# Patient Record
Sex: FEMALE | Race: WHITE | Employment: OTHER | URBAN - NONMETROPOLITAN AREA
[De-identification: names, ages, dates, MRNs, and addresses within clinical notes are randomized per-mention and may not be internally consistent; named-entity substitution may affect disease eponyms.]

---

## 2017-01-04 ENCOUNTER — OFFICE VISIT (OUTPATIENT)
Dept: INTERNAL MEDICINE | Age: 82
End: 2017-01-04

## 2017-01-04 VITALS
HEIGHT: 61 IN | BODY MASS INDEX: 17.64 KG/M2 | DIASTOLIC BLOOD PRESSURE: 68 MMHG | HEART RATE: 76 BPM | RESPIRATION RATE: 16 BRPM | WEIGHT: 93.4 LBS | SYSTOLIC BLOOD PRESSURE: 120 MMHG

## 2017-01-04 DIAGNOSIS — E78.2 MIXED HYPERLIPIDEMIA: ICD-10-CM

## 2017-01-04 DIAGNOSIS — F41.9 ANXIETY: ICD-10-CM

## 2017-01-04 DIAGNOSIS — E03.8 OTHER SPECIFIED HYPOTHYROIDISM: ICD-10-CM

## 2017-01-04 DIAGNOSIS — M15.9 PRIMARY OSTEOARTHRITIS INVOLVING MULTIPLE JOINTS: ICD-10-CM

## 2017-01-04 DIAGNOSIS — K21.9 GASTROESOPHAGEAL REFLUX DISEASE WITHOUT ESOPHAGITIS: ICD-10-CM

## 2017-01-04 DIAGNOSIS — Z72.0 TOBACCO ABUSE: ICD-10-CM

## 2017-01-04 DIAGNOSIS — R73.01 ELEVATED FASTING GLUCOSE: Primary | ICD-10-CM

## 2017-01-04 DIAGNOSIS — I25.10 CORONARY ARTERY DISEASE INVOLVING NATIVE CORONARY ARTERY OF NATIVE HEART, ANGINA PRESENCE UNSPECIFIED: ICD-10-CM

## 2017-01-04 DIAGNOSIS — J43.8 OTHER EMPHYSEMA (HCC): ICD-10-CM

## 2017-01-04 PROCEDURE — 99213 OFFICE O/P EST LOW 20 MIN: CPT | Performed by: INTERNAL MEDICINE

## 2017-01-04 RX ORDER — LORATADINE 10 MG/1
10 TABLET ORAL DAILY
COMMUNITY
End: 2018-07-26 | Stop reason: ALTCHOICE

## 2017-01-13 RX ORDER — LORAZEPAM 1 MG/1
1 TABLET ORAL EVERY 6 HOURS PRN
Qty: 120 TABLET | Refills: 0 | OUTPATIENT
Start: 2017-01-13 | End: 2017-02-13 | Stop reason: SDUPTHER

## 2017-02-13 RX ORDER — LORAZEPAM 1 MG/1
1 TABLET ORAL EVERY 6 HOURS PRN
Qty: 120 TABLET | Refills: 0 | Status: SHIPPED | OUTPATIENT
Start: 2017-02-13 | End: 2017-03-16 | Stop reason: SDUPTHER

## 2017-03-16 RX ORDER — LORAZEPAM 1 MG/1
1 TABLET ORAL EVERY 6 HOURS PRN
Qty: 120 TABLET | Refills: 0 | OUTPATIENT
Start: 2017-03-16 | End: 2017-05-12 | Stop reason: SDUPTHER

## 2017-04-07 ENCOUNTER — OFFICE VISIT (OUTPATIENT)
Dept: OPHTHALMOLOGY | Age: 82
End: 2017-04-07
Payer: MEDICARE

## 2017-04-07 ENCOUNTER — OFFICE VISIT (OUTPATIENT)
Dept: INTERNAL MEDICINE | Age: 82
End: 2017-04-07
Payer: MEDICARE

## 2017-04-07 VITALS
DIASTOLIC BLOOD PRESSURE: 68 MMHG | WEIGHT: 93.4 LBS | BODY MASS INDEX: 17.64 KG/M2 | SYSTOLIC BLOOD PRESSURE: 120 MMHG | HEART RATE: 78 BPM | HEIGHT: 61 IN | RESPIRATION RATE: 16 BRPM

## 2017-04-07 DIAGNOSIS — H02.839 DERMATOCHALASIS, UNSPECIFIED LATERALITY: ICD-10-CM

## 2017-04-07 DIAGNOSIS — J43.8 OTHER EMPHYSEMA (HCC): ICD-10-CM

## 2017-04-07 DIAGNOSIS — R73.01 ELEVATED FASTING GLUCOSE: ICD-10-CM

## 2017-04-07 DIAGNOSIS — M15.9 PRIMARY OSTEOARTHRITIS INVOLVING MULTIPLE JOINTS: ICD-10-CM

## 2017-04-07 DIAGNOSIS — Z96.1 PSEUDOPHAKIA OF BOTH EYES: ICD-10-CM

## 2017-04-07 DIAGNOSIS — E03.8 OTHER SPECIFIED HYPOTHYROIDISM: ICD-10-CM

## 2017-04-07 DIAGNOSIS — E78.2 MIXED HYPERLIPIDEMIA: ICD-10-CM

## 2017-04-07 DIAGNOSIS — Z00.00 ROUTINE GENERAL MEDICAL EXAMINATION AT A HEALTH CARE FACILITY: Primary | ICD-10-CM

## 2017-04-07 DIAGNOSIS — H35.3190 NONEXUDATIVE SENILE MACULAR DEGENERATION OF RETINA: Primary | ICD-10-CM

## 2017-04-07 DIAGNOSIS — H04.129 TEAR FILM INSUFFICIENCY, UNSPECIFIED: ICD-10-CM

## 2017-04-07 DIAGNOSIS — H35.3190 DRY SENILE MACULAR DEGENERATION: ICD-10-CM

## 2017-04-07 DIAGNOSIS — K21.9 GASTROESOPHAGEAL REFLUX DISEASE WITHOUT ESOPHAGITIS: ICD-10-CM

## 2017-04-07 DIAGNOSIS — I25.10 CORONARY ARTERY DISEASE INVOLVING NATIVE CORONARY ARTERY OF NATIVE HEART, ANGINA PRESENCE UNSPECIFIED: ICD-10-CM

## 2017-04-07 DIAGNOSIS — H40.003 GLAUCOMA SUSPECT, BILATERAL: ICD-10-CM

## 2017-04-07 DIAGNOSIS — Z72.0 TOBACCO ABUSE: ICD-10-CM

## 2017-04-07 PROCEDURE — 1090F PRES/ABSN URINE INCON ASSESS: CPT | Performed by: OPHTHALMOLOGY

## 2017-04-07 PROCEDURE — G8419 CALC BMI OUT NRM PARAM NOF/U: HCPCS | Performed by: OPHTHALMOLOGY

## 2017-04-07 PROCEDURE — 1090F PRES/ABSN URINE INCON ASSESS: CPT | Performed by: INTERNAL MEDICINE

## 2017-04-07 PROCEDURE — G8427 DOCREV CUR MEDS BY ELIG CLIN: HCPCS | Performed by: OPHTHALMOLOGY

## 2017-04-07 PROCEDURE — 99214 OFFICE O/P EST MOD 30 MIN: CPT | Performed by: OPHTHALMOLOGY

## 2017-04-07 PROCEDURE — G8599 NO ASA/ANTIPLAT THER USE RNG: HCPCS | Performed by: INTERNAL MEDICINE

## 2017-04-07 PROCEDURE — 4004F PT TOBACCO SCREEN RCVD TLK: CPT | Performed by: INTERNAL MEDICINE

## 2017-04-07 PROCEDURE — 4040F PNEUMOC VAC/ADMIN/RCVD: CPT | Performed by: INTERNAL MEDICINE

## 2017-04-07 PROCEDURE — 4040F PNEUMOC VAC/ADMIN/RCVD: CPT | Performed by: OPHTHALMOLOGY

## 2017-04-07 PROCEDURE — 99213 OFFICE O/P EST LOW 20 MIN: CPT | Performed by: INTERNAL MEDICINE

## 2017-04-07 PROCEDURE — G8419 CALC BMI OUT NRM PARAM NOF/U: HCPCS | Performed by: INTERNAL MEDICINE

## 2017-04-07 PROCEDURE — G8926 SPIRO NO PERF OR DOC: HCPCS | Performed by: INTERNAL MEDICINE

## 2017-04-07 PROCEDURE — 4004F PT TOBACCO SCREEN RCVD TLK: CPT | Performed by: OPHTHALMOLOGY

## 2017-04-07 PROCEDURE — G8427 DOCREV CUR MEDS BY ELIG CLIN: HCPCS | Performed by: INTERNAL MEDICINE

## 2017-04-07 PROCEDURE — 3023F SPIROM DOC REV: CPT | Performed by: INTERNAL MEDICINE

## 2017-04-07 PROCEDURE — G8599 NO ASA/ANTIPLAT THER USE RNG: HCPCS | Performed by: OPHTHALMOLOGY

## 2017-04-07 PROCEDURE — G0438 PPPS, INITIAL VISIT: HCPCS | Performed by: INTERNAL MEDICINE

## 2017-04-07 PROCEDURE — 1123F ACP DISCUSS/DSCN MKR DOCD: CPT | Performed by: OPHTHALMOLOGY

## 2017-04-07 PROCEDURE — 1123F ACP DISCUSS/DSCN MKR DOCD: CPT | Performed by: INTERNAL MEDICINE

## 2017-04-07 RX ORDER — TROPICAMIDE 10 MG/ML
1 SOLUTION/ DROPS OPHTHALMIC ONCE
Status: COMPLETED | OUTPATIENT
Start: 2017-04-07 | End: 2017-04-07

## 2017-04-07 RX ORDER — PHENYLEPHRINE HCL 2.5 %
1 DROPS OPHTHALMIC (EYE) ONCE
Status: COMPLETED | OUTPATIENT
Start: 2017-04-07 | End: 2017-04-07

## 2017-04-07 RX ORDER — BENOXINATE HCL/FLUORESCEIN SOD 0.4%-0.25%
1 DROPS OPHTHALMIC (EYE) ONCE
Status: COMPLETED | OUTPATIENT
Start: 2017-04-07 | End: 2017-04-07

## 2017-04-07 RX ADMIN — Medication 1 DROP: at 15:14

## 2017-04-07 RX ADMIN — TROPICAMIDE 1 DROP: 10 SOLUTION/ DROPS OPHTHALMIC at 15:14

## 2017-04-07 ASSESSMENT — LIFESTYLE VARIABLES: HOW OFTEN DO YOU HAVE A DRINK CONTAINING ALCOHOL: 0

## 2017-04-07 ASSESSMENT — VISUAL ACUITY
OS_CC+: -1
OS_CC: 20/25
CORRECTION_TYPE: GLASSES
METHOD: SNELLEN - LINEAR

## 2017-04-07 ASSESSMENT — TONOMETRY
IOP_METHOD: APPLANATION W FLURESS DROP
OD_IOP_MMHG: 24
OS_IOP_MMHG: 24

## 2017-04-07 ASSESSMENT — CONF VISUAL FIELD
OD_NORMAL: 1
METHOD: COUNTING FINGERS
OS_NORMAL: 1

## 2017-04-07 ASSESSMENT — ANXIETY QUESTIONNAIRES: GAD7 TOTAL SCORE: 1

## 2017-04-07 ASSESSMENT — ENCOUNTER SYMPTOMS
GASTROINTESTINAL NEGATIVE: 1
RESPIRATORY NEGATIVE: 1

## 2017-04-07 ASSESSMENT — SLIT LAMP EXAM - LIDS
COMMENTS: 2+ DERMATOCHALASIS - UPPER LID
COMMENTS: 2+ DERMATOCHALASIS - UPPER LID

## 2017-04-07 ASSESSMENT — PATIENT HEALTH QUESTIONNAIRE - PHQ9: SUM OF ALL RESPONSES TO PHQ QUESTIONS 1-9: 0

## 2017-05-12 RX ORDER — LORAZEPAM 1 MG/1
1 TABLET ORAL EVERY 6 HOURS PRN
Qty: 120 TABLET | Refills: 0 | Status: SHIPPED | OUTPATIENT
Start: 2017-05-12 | End: 2017-06-23 | Stop reason: SDUPTHER

## 2017-06-23 RX ORDER — LORAZEPAM 1 MG/1
1 TABLET ORAL EVERY 6 HOURS PRN
Qty: 120 TABLET | Refills: 0 | OUTPATIENT
Start: 2017-06-23 | End: 2017-08-09 | Stop reason: SDUPTHER

## 2017-08-09 ENCOUNTER — OFFICE VISIT (OUTPATIENT)
Dept: OPHTHALMOLOGY | Age: 82
End: 2017-08-09
Payer: MEDICARE

## 2017-08-09 DIAGNOSIS — H35.3190 NONEXUDATIVE SENILE MACULAR DEGENERATION OF RETINA: ICD-10-CM

## 2017-08-09 DIAGNOSIS — H35.352 CYSTOID MACULAR DEGENERATION OF RETINA, LEFT: Primary | ICD-10-CM

## 2017-08-09 DIAGNOSIS — H40.003 GLAUCOMA SUSPECT, BILATERAL: ICD-10-CM

## 2017-08-09 DIAGNOSIS — H04.129 TEAR FILM INSUFFICIENCY, UNSPECIFIED: ICD-10-CM

## 2017-08-09 PROCEDURE — G8419 CALC BMI OUT NRM PARAM NOF/U: HCPCS | Performed by: OPHTHALMOLOGY

## 2017-08-09 PROCEDURE — G8427 DOCREV CUR MEDS BY ELIG CLIN: HCPCS | Performed by: OPHTHALMOLOGY

## 2017-08-09 PROCEDURE — G8599 NO ASA/ANTIPLAT THER USE RNG: HCPCS | Performed by: OPHTHALMOLOGY

## 2017-08-09 PROCEDURE — 1090F PRES/ABSN URINE INCON ASSESS: CPT | Performed by: OPHTHALMOLOGY

## 2017-08-09 PROCEDURE — 4004F PT TOBACCO SCREEN RCVD TLK: CPT | Performed by: OPHTHALMOLOGY

## 2017-08-09 PROCEDURE — 1123F ACP DISCUSS/DSCN MKR DOCD: CPT | Performed by: OPHTHALMOLOGY

## 2017-08-09 PROCEDURE — 92134 CPTRZ OPH DX IMG PST SGM RTA: CPT | Performed by: OPHTHALMOLOGY

## 2017-08-09 PROCEDURE — 99214 OFFICE O/P EST MOD 30 MIN: CPT | Performed by: OPHTHALMOLOGY

## 2017-08-09 PROCEDURE — 4040F PNEUMOC VAC/ADMIN/RCVD: CPT | Performed by: OPHTHALMOLOGY

## 2017-08-09 RX ORDER — BENOXINATE HCL/FLUORESCEIN SOD 0.4%-0.25%
1 DROPS OPHTHALMIC (EYE) ONCE
Status: COMPLETED | OUTPATIENT
Start: 2017-08-09 | End: 2017-08-09

## 2017-08-09 RX ORDER — LORAZEPAM 1 MG/1
1 TABLET ORAL EVERY 6 HOURS PRN
Qty: 120 TABLET | Refills: 0 | OUTPATIENT
Start: 2017-08-09 | End: 2017-09-20 | Stop reason: SDUPTHER

## 2017-08-09 RX ADMIN — Medication 1 DROP: at 16:02

## 2017-08-09 ASSESSMENT — ENCOUNTER SYMPTOMS
RESPIRATORY NEGATIVE: 1
GASTROINTESTINAL NEGATIVE: 1

## 2017-08-09 ASSESSMENT — TONOMETRY
IOP_METHOD: APPLANATION W FLURESS DROP
OD_IOP_MMHG: 24
OS_IOP_MMHG: 24

## 2017-08-09 ASSESSMENT — SLIT LAMP EXAM - LIDS
COMMENTS: 2+ DERMATOCHALASIS - UPPER LID
COMMENTS: 2+ DERMATOCHALASIS - UPPER LID

## 2017-09-11 ENCOUNTER — OFFICE VISIT (OUTPATIENT)
Dept: OPTOMETRY | Age: 82
End: 2017-09-11
Payer: MEDICARE

## 2017-09-11 DIAGNOSIS — H53.8 BLURRED VISION, BILATERAL: Primary | ICD-10-CM

## 2017-09-11 DIAGNOSIS — H52.03 HYPEROPIA OF BOTH EYES WITH ASTIGMATISM AND PRESBYOPIA: ICD-10-CM

## 2017-09-11 DIAGNOSIS — H52.4 HYPEROPIA OF BOTH EYES WITH ASTIGMATISM AND PRESBYOPIA: ICD-10-CM

## 2017-09-11 DIAGNOSIS — H35.30 MACULAR DEGENERATION (SENILE) OF RETINA, UNSPECIFIED: ICD-10-CM

## 2017-09-11 DIAGNOSIS — H52.203 HYPEROPIA OF BOTH EYES WITH ASTIGMATISM AND PRESBYOPIA: ICD-10-CM

## 2017-09-11 DIAGNOSIS — Z96.1 PSEUDOPHAKIA OF BOTH EYES: ICD-10-CM

## 2017-09-11 DIAGNOSIS — H50.00 ESOTROPIA: ICD-10-CM

## 2017-09-11 PROCEDURE — 99213 OFFICE O/P EST LOW 20 MIN: CPT | Performed by: OPTOMETRIST

## 2017-09-11 PROCEDURE — G8427 DOCREV CUR MEDS BY ELIG CLIN: HCPCS | Performed by: OPTOMETRIST

## 2017-09-11 PROCEDURE — G8599 NO ASA/ANTIPLAT THER USE RNG: HCPCS | Performed by: OPTOMETRIST

## 2017-09-11 PROCEDURE — 4040F PNEUMOC VAC/ADMIN/RCVD: CPT | Performed by: OPTOMETRIST

## 2017-09-11 PROCEDURE — 4004F PT TOBACCO SCREEN RCVD TLK: CPT | Performed by: OPTOMETRIST

## 2017-09-11 PROCEDURE — G8418 CALC BMI BLW LOW PARAM F/U: HCPCS | Performed by: OPTOMETRIST

## 2017-09-11 PROCEDURE — 1123F ACP DISCUSS/DSCN MKR DOCD: CPT | Performed by: OPTOMETRIST

## 2017-09-11 PROCEDURE — 1090F PRES/ABSN URINE INCON ASSESS: CPT | Performed by: OPTOMETRIST

## 2017-09-11 ASSESSMENT — REFRACTION_FINALRX
OD_HPRISM: 5.5 BO
OS_HPRISM: 5.5 BO

## 2017-09-11 ASSESSMENT — REFRACTION_WEARINGRX
OD_SPHERE: +1.25
OS_AXIS: 108
OS_CYLINDER: -1.75
OD_CYLINDER: -1.25
SPECS_TYPE: BIFOCAL
OS_ADD: +2.50
OD_AXIS: 090
OS_SPHERE: +1.50
OD_ADD: +2.50

## 2017-09-11 ASSESSMENT — KERATOMETRY
OS_AXISANGLE_DEGREES: 016
OS_AXISANGLE2_DEGREES: 106
OS_K2POWER_DIOPTERS: 463.25
OD_AXISANGLE2_DEGREES: 096
OD_AXISANGLE_DEGREES: 006
OD_K2POWER_DIOPTERS: 45.75
OD_K1POWER_DIOPTERS: 45.00
OS_K1POWER_DIOPTERS: 45.25

## 2017-09-11 ASSESSMENT — REFRACTION_MANIFEST
OS_ADD: +2.50
OS_AXIS: 106
OS_SPHERE: +1.25
OD_HPRISM: 5.5 BO
OS_CYLINDER: -1.75
OD_SPHERE: +1.00
OD_AXIS: 080
OS_HPRISM: 5.5 BO
OD_CYLINDER: -1.50
OD_ADD: +2.50

## 2017-09-11 ASSESSMENT — VISUAL ACUITY
METHOD: SNELLEN - LINEAR
OD_CC: 20/30 OU
OS_CC: 20/60
CORRECTION_TYPE: GLASSES
OS_CC+: -1

## 2017-09-11 ASSESSMENT — SLIT LAMP EXAM - LIDS
COMMENTS: 2+ DERMATOCHALASIS - UPPER LID
COMMENTS: 2+ DERMATOCHALASIS - UPPER LID

## 2017-09-20 DIAGNOSIS — F41.9 ANXIETY: Primary | ICD-10-CM

## 2017-09-20 RX ORDER — LORAZEPAM 1 MG/1
1 TABLET ORAL EVERY 6 HOURS PRN
Qty: 120 TABLET | Refills: 0 | OUTPATIENT
Start: 2017-09-20 | End: 2017-11-08 | Stop reason: SDUPTHER

## 2017-11-08 DIAGNOSIS — F41.9 ANXIETY: ICD-10-CM

## 2017-11-08 RX ORDER — LORAZEPAM 1 MG/1
1 TABLET ORAL EVERY 6 HOURS PRN
Qty: 120 TABLET | Refills: 0 | OUTPATIENT
Start: 2017-11-08 | End: 2017-12-20 | Stop reason: SDUPTHER

## 2017-11-08 NOTE — TELEPHONE ENCOUNTER
Controlled Substances Monitoring:     Attestation: The Prescription Monitoring Report for this patient was reviewed today. Jarrett Song, DO)  Documentation: No signs of potential drug abuse or diversion identified.  (Maria Ines Patiño, DO)

## 2017-11-13 ENCOUNTER — HOSPITAL ENCOUNTER (OUTPATIENT)
Dept: LAB | Age: 82
Setting detail: SPECIMEN
Discharge: HOME | End: 2017-11-13
Payer: MEDICARE

## 2017-11-13 ENCOUNTER — OFFICE VISIT (OUTPATIENT)
Dept: INTERNAL MEDICINE | Age: 82
End: 2017-11-13
Payer: MEDICARE

## 2017-11-13 VITALS
HEIGHT: 62 IN | SYSTOLIC BLOOD PRESSURE: 128 MMHG | HEART RATE: 78 BPM | DIASTOLIC BLOOD PRESSURE: 72 MMHG | WEIGHT: 99.6 LBS | BODY MASS INDEX: 18.33 KG/M2 | RESPIRATION RATE: 16 BRPM

## 2017-11-13 DIAGNOSIS — R05.9 COUGH: ICD-10-CM

## 2017-11-13 DIAGNOSIS — E03.8 OTHER SPECIFIED HYPOTHYROIDISM: ICD-10-CM

## 2017-11-13 DIAGNOSIS — K21.9 GASTROESOPHAGEAL REFLUX DISEASE WITHOUT ESOPHAGITIS: ICD-10-CM

## 2017-11-13 DIAGNOSIS — F41.9 ANXIETY: ICD-10-CM

## 2017-11-13 DIAGNOSIS — M15.9 PRIMARY OSTEOARTHRITIS INVOLVING MULTIPLE JOINTS: ICD-10-CM

## 2017-11-13 DIAGNOSIS — R73.01 ELEVATED FASTING GLUCOSE: ICD-10-CM

## 2017-11-13 DIAGNOSIS — Z72.0 TOBACCO ABUSE: ICD-10-CM

## 2017-11-13 DIAGNOSIS — D50.8 OTHER IRON DEFICIENCY ANEMIA: ICD-10-CM

## 2017-11-13 DIAGNOSIS — E78.2 MIXED HYPERLIPIDEMIA: ICD-10-CM

## 2017-11-13 DIAGNOSIS — R73.01 ELEVATED FASTING GLUCOSE: Primary | ICD-10-CM

## 2017-11-13 DIAGNOSIS — J06.9 UPPER RESPIRATORY TRACT INFECTION, UNSPECIFIED TYPE: ICD-10-CM

## 2017-11-13 DIAGNOSIS — J43.8 OTHER EMPHYSEMA (HCC): ICD-10-CM

## 2017-11-13 LAB
ESTIMATED AVERAGE GLUCOSE: 114 MG/DL
GLUCOSE FASTING: 111 MG/DL (ref 70–99)
HBA1C MFR BLD: 5.6 % (ref 4.8–5.9)

## 2017-11-13 PROCEDURE — G8419 CALC BMI OUT NRM PARAM NOF/U: HCPCS | Performed by: INTERNAL MEDICINE

## 2017-11-13 PROCEDURE — 82947 ASSAY GLUCOSE BLOOD QUANT: CPT

## 2017-11-13 PROCEDURE — 1090F PRES/ABSN URINE INCON ASSESS: CPT | Performed by: INTERNAL MEDICINE

## 2017-11-13 PROCEDURE — 4004F PT TOBACCO SCREEN RCVD TLK: CPT | Performed by: INTERNAL MEDICINE

## 2017-11-13 PROCEDURE — 99214 OFFICE O/P EST MOD 30 MIN: CPT | Performed by: INTERNAL MEDICINE

## 2017-11-13 PROCEDURE — 83036 HEMOGLOBIN GLYCOSYLATED A1C: CPT

## 2017-11-13 PROCEDURE — 3023F SPIROM DOC REV: CPT | Performed by: INTERNAL MEDICINE

## 2017-11-13 PROCEDURE — G8926 SPIRO NO PERF OR DOC: HCPCS | Performed by: INTERNAL MEDICINE

## 2017-11-13 PROCEDURE — G8599 NO ASA/ANTIPLAT THER USE RNG: HCPCS | Performed by: INTERNAL MEDICINE

## 2017-11-13 PROCEDURE — 1123F ACP DISCUSS/DSCN MKR DOCD: CPT | Performed by: INTERNAL MEDICINE

## 2017-11-13 PROCEDURE — 4040F PNEUMOC VAC/ADMIN/RCVD: CPT | Performed by: INTERNAL MEDICINE

## 2017-11-13 PROCEDURE — G8427 DOCREV CUR MEDS BY ELIG CLIN: HCPCS | Performed by: INTERNAL MEDICINE

## 2017-11-13 PROCEDURE — 36415 COLL VENOUS BLD VENIPUNCTURE: CPT

## 2017-11-13 PROCEDURE — G8484 FLU IMMUNIZE NO ADMIN: HCPCS | Performed by: INTERNAL MEDICINE

## 2017-11-13 NOTE — PROGRESS NOTES
Valium     Vicodin [Hydrocodone-Acetaminophen]     Zithromax [Azithromycin]      Nausea      Hydrocodone Nausea Only       MEDICATIONS:   Outpatient Prescriptions Marked as Taking for the 11/13/17 encounter (Office Visit) with Prince GarciaDO   Medication Sig Dispense Refill    LORazepam (ATIVAN) 1 MG tablet Take 1 tablet by mouth every 6 hours as needed for Anxiety . 120 tablet 0    loratadine (CLARITIN) 10 MG tablet Take 10 mg by mouth daily      Multiple Minerals-Vitamins (CALCIUM-MAGNESIUM-ZINC-D3) TABS Take 1 tablet by mouth daily      diphenhydrAMINE (BENADRYL) 25 MG capsule Take 25 mg by mouth nightly      Fiber POWD Take by mouth nightly      Multiple Vitamins-Minerals (I-DANIA) TABS Take 1 tablet by mouth 2 times daily      Cyanocobalamin (B-12) 1000 MCG CAPS Take 1 tablet by mouth daily      miconazole (NEOSPORIN AF) 2 % cream Apply topically 2 times daily Apply topically 2 times daily.  tiZANidine (ZANAFLEX) 4 MG tablet Take 1 tablet by mouth 3 times daily 90 tablet 2    Propylene Glycol (SYSTANE BALANCE OP) Apply 1 drop to eye 3 times daily       albuterol (PROVENTIL HFA) 108 (90 BASE) MCG/ACT inhaler Inhale 2 puffs into the lungs every 6 hours as needed for Wheezing or Shortness of Breath. 1 Inhaler 3    acetaminophen (TYLENOL) 500 MG tablet Take 500 mg by mouth every 6 hours as needed for Pain.  Probiotic Product (PROBIOTIC DAILY PO) Take 1 tablet by mouth daily TruBiotics      Multiple Vitamins-Minerals (MULTIVITAMIN & MINERAL PO) Take 1 tablet by mouth daily.  Cholecalciferol (VITAMIN D3) 1000 UNITS TABS Take 1 tablet by mouth daily.  senna (SENOKOT) 8.6 MG tablet Take 2 tablets by mouth daily as needed       nitroGLYCERIN (NITROSTAT) 0.4 MG SL tablet Place 0.4 mg under the tongue every 5 minutes as needed.       docusate sodium (COLACE) 100 MG capsule Take 100 mg by mouth 2 times daily       ascorbic acid (VITAMIN C) 500 MG tablet Take 1,000 mg by mouth daily.      Biotin 5000 MCG CAPS Take 2 tablets by mouth daily          IMMUNIZATIONS: Reviewed for influenza and pneumococcal status as indicated in electronic record. REVIEW OF SYSTEMS:     General: negative for - chills, fever or night sweats  Skin: negative for - pruritus or rash  Head: Negative for: headache or recent history of head trauma  Ear, Nose, Throat: positive for - nasal congestion and nasal discharge  negative for - epistaxis, sore throat, tinnitus or vertigo  Cardiovascular: negative for - chest pain, dyspnea on exertion or shortness of breath  Respiratory: positive for - cough  negative for - hemoptysis or wheezing  Gastrointestinal: negative for - constipation, diarrhea or nausea/vomiting  Genitourinary: negative for - dysuria, hematuria or nocturia  Musculoskeletal: positive for - joint pain  negative for - muscle pain or muscular weakness  Neurologic: negative for - gait disturbance, numbness/tingling, seizures or tremors  Psychiatric: positive for - anxiety  negative for - depression                PHYSICAL EXAMINATION:    Wt Readings from Last 2 Encounters:   11/13/17 99 lb 9.6 oz (45.2 kg)   04/07/17 93 lb 6.4 oz (42.4 kg)       Vitals: /72 (Site: Left Arm, Position: Sitting, Cuff Size: Small Adult)   Pulse 78   Resp 16   Ht 5' 2.21\" (1.58 m)   Wt 99 lb 9.6 oz (45.2 kg)   BMI 18.10 kg/m²   General: This is a 80 y.o.  female who is alert and oriented to person, place and time. She appears to be her stated age and does not appear to be in any acute distress. Skin: Skin color, texture, turgor normal. No rashes or lesions. HEENT/Neck: Head: Normal, normocephalic, atraumatic. Eye: Normal external eye, conjunctiva, lids cornea, BLAIR. Ears: Normal TM's bilaterally. Normal auditory canals and external ears. Non-tender. Nose: Normal external nose and septum. Mucosa erythematous with clear discharge. Pharynx: Dental Hygiene adequate. Normal buccal mucosa. Normal pharynx. osteoarthritis involving multiple joints, stable  - We will continue to monitor     9. Gastroesophageal reflux disease without esophagitis, stable  - We will continue to monitor     10. Anxiety, stable  - She will continue to take Xanax as needed    11. Tobacco abuse  - She was advised to quit smoking immediately and completely. The risks of continued smoking were reviewed with her and she did verbalize understanding.  - We gave her some reading material on this topic        Orders Placed This Encounter   Procedures    Glucose, Fasting     Standing Status:   Future     Number of Occurrences:   1     Standing Expiration Date:   11/13/2018    Hemoglobin A1C     Standing Status:   Future     Number of Occurrences:   1     Standing Expiration Date:   11/13/2018    Hemoglobin A1C     Standing Status:   Future     Standing Expiration Date:   11/13/2018    CBC Auto Differential     Standing Status:   Future     Standing Expiration Date:   11/13/2018    Comprehensive Metabolic Panel     Standing Status:   Future     Standing Expiration Date:   11/13/2018    Lipid Panel     Standing Status:   Future     Standing Expiration Date:   11/13/2018     Order Specific Question:   Is Patient Fasting?/# of Hours     Answer:   15    Urinalysis with Microscopic     Standing Status:   Future     Standing Expiration Date:   11/13/2018     Order Specific Question:   SPECIFY(EX-CATH,MIDSTREAM,CYSTO,ETC)? Answer:   midstream    TSH without Reflex     Standing Status:   Future     Standing Expiration Date:   11/13/2018    T4, Free     Standing Status:   Future     Standing Expiration Date:   11/13/2018    Ferritin     Standing Status:   Future     Standing Expiration Date:   11/13/2018    Iron and TIBC     Standing Status:   Future     Standing Expiration Date:   11/13/2018     Order Specific Question:   Is Patient Fasting? Answer:   no     Order Specific Question:   No of Hours?      Answer:   na       Requested

## 2017-11-13 NOTE — PATIENT INSTRUCTIONS
Patient Education        Upper Respiratory Infection (Cold): Care Instructions  Your Care Instructions    An upper respiratory infection, or URI, is an infection of the nose, sinuses, or throat. URIs are spread by coughs, sneezes, and direct contact. The common cold is the most frequent kind of URI. The flu and sinus infections are other kinds of URIs. Almost all URIs are caused by viruses. Antibiotics won't cure them. But you can treat most infections with home care. This may include drinking lots of fluids and taking over-the-counter pain medicine. You will probably feel better in 4 to 10 days. The doctor has checked you carefully, but problems can develop later. If you notice any problems or new symptoms, get medical treatment right away. Follow-up care is a key part of your treatment and safety. Be sure to make and go to all appointments, and call your doctor if you are having problems. It's also a good idea to know your test results and keep a list of the medicines you take. How can you care for yourself at home? · To prevent dehydration, drink plenty of fluids, enough so that your urine is light yellow or clear like water. Choose water and other caffeine-free clear liquids until you feel better. If you have kidney, heart, or liver disease and have to limit fluids, talk with your doctor before you increase the amount of fluids you drink. · Take an over-the-counter pain medicine, such as acetaminophen (Tylenol), ibuprofen (Advil, Motrin), or naproxen (Aleve). Read and follow all instructions on the label. · Before you use cough and cold medicines, check the label. These medicines may not be safe for young children or for people with certain health problems. · Be careful when taking over-the-counter cold or flu medicines and Tylenol at the same time. Many of these medicines have acetaminophen, which is Tylenol. Read the labels to make sure that you are not taking more than the recommended dose.  Too much acetaminophen (Tylenol) can be harmful. · Get plenty of rest.  · Do not smoke or allow others to smoke around you. If you need help quitting, talk to your doctor about stop-smoking programs and medicines. These can increase your chances of quitting for good. When should you call for help? Call 911 anytime you think you may need emergency care. For example, call if:  · You have severe trouble breathing. Call your doctor now or seek immediate medical care if:  · You seem to be getting much sicker. · You have new or worse trouble breathing. · You have a new or higher fever. · You have a new rash. Watch closely for changes in your health, and be sure to contact your doctor if:  · You have a new symptom, such as a sore throat, an earache, or sinus pain. · You cough more deeply or more often, especially if you notice more mucus or a change in the color of your mucus. · You do not get better as expected. Where can you learn more? Go to https://[employer]. org and sign in to your [employer] account. Enter E256 in the Aircrm box to learn more about \"Upper Respiratory Infection (Cold): Care Instructions. \"     If you do not have an account, please click on the \"Sign Up Now\" link. Current as of: March 25, 2017  Content Version: 11.3  © 2778-0857 [employer]. Care instructions adapted under license by [facility] ([facility]). If you have questions about a medical condition or this instruction, always ask your healthcare professional. [place] [address] any warranty or liability for your use of this information. Patient Education        Cough: Care Instructions  Your Care Instructions  A cough is your body's response to something that bothers your throat or airways. Many things can cause a cough. You might cough because of a cold or the flu, bronchitis, or asthma.  Smoking, postnasal drip, allergies, and stomach acid that backs up into your throat also can cause If you do not have an account, please click on the \"Sign Up Now\" link. Current as of: March 25, 2017  Content Version: 11.3  © 6466-0251 [employer]. Care instructions adapted under license by [facility] ([facility]). If you have questions about a medical condition or this instruction, always ask your healthcare professional. [address] any warranty or liability for your use of this information. Patient Education        Sore Throat: Care Instructions  Your Care Instructions    Infection by bacteria or a virus causes most sore throats. Cigarette smoke, dry air, air pollution, allergies, and yelling can also cause a sore throat. Sore throats can be painful and annoying. Fortunately, most sore throats go away on their own. If you have a bacterial infection, your doctor may prescribe antibiotics. Follow-up care is a key part of your treatment and safety. Be sure to make and go to all appointments, and call your doctor if you are having problems. It's also a good idea to know your test results and keep a list of the medicines you take. How can you care for yourself at home? · If your doctor prescribed antibiotics, take them as directed. Do not stop taking them just because you feel better. You need to take the full course of antibiotics. · Gargle with warm salt water once an hour to help reduce swelling and relieve discomfort. Use 1 teaspoon of salt mixed in 1 cup of warm water. · Take an over-the-counter pain medicine, such as acetaminophen (Tylenol), ibuprofen (Advil, Motrin), or naproxen (Aleve). Read and follow all instructions on the label. · Be careful when taking over-the-counter cold or flu medicines and Tylenol at the same time. Many of these medicines have acetaminophen, which is Tylenol. Read the labels to make sure that you are not taking more than the recommended dose. Too much acetaminophen (Tylenol) can be harmful. · Drink plenty of fluids.  Fluids may help soothe need. These products come in several forms, many of them available over-the-counter:  ¨ Nicotine patches  ¨ Nicotine gum and lozenges  ¨ Nicotine inhaler  · Ask your doctor about bupropion (Wellbutrin) or varenicline (Chantix), which are prescription medicines. They do not contain nicotine. They help you by reducing withdrawal symptoms, such as stress and anxiety. · Some people find hypnosis, acupuncture, and massage helpful for ending the smoking habit. · Eat a healthy diet and get regular exercise. Having healthy habits will help your body move past its craving for nicotine. · Be prepared to keep trying. Most people are not successful the first few times they try to quit. Do not get mad at yourself if you smoke again. Make a list of things you learned and think about when you want to try again, such as next week, next month, or next year. Where can you learn more? Go to https://BrayolapeTrafficCast.[employer]. org and sign in to your W[employer] account. Enter L806 in the AUPEO! box to learn more about \"Stopping Smoking: Care Instructions. \"     If you do not have an account, please click on the \"Sign Up Now\" link. Current as of: March 20, 2017  Content Version: 11.3  © 3213-7679 [employer]. Care instructions adapted under license by [facility] ([facility]). If you have questions about a medical condition or this instruction, always ask your healthcare professional. [place] [address] any warranty or liability for your use of this information.

## 2017-12-06 ENCOUNTER — OFFICE VISIT (OUTPATIENT)
Dept: OPHTHALMOLOGY | Age: 82
End: 2017-12-06
Payer: MEDICARE

## 2017-12-06 DIAGNOSIS — H02.839 DERMATOCHALASIS, UNSPECIFIED LATERALITY: ICD-10-CM

## 2017-12-06 DIAGNOSIS — Z96.1 PSEUDOPHAKIA OF BOTH EYES: ICD-10-CM

## 2017-12-06 DIAGNOSIS — H40.003 GLAUCOMA SUSPECT, BILATERAL: Primary | ICD-10-CM

## 2017-12-06 PROCEDURE — 1090F PRES/ABSN URINE INCON ASSESS: CPT | Performed by: OPHTHALMOLOGY

## 2017-12-06 PROCEDURE — 4004F PT TOBACCO SCREEN RCVD TLK: CPT | Performed by: OPHTHALMOLOGY

## 2017-12-06 PROCEDURE — G8484 FLU IMMUNIZE NO ADMIN: HCPCS | Performed by: OPHTHALMOLOGY

## 2017-12-06 PROCEDURE — 99213 OFFICE O/P EST LOW 20 MIN: CPT | Performed by: OPHTHALMOLOGY

## 2017-12-06 PROCEDURE — G8427 DOCREV CUR MEDS BY ELIG CLIN: HCPCS | Performed by: OPHTHALMOLOGY

## 2017-12-06 PROCEDURE — G8599 NO ASA/ANTIPLAT THER USE RNG: HCPCS | Performed by: OPHTHALMOLOGY

## 2017-12-06 PROCEDURE — 1123F ACP DISCUSS/DSCN MKR DOCD: CPT | Performed by: OPHTHALMOLOGY

## 2017-12-06 PROCEDURE — G8419 CALC BMI OUT NRM PARAM NOF/U: HCPCS | Performed by: OPHTHALMOLOGY

## 2017-12-06 PROCEDURE — 4040F PNEUMOC VAC/ADMIN/RCVD: CPT | Performed by: OPHTHALMOLOGY

## 2017-12-06 RX ORDER — BENOXINATE HCL/FLUORESCEIN SOD 0.4%-0.25%
1 DROPS OPHTHALMIC (EYE) ONCE
Status: COMPLETED | OUTPATIENT
Start: 2017-12-06 | End: 2017-12-06

## 2017-12-06 RX ADMIN — Medication 1 DROP: at 16:18

## 2017-12-06 ASSESSMENT — TONOMETRY
IOP_METHOD: APPLANATION W FLURESS DROP
OS_IOP_MMHG: 13
OD_IOP_MMHG: 12

## 2017-12-06 ASSESSMENT — VISUAL ACUITY
OS_CC+: -2
METHOD: SNELLEN - LINEAR
OS_CC: 20/50
CORRECTION_TYPE: GLASSES

## 2017-12-06 ASSESSMENT — SLIT LAMP EXAM - LIDS
COMMENTS: 2+ DERMATOCHALASIS - UPPER LID
COMMENTS: 2+ DERMATOCHALASIS - UPPER LID

## 2017-12-06 ASSESSMENT — ENCOUNTER SYMPTOMS
RESPIRATORY NEGATIVE: 1
GASTROINTESTINAL NEGATIVE: 1

## 2017-12-06 NOTE — PROGRESS NOTES
Anjali Price presents today for an IOP check   Chief Complaint   Patient presents with    Follow-up   . HPI     4 mo IOP ck   Systane Balance OU 3-4 times a day  Eye Health with Lutein 1xd  Seen Dr Moo Hicks for glasses, has not filled glasses RX  Having no problems with any other organ system except diarrhea            I have reviewed the HPI I agree and will use it for the pt. HPI. Review of Systems  Review of Systems   Constitutional: Negative. HENT: Negative. Respiratory: Negative. Cardiovascular: Negative. Gastrointestinal: Negative. Musculoskeletal: Negative. Skin: Negative. Neurological: Negative. Endo/Heme/Allergies: Negative. Main Ophthalmology Exam     External Exam       Right Left    External Normal Normal          Slit Lamp Exam       Right Left    Lids/Lashes 2+ Dermatochalasis - upper lid 2+ Dermatochalasis - upper lid    Conjunctiva/Sclera White and quiet White and quiet    Cornea Clear Clear    Anterior Chamber Deep and quiet Deep and quiet    Iris Round and reactive Round and reactive    Lens Centered posterior chamber intraocular lens, Open posterior capsule Centered posterior chamber intraocular lens, Open posterior capsule    Vitreous Normal Normal                   Tonometry     Tonometry (Applanation w Fluress drop, 4:20 PM)       Right Left    Pressure 12 13              Visual Acuity     Visual Acuity (Snellen - Linear)       Right Left    Dist cc 20/25 20/50 -2    Correction:  Glasses              Pupils     Pupils       Pupils React APD    Right PERRL Slow None    Left PERRL Slow None                Not recorded         Extraocular Movement     Extraocular Movement       Right Left     Full, Ortho Full, Ortho                IMPRESSION:  1. Glaucoma suspect, bilateral     2. Dermatochalasis, unspecified laterality     3. Pseudophakia of both eyes         PLAN:  Discussed all below with patient. 1. Glaucoma suspect, bilateral  monitor    2.

## 2017-12-11 ENCOUNTER — TELEPHONE (OUTPATIENT)
Dept: INTERNAL MEDICINE | Age: 82
End: 2017-12-11

## 2017-12-20 DIAGNOSIS — F41.9 ANXIETY: ICD-10-CM

## 2017-12-20 RX ORDER — LORAZEPAM 1 MG/1
1 TABLET ORAL EVERY 6 HOURS PRN
Qty: 120 TABLET | Refills: 0 | OUTPATIENT
Start: 2017-12-20 | End: 2018-02-16 | Stop reason: SDUPTHER

## 2018-02-13 ENCOUNTER — APPOINTMENT (OUTPATIENT)
Dept: GENERAL RADIOLOGY | Age: 83
End: 2018-02-13
Payer: MEDICARE

## 2018-02-13 ENCOUNTER — OFFICE VISIT (OUTPATIENT)
Dept: ORTHOPEDIC SURGERY | Age: 83
End: 2018-02-13
Payer: MEDICARE

## 2018-02-13 ENCOUNTER — HOSPITAL ENCOUNTER (EMERGENCY)
Age: 83
Discharge: HOME | End: 2018-02-13
Attending: EMERGENCY MEDICINE
Payer: MEDICARE

## 2018-02-13 VITALS
DIASTOLIC BLOOD PRESSURE: 55 MMHG | SYSTOLIC BLOOD PRESSURE: 117 MMHG | RESPIRATION RATE: 12 BRPM | BODY MASS INDEX: 17.81 KG/M2 | OXYGEN SATURATION: 96 % | WEIGHT: 98 LBS | TEMPERATURE: 97 F | HEART RATE: 85 BPM

## 2018-02-13 VITALS
SYSTOLIC BLOOD PRESSURE: 112 MMHG | BODY MASS INDEX: 18.05 KG/M2 | DIASTOLIC BLOOD PRESSURE: 60 MMHG | HEART RATE: 87 BPM | HEIGHT: 62 IN | WEIGHT: 98.11 LBS

## 2018-02-13 DIAGNOSIS — S42.221A 2-PART DISPLACED FRACTURE OF SURGICAL NECK OF RIGHT HUMERUS, INITIAL ENCOUNTER FOR CLOSED FRACTURE: Primary | ICD-10-CM

## 2018-02-13 DIAGNOSIS — S42.201A CLOSED FRACTURE OF PROXIMAL END OF RIGHT HUMERUS, UNSPECIFIED FRACTURE MORPHOLOGY, INITIAL ENCOUNTER: Primary | ICD-10-CM

## 2018-02-13 PROCEDURE — 99283 EMERGENCY DEPT VISIT LOW MDM: CPT

## 2018-02-13 PROCEDURE — 23600 CLTX PROX HUMRL FX W/O MNPJ: CPT | Performed by: PHYSICIAN ASSISTANT

## 2018-02-13 PROCEDURE — 73060 X-RAY EXAM OF HUMERUS: CPT

## 2018-02-13 PROCEDURE — 96372 THER/PROPH/DIAG INJ SC/IM: CPT

## 2018-02-13 PROCEDURE — 6360000002 HC RX W HCPCS: Performed by: EMERGENCY MEDICINE

## 2018-02-13 PROCEDURE — 71045 X-RAY EXAM CHEST 1 VIEW: CPT

## 2018-02-13 RX ORDER — HYDROCODONE BITARTRATE AND ACETAMINOPHEN 5; 325 MG/1; MG/1
1 TABLET ORAL EVERY 6 HOURS PRN
Qty: 25 TABLET | Refills: 0 | Status: SHIPPED | OUTPATIENT
Start: 2018-02-13 | End: 2018-02-20

## 2018-02-13 RX ORDER — NALBUPHINE HCL 10 MG/ML
5 AMPUL (ML) INJECTION ONCE
Status: COMPLETED | OUTPATIENT
Start: 2018-02-13 | End: 2018-02-13

## 2018-02-13 RX ORDER — ONDANSETRON 4 MG/1
4 TABLET, FILM COATED ORAL EVERY 6 HOURS PRN
Qty: 12 TABLET | Refills: 1 | Status: SHIPPED | OUTPATIENT
Start: 2018-02-13 | End: 2018-12-21 | Stop reason: ALTCHOICE

## 2018-02-13 RX ORDER — ONDANSETRON 4 MG/1
4 TABLET, FILM COATED ORAL ONCE
Status: COMPLETED | OUTPATIENT
Start: 2018-02-13 | End: 2018-02-13

## 2018-02-13 RX ADMIN — ONDANSETRON HYDROCHLORIDE 4 MG: 4 TABLET, FILM COATED ORAL at 08:28

## 2018-02-13 RX ADMIN — NALBUPHINE HYDROCHLORIDE 5 MG: 10 INJECTION, SOLUTION INTRAMUSCULAR; INTRAVENOUS; SUBCUTANEOUS at 08:24

## 2018-02-13 ASSESSMENT — PAIN DESCRIPTION - LOCATION: LOCATION: ARM

## 2018-02-13 ASSESSMENT — PAIN DESCRIPTION - ORIENTATION: ORIENTATION: RIGHT

## 2018-02-13 ASSESSMENT — PAIN SCALES - GENERAL
PAINLEVEL_OUTOF10: 0
PAINLEVEL_OUTOF10: 10

## 2018-02-13 ASSESSMENT — ENCOUNTER SYMPTOMS: COUGH: 1

## 2018-02-13 ASSESSMENT — PAIN DESCRIPTION - PROGRESSION: CLINICAL_PROGRESSION: GRADUALLY WORSENING

## 2018-02-13 ASSESSMENT — PAIN DESCRIPTION - ONSET: ONSET: ON-GOING

## 2018-02-13 ASSESSMENT — PAIN SCALES - WONG BAKER: WONGBAKER_NUMERICALRESPONSE: 0

## 2018-02-13 ASSESSMENT — PAIN DESCRIPTION - PAIN TYPE: TYPE: ACUTE PAIN

## 2018-02-13 ASSESSMENT — PAIN DESCRIPTION - FREQUENCY: FREQUENCY: CONTINUOUS

## 2018-02-13 NOTE — ED PROVIDER NOTES
Diverticulosis; Dry eye syndrome; Dry senile macular degeneration; Elevated fasting glucose; Fatigue; Gait abnormality; GERD (gastroesophageal reflux disease); Hyperlipidemia; Hypothyroidism; Intestinal adhesions; Iron deficiency anemia; Microalbuminuria; Migraines; Non-ST elevation myocardial infarction (NSTEMI) ([facility] [address] Osteoarthritis; Osteoporosis; Palpitations; Pseudophakos; Radiation exposure; Scoliosis; and Tobacco abuse. SURGICAL HISTORY      has a past surgical history that includes Tonsillectomy and adenoidectomy (1935); Appendectomy (1937); Abdominal exploration surgery (1950); Salpingo-oophorectomy (7337); partial hysterectomy (cervix not removed) (1955); other surgical history (1957); Cholecystectomy, open (1982); Cataract removal with implant (Right, 04/25/2000); Cataract removal with implant (Left, 07/18/2000); other surgical history (Right, 06/06/2000); Small intestine surgery (12/05/2008); Upper gastrointestinal endoscopy (01/17/2011); Colonoscopy (01/17/2011); Upper gastrointestinal endoscopy (10/22/2012); Colonoscopy (09/29/2008); Upper gastrointestinal endoscopy (11/15/2004); Colonoscopy (10/24/2001); sigmoidectomy; Tympanostomy tube placement; and other surgical history (10/07/2008). CURRENT MEDICATIONS       Previous Medications    ACETAMINOPHEN (TYLENOL) 500 MG TABLET    Take 500 mg by mouth every 6 hours as needed for Pain. ALBUTEROL (PROVENTIL HFA) 108 (90 BASE) MCG/ACT INHALER    Inhale 2 puffs into the lungs every 6 hours as needed for Wheezing or Shortness of Breath. ASCORBIC ACID (VITAMIN C) 500 MG TABLET    Take 1,000 mg by mouth daily. BIOTIN 5000 MCG CAPS    Take 2 tablets by mouth daily     CHOLECALCIFEROL (VITAMIN D3) 1000 UNITS TABS    Take 1 tablet by mouth daily.     CYANOCOBALAMIN (B-12) 1000 MCG CAPS    Take 1 tablet by mouth daily    DIPHENHYDRAMINE (BENADRYL) 25 MG CAPSULE    Take 25 mg by mouth nightly    DOCUSATE SODIUM (COLACE) 100 MG CAPSULE    Take 100 mg by mouth 2 times daily     FIBER POWD    Take by mouth nightly    LORATADINE (CLARITIN) 10 MG TABLET    Take 10 mg by mouth daily    LORAZEPAM (ATIVAN) 1 MG TABLET    Take 1 tablet by mouth every 6 hours as needed for Anxiety . MICONAZOLE (NEOSPORIN AF) 2 % CREAM    Apply topically 2 times daily Apply topically 2 times daily. MULTIPLE MINERALS-VITAMINS (CALCIUM-MAGNESIUM-ZINC-D3) TABS    Take 1 tablet by mouth daily    MULTIPLE VITAMINS-MINERALS (I-DANIA) TABS    Take 1 tablet by mouth 2 times daily    MULTIPLE VITAMINS-MINERALS (MULTIVITAMIN & MINERAL PO)    Take 1 tablet by mouth daily. NITROGLYCERIN (NITROSTAT) 0.4 MG SL TABLET    Place 0.4 mg under the tongue every 5 minutes as needed. PROBIOTIC PRODUCT (PROBIOTIC DAILY PO)    Take 1 tablet by mouth daily TruBiotics    PROPYLENE GLYCOL (SYSTANE BALANCE OP)    Apply 1 drop to eye 3 times daily     SENNA (SENOKOT) 8.6 MG TABLET    Take 2 tablets by mouth daily as needed     TIZANIDINE (ZANAFLEX) 4 MG TABLET    Take 1 tablet by mouth 3 times daily       ALLERGIES     is allergic to acyclovir and related; albuterol; amitriptyline; asa [aspirin]; betadine [povidone iodine]; biaxin [clarithromycin]; buspar [buspirone]; cefuroxime axetil; celestone [betamethasone]; codeine; darvocet a500 [propoxyphene n-acetaminophen]; dicyclomine hcl; doxycycline; esomeprazole magnesium; fluconazole; lidex [fluocinolone]; neomycin; pcn [penicillins]; protonix [pantoprazole]; quinolones; statins [statins]; tape [adhesive tape]; tramadol; valium; vicodin [hydrocodone-acetaminophen]; zithromax [azithromycin]; and hydrocodone. FAMILY HISTORY     indicated that her mother is . She indicated that her father is . She indicated that the status of her neg hx is unknown.      family history includes Alcohol Abuse in her father; Diabetes in her mother and other family members;  Heart Disease in her mother and another family member; High Blood Pressure in an other family

## 2018-02-13 NOTE — ED NOTES
Sling applied to right arm. Patient verbalizes understanding of care of use of sling.      Manuela Walter RN  02/13/18 [id number]

## 2018-02-16 ENCOUNTER — OFFICE VISIT (OUTPATIENT)
Dept: INTERNAL MEDICINE | Age: 83
End: 2018-02-16
Payer: MEDICARE

## 2018-02-16 VITALS
RESPIRATION RATE: 16 BRPM | HEIGHT: 63 IN | SYSTOLIC BLOOD PRESSURE: 132 MMHG | HEART RATE: 105 BPM | DIASTOLIC BLOOD PRESSURE: 73 MMHG | BODY MASS INDEX: 17.38 KG/M2 | WEIGHT: 98.11 LBS

## 2018-02-16 DIAGNOSIS — K21.9 GASTROESOPHAGEAL REFLUX DISEASE WITHOUT ESOPHAGITIS: ICD-10-CM

## 2018-02-16 DIAGNOSIS — S42.201D CLOSED FRACTURE OF PROXIMAL END OF RIGHT HUMERUS WITH ROUTINE HEALING, UNSPECIFIED FRACTURE MORPHOLOGY, SUBSEQUENT ENCOUNTER: Primary | ICD-10-CM

## 2018-02-16 DIAGNOSIS — Z72.0 TOBACCO ABUSE: ICD-10-CM

## 2018-02-16 DIAGNOSIS — D50.8 OTHER IRON DEFICIENCY ANEMIA: ICD-10-CM

## 2018-02-16 DIAGNOSIS — R73.01 ELEVATED FASTING GLUCOSE: ICD-10-CM

## 2018-02-16 DIAGNOSIS — Z91.81 AT HIGH RISK FOR FALLS: ICD-10-CM

## 2018-02-16 DIAGNOSIS — S42.291S OTHER CLOSED DISPLACED FRACTURE OF PROXIMAL END OF RIGHT HUMERUS, SEQUELA: Primary | ICD-10-CM

## 2018-02-16 DIAGNOSIS — R11.0 NAUSEA: ICD-10-CM

## 2018-02-16 DIAGNOSIS — F41.9 ANXIETY: ICD-10-CM

## 2018-02-16 DIAGNOSIS — E78.2 MIXED HYPERLIPIDEMIA: ICD-10-CM

## 2018-02-16 DIAGNOSIS — E03.8 OTHER SPECIFIED HYPOTHYROIDISM: ICD-10-CM

## 2018-02-16 DIAGNOSIS — M15.9 PRIMARY OSTEOARTHRITIS INVOLVING MULTIPLE JOINTS: ICD-10-CM

## 2018-02-16 PROCEDURE — 1090F PRES/ABSN URINE INCON ASSESS: CPT | Performed by: INTERNAL MEDICINE

## 2018-02-16 PROCEDURE — G8419 CALC BMI OUT NRM PARAM NOF/U: HCPCS | Performed by: INTERNAL MEDICINE

## 2018-02-16 PROCEDURE — 3288F FALL RISK ASSESSMENT DOCD: CPT | Performed by: INTERNAL MEDICINE

## 2018-02-16 PROCEDURE — 4040F PNEUMOC VAC/ADMIN/RCVD: CPT | Performed by: INTERNAL MEDICINE

## 2018-02-16 PROCEDURE — 4004F PT TOBACCO SCREEN RCVD TLK: CPT | Performed by: INTERNAL MEDICINE

## 2018-02-16 PROCEDURE — 99214 OFFICE O/P EST MOD 30 MIN: CPT | Performed by: INTERNAL MEDICINE

## 2018-02-16 PROCEDURE — G8484 FLU IMMUNIZE NO ADMIN: HCPCS | Performed by: INTERNAL MEDICINE

## 2018-02-16 PROCEDURE — G8599 NO ASA/ANTIPLAT THER USE RNG: HCPCS | Performed by: INTERNAL MEDICINE

## 2018-02-16 PROCEDURE — G8427 DOCREV CUR MEDS BY ELIG CLIN: HCPCS | Performed by: INTERNAL MEDICINE

## 2018-02-16 PROCEDURE — 1123F ACP DISCUSS/DSCN MKR DOCD: CPT | Performed by: INTERNAL MEDICINE

## 2018-02-16 RX ORDER — LORAZEPAM 1 MG/1
1 TABLET ORAL EVERY 6 HOURS PRN
Qty: 120 TABLET | Refills: 0 | Status: SHIPPED | OUTPATIENT
Start: 2018-02-16 | End: 2018-04-13 | Stop reason: SDUPTHER

## 2018-02-16 RX ORDER — OXYCODONE HYDROCHLORIDE AND ACETAMINOPHEN 5; 325 MG/1; MG/1
0.5 TABLET ORAL
Qty: 28 TABLET | Refills: 0 | Status: SHIPPED | OUTPATIENT
Start: 2018-02-16 | End: 2018-02-23

## 2018-02-16 NOTE — PATIENT INSTRUCTIONS
Patient Education        Eating Healthy Foods: Care Instructions  Your Care Instructions    Eating healthy foods can help lower your risk for disease. Healthy food gives you energy and keeps your heart strong, your brain active, your muscles working, and your bones strong. A healthy diet includes a variety of foods from the basic food groups: grains, vegetables, fruits, milk and milk products, and meat and beans. Some people may eat more of their favorite foods from only one food group and, as a result, miss getting the nutrients they need. So, it is important to pay attention not only to what you eat but also to what you are missing from your diet. You can eat a healthy, balanced diet by making a few small changes. Follow-up care is a key part of your treatment and safety. Be sure to make and go to all appointments, and call your doctor if you are having problems. It's also a good idea to know your test results and keep a list of the medicines you take. How can you care for yourself at home? Look at what you eat  · Keep a food diary for a week or two and record everything you eat or drink. Track the number of servings you eat from each food group. · For a balanced diet every day, eat a variety of:  ¨ 6 or more ounce-equivalents of grains, such as cereals, breads, crackers, rice, or pasta, every day. An ounce-equivalent is 1 slice of bread, 1 cup of ready-to-eat cereal, or ½ cup of cooked rice, cooked pasta, or cooked cereal.  ¨ 2½ cups of vegetables, especially:  § Dark-green vegetables such as broccoli and spinach. § Orange vegetables such as carrots and sweet potatoes. § Dry beans (such as chou and kidney beans) and peas (such as lentils). ¨ 2 cups of fresh, frozen, or canned fruit. A small apple or 1 banana or orange equals 1 cup. ¨ 3 cups of nonfat or low-fat milk, yogurt, or other milk products. ¨ 5½ ounces of meat and beans, such as chicken, fish, lean meat, beans, nuts, and seeds.  One egg, 1 Smoking. \"     If you do not have an account, please click on the \"Sign Up Now\" link. Current as of: March 20, 2017  Content Version: 11.5  © 5035-5136 [employer]. Care instructions adapted under license by [facility] ([facility]). If you have questions about a medical condition or this instruction, always ask your healthcare professional. [address] any warranty or liability for your use of this information. Patient Education        Stopping Smoking: Care Instructions  Your Care Instructions    Cigarette smokers crave the nicotine in cigarettes. Giving it up is much harder than simply changing a habit. Your body has to stop craving the nicotine. It is hard to quit, but you can do it. There are many tools that people use to quit smoking. You may find that combining tools works best for you. There are several steps to quitting. First you get ready to quit. Then you get support to help you. After that, you learn new skills and behaviors to become a nonsmoker. For many people, a necessary step is getting and using medicine. Your doctor will help you set up the plan that best meets your needs. You may want to attend a smoking cessation program to help you quit smoking. When you choose a program, look for one that has proven success. Ask your doctor for ideas. You will greatly increase your chances of success if you take medicine as well as get counseling or join a cessation program.  Some of the changes you feel when you first quit tobacco are uncomfortable. Your body will miss the nicotine at first, and you may feel short-tempered and grumpy. You may have trouble sleeping or concentrating. Medicine can help you deal with these symptoms. You may struggle with changing your smoking habits and rituals. The last step is the tricky one: Be prepared for the smoking urge to continue for a time. This is a lot to deal with, but keep at it. You will feel better.   Follow-up care is a key part of to know your test results and keep a list of the medicines you take. How can you care for yourself at home? Taking care of yourself  · You may get dizzy if you do not drink enough water. To prevent dehydration, drink plenty of fluids, enough so that your urine is light yellow or clear like water. Choose water and other caffeine-free clear liquids. If you have kidney, heart, or liver disease and have to limit fluids, talk with your doctor before you increase the amount of fluids you drink. · Exercise regularly to improve your strength, muscle tone, and balance. Walk if you can. Swimming may be a good choice if you cannot walk easily. · Have your vision and hearing checked each year or any time you notice a change. If you have trouble seeing and hearing, you might not be able to avoid objects and could lose your balance. · Know the side effects of the medicines you take. Ask your doctor or pharmacist whether the medicines you take can affect your balance. Sleeping pills or sedatives can affect your balance. · Limit the amount of alcohol you drink. Alcohol can impair your balance and other senses. · Ask your doctor whether calluses or corns on your feet need to be removed. If you wear loose-fitting shoes because of calluses or corns, you can lose your balance and fall. · Talk to your doctor if you have numbness in your feet. Preventing falls at home  · Remove raised doorway thresholds, throw rugs, and clutter. Repair loose carpet or raised areas in the floor. · Move furniture and electrical cords to keep them out of walking paths. · Use nonskid floor wax, and wipe up spills right away, especially on ceramic tile floors. · If you use a walker or cane, put rubber tips on it. If you use crutches, clean the bottoms of them regularly with an abrasive pad, such as steel wool. · Keep your house well lit, especially [place], and outside walkways. Use night-lights in areas such as hallways and bathrooms. 11.5  © 2254-1394 [employer]. Care instructions adapted under license by [facility] ([facility]). If you have questions about a medical condition or this instruction, always ask your healthcare professional. [address] any warranty or liability for your use of this information. Patient Education        How to Get Up Safely After a Fall: Care Instructions  Your Care Instructions    If you have injuries, health problems, or other reasons that may make it easy for you to fall at home, it is a good idea to learn how to get up safely after a fall. Learning how to get up correctly can help you avoid making an injury worse. Also, knowing what to do if you cannot get up can help you stay safe until help arrives. Follow-up care is a key part of your treatment and safety. Be sure to make and go to all appointments, and call your doctor if you are having problems. It's also a good idea to know your test results and keep a list of the medicines you take. How can you care for yourself after a fall? If you think you can get up  First lie still for a few minutes and think about how you feel. If your body feels okay and you think you can get up safely, follow the rest of the steps below:  1. Look for a chair or other piece of furniture that is close to you. 2. Roll onto your side and rest. Roll by turning your head in the direction you want to roll, move your shoulder and arm, then hip and leg in the same direction. 3. Lie still for a moment to let your blood pressure adjust.  4. Slowly push your upper body up, lift your head, and take a moment to rest.  5. Slowly get up on your hands and knees, and crawl to the chair or other stable piece of furniture. 6. Put your hands on the chair. 7. Move one foot forward, and place it flat on the floor. Your other leg should be bent with the knee on the floor. 8. Rise slowly, turn your body, and sit in the chair.  Stay seated for a bit and think about

## 2018-02-16 NOTE — PROGRESS NOTES
Visit Information    Have you changed or started any medications since your last visit including any over-the-counter medicines, vitamins, or herbal medicines? yes - norco zofran   Are you having any side effects from any of your medications? -  no  Have you stopped taking any of your medications? Is so, why? -  no    Have you seen any other physician or provider since your last visit? Yes - Records Obtained  Have you had any other diagnostic tests since your last visit? Yes - Records Obtained  Have you been seen in the emergency room and/or had an admission to a hospital since we last saw you? Yes - Records Obtained  Have you had your routine dental cleaning in the past 6 months? no    Have you activated your [employer] account? If not, what are your barriers?  No:      Patient Care Team:  Priscila Alvarez DO as PCP - General (Internal Medicine)  Priscila Alvarez DO as PCP - S Attributed Provider  Lyndon Evans MD as Surgeon (Ophthalmology)    Medical History Review  Past Medical, Family, and Social History reviewed and does contribute to the patient presenting condition    Health Maintenance   Topic Date Due    TSH testing  12/28/2017    Flu vaccine (1) 09/01/2018 (Originally 9/1/2017)    DTaP/Tdap/Td vaccine (2 - Td) 01/04/2037 (Originally 3/23/2008)    Pneumococcal low/med risk  Completed

## 2018-02-16 NOTE — PROGRESS NOTES
depression, 10-14 = Moderate depression, 15-19 = Moderately severe depression, 20-27 = Severe depression

## 2018-02-16 NOTE — PROGRESS NOTES
Controlled Substances Monitoring: The Prescription Monitoring Report for this patient was reviewed today. Dimas Song, )    Obtaining appropriate analgesic effect of treatment., No signs of potential drug abuse or diversion identified. , Possible medication side effects, risk of tolerance/dependence & alternative treatments discussed. Dimas Song, )    Prescription exceeds daily limit for a specific reason. See comments or note., Severe pain not adequately treated with lower dose.  (Sophronia Aase, )

## 2018-02-16 NOTE — PROGRESS NOTES
DR. Eve Sosa - PROGRESS NOTE    CHIEF COMPLAINT/HISTORY OF CHIEF COMPLAINT: This 80 y.o.  female comes in today for ongoing evaluation and management of her elevated fasting glucose, hyperlipidemia, hypothyroidism, chronic obstructive pulmonary disease, osteoarthritis, gastroesophageal reflux disease, osteoarthritis, iron deficiency anemia, anxiety, and tobacco abuse. She fell on 2/12/18 and fractured her right humerus. She went to the emergency room at [facility] on [facility]/13/18 for it. She was referred to orthopedics and saw them later that same day. They told her that given her age and comorbid health conditions that she would not be a surgical candidate. They put her arm in a sling and she was given some Norco for the pain. Her daughter and son-in-law had to cancel a trip in order to come and help her because of the fracture. She is in a lot of pain despite the Norco. It only helps a little bit and it \"makes her head feel weird. \" The Fredick Ferraris also makes her sick to her stomach. She does have some Zofran ordered for nausea, but she hasn't used a lot of it yet. She was supposed to get blood work done for today but did not because of her injury. She takes Ativan for anxiety, which has been worse since she fractured her arm, so she has been taking it a little more frequently. She needs a refill for it. She takes iron pills for iron deficiency anemia. She has a history of hyperlipidemia which she is trying to control with diet and exercise. She tries to control her elevated fasting glucose with diet and exercise as well. Her gastroesophageal reflux disease hasn't bothered her recently. She has chronic obstructive pulmonary disease and continues to smoke 1 pack of cigarettes a day. Her breathing has been \"about the same. \" Otherwise she seems to be doing fairly well and denies any other complaints.          ALLERGIES/INTOLERANCES:   Allergies   Allergen Reactions    Acyclovir And Related Diarrhea and Nausea Only    Albuterol     Amitriptyline      Dizziness      Asa [Aspirin]      Stomach upset      Betadine [Povidone Iodine]     Biaxin [Clarithromycin] Nausea Only    Buspar [Buspirone]     Cefuroxime Axetil      Diarrhea      Celestone [Betamethasone]     Codeine     Darvocet A500 [Propoxyphene N-Acetaminophen]     Dicyclomine Hcl     Doxycycline Nausea Only    Esomeprazole Magnesium     Fluconazole     Lidex [Fluocinolone]     Neomycin     Pcn [Penicillins] Hives    Protonix [Pantoprazole]     Quinolones Hives    Statins [Statins]      Leg cramps      Tape [Adhesive Tape]     Tramadol      \"makes her walk into walls\"    Valium     Vicodin [Hydrocodone-Acetaminophen]     Zithromax [Azithromycin]      Nausea      Hydrocodone Nausea Only       MEDICATIONS:   Outpatient Prescriptions Marked as Taking for the 2/16/18 encounter (Office Visit) with Thang Fox, DO   Medication Sig Dispense Refill    HYDROcodone-acetaminophen (NORCO) 5-325 MG per tablet Take 1 tablet by mouth every 6 hours as needed for Pain for up to 7 days. 25 tablet 0    ondansetron (ZOFRAN) 4 MG tablet Take 1 tablet by mouth every 6 hours as needed for Nausea 12 tablet 1    LORazepam (ATIVAN) 1 MG tablet Take 1 tablet by mouth every 6 hours as needed for Anxiety . 120 tablet 0    loratadine (CLARITIN) 10 MG tablet Take 10 mg by mouth daily      Multiple Minerals-Vitamins (CALCIUM-MAGNESIUM-ZINC-D3) TABS Take 1 tablet by mouth daily      diphenhydrAMINE (BENADRYL) 25 MG capsule Take 25 mg by mouth nightly      Fiber POWD Take by mouth nightly      Multiple Vitamins-Minerals (I-DANIA) TABS Take 1 tablet by mouth 2 times daily      Cyanocobalamin (B-12) 1000 MCG CAPS Take 1 tablet by mouth daily      miconazole (NEOSPORIN AF) 2 % cream Apply topically 2 times daily Apply topically 2 times daily.       tiZANidine (ZANAFLEX) 4 MG tablet Take 1 tablet by mouth 3 times daily 90 tablet 2    anxiety  negative for - depression     PHYSICAL EXAMINATION:    Wt Readings from Last 2 Encounters:   02/16/18 98 lb 1.7 oz (44.5 kg)   02/13/18 98 lb 1.7 oz (44.5 kg)       Vitals: /73 (Site: Left Arm, Position: Sitting, Cuff Size: Medium Adult)   Pulse 105   Resp 16   Ht 5' 2.99\" (1.6 m)   Wt 98 lb 1.7 oz (44.5 kg)   BMI 17.38 kg/m²   General: This is a 80 y.o.  female who is alert and oriented to person, place and time. She appears to be her stated age and does not appear to be in any acute distress. Skin: Skin color, texture, turgor normal. No rashes or lesions. HEENT/Neck: Head: Normal, normocephalic, atraumatic. Eye: Normal external eye, conjunctiva, lids cornea, BLAIR. Ears: Normal TM's bilaterally. Normal auditory canals and external ears. Non-tender. Nose: Normal external nose, mucus membranes and septum. Pharynx: Dental Hygiene adequate. Normal buccal mucosa. Normal pharynx. Neck / Thyroid: Supple, no masses, nodes, nodules or enlargement. Lungs: Normal - CTA without rales, rhonchi, or wheezing. Heart: regular rate and rhythm, S1, S2 normal, no murmur, click, rub or gallop No S3 or S4. Abdomen: Non-obese, soft, non-distended, non-tender, normal active bowel sounds, no masses palpated and no hepatosplenomegaly  Extremities: There is no clubbing, cyanosis, edema. Right arm is in a sling  Neurologic:  cranial nerves II-XII are grossly intact  Osteopathic Structural Examination: Unable to perform secondary to the patient being in a wheelchair     ASSESSMENT/PLAN:    1.  Closed fracture of proximal end of right humerus with routine healing, unspecified fracture morphology, subsequent encounter  - She is still in a significant amount of pain from the fracture  - We will switch her from [address] to Percocet and give her half a pill every three hours as needed  - She may also use a heating pad and/or ice as necessary to help soothe the pain  - She may also try some NSAIDs such as Advil or Standing Status:   Future     Standing Expiration Date:   2/16/2019    Comprehensive Metabolic Panel     Standing Status:   Future     Standing Expiration Date:   2/16/2019    Lipid Panel     Standing Status:   Future     Standing Expiration Date:   2/16/2019     Order Specific Question:   Is Patient Fasting?/# of Hours     Answer:   15    Urinalysis with Microscopic     Standing Status:   Future     Standing Expiration Date:   2/16/2019     Order Specific Question:   SPECIFY(EX-CATH,MIDSTREAM,CYSTO,ETC)? Answer:   midstream    TSH without Reflex     Standing Status:   Future     Standing Expiration Date:   2/16/2019    T4, Free     Standing Status:   Future     Standing Expiration Date:   2/16/2019    Ferritin     Standing Status:   Future     Standing Expiration Date:   2/16/2019    Iron and TIBC     Standing Status:   Future     Standing Expiration Date:   2/16/2019     Order Specific Question:   Is Patient Fasting? Answer:   no     Order Specific Question:   No of Hours? Answer:   na       Requested Prescriptions     Signed Prescriptions Disp Refills    oxyCODONE-acetaminophen (PERCOCET) 5-325 MG per tablet 28 tablet 0     Sig: Take 0.5 tablets by mouth every 3 hours as needed for Pain for up to 7 days.  LORazepam (ATIVAN) 1 MG tablet 120 tablet 0     Sig: Take 1 tablet by mouth every 6 hours as needed for Anxiety for up to 30 days. Labs and medications as ordered above. Return in about 3 months (around 5/16/2018).         Electronically signed by Danelle Lamas DO on 2/16/2018 at 2:31 PM  Internal Medicine

## 2018-02-19 DIAGNOSIS — S42.291S CLOSED FRACTURE OF HEAD OF RIGHT HUMERUS, SEQUELA: Primary | ICD-10-CM

## 2018-02-20 ENCOUNTER — HOSPITAL ENCOUNTER (OUTPATIENT)
Dept: GENERAL RADIOLOGY | Age: 83
Discharge: HOME | End: 2018-02-22
Payer: MEDICARE

## 2018-02-20 ENCOUNTER — OFFICE VISIT (OUTPATIENT)
Dept: ORTHOPEDIC SURGERY | Age: 83
End: 2018-02-20

## 2018-02-20 VITALS
HEIGHT: 62 IN | BODY MASS INDEX: 18.03 KG/M2 | WEIGHT: 98 LBS | SYSTOLIC BLOOD PRESSURE: 120 MMHG | HEART RATE: 100 BPM | DIASTOLIC BLOOD PRESSURE: 62 MMHG

## 2018-02-20 DIAGNOSIS — S42.291D OTHER CLOSED DISPLACED FRACTURE OF PROXIMAL END OF RIGHT HUMERUS WITH ROUTINE HEALING, SUBSEQUENT ENCOUNTER: ICD-10-CM

## 2018-02-20 DIAGNOSIS — S42.291S CLOSED FRACTURE OF HEAD OF RIGHT HUMERUS, SEQUELA: ICD-10-CM

## 2018-02-20 PROBLEM — S42.201D CLOSED FRACTURE OF PROXIMAL END OF RIGHT HUMERUS WITH ROUTINE HEALING: Status: ACTIVE | Noted: 2018-02-20

## 2018-02-20 PROCEDURE — 73060 X-RAY EXAM OF HUMERUS: CPT

## 2018-02-20 PROCEDURE — 99024 POSTOP FOLLOW-UP VISIT: CPT | Performed by: ORTHOPAEDIC SURGERY

## 2018-02-20 RX ORDER — OXYCODONE HYDROCHLORIDE AND ACETAMINOPHEN 5; 325 MG/1; MG/1
1-2 TABLET ORAL EVERY 6 HOURS PRN
Qty: 60 TABLET | Refills: 0 | Status: SHIPPED | OUTPATIENT
Start: 2018-02-20 | End: 2018-02-27

## 2018-02-23 ENCOUNTER — TELEPHONE (OUTPATIENT)
Dept: INTERNAL MEDICINE | Age: 83
End: 2018-02-23

## 2018-02-23 NOTE — TELEPHONE ENCOUNTER
Last appt: 2/16/2018  Next appt: 5/18/2018    Patient daughter called in saying they would like a script for a hospital bed and bedside commode. The daughter said they will pick the script up on Monday.

## 2018-02-26 ENCOUNTER — TELEPHONE (OUTPATIENT)
Dept: INTERNAL MEDICINE | Age: 83
End: 2018-02-26

## 2018-02-26 NOTE — TELEPHONE ENCOUNTER
Patient has been having constipation problems for the last week. They have given her 2 fleet enemas and can feel the bowel constipation substance when giving. She is taking colace and Fiber as directed. Please advise. Patient is very uncomfortable and feet are swelling and they do not want to give her another fleets d/t to the sodium content of it. Patient is eating better now and drinking.

## 2018-03-08 DIAGNOSIS — S42.291S OTHER CLOSED DISPLACED FRACTURE OF PROXIMAL END OF RIGHT HUMERUS, SEQUELA: Primary | ICD-10-CM

## 2018-03-13 ENCOUNTER — OFFICE VISIT (OUTPATIENT)
Dept: ORTHOPEDIC SURGERY | Age: 83
End: 2018-03-13

## 2018-03-13 ENCOUNTER — HOSPITAL ENCOUNTER (OUTPATIENT)
Dept: GENERAL RADIOLOGY | Age: 83
Discharge: HOME | End: 2018-03-15
Payer: MEDICARE

## 2018-03-13 VITALS
BODY MASS INDEX: 18.03 KG/M2 | SYSTOLIC BLOOD PRESSURE: 114 MMHG | HEIGHT: 62 IN | DIASTOLIC BLOOD PRESSURE: 74 MMHG | WEIGHT: 98 LBS

## 2018-03-13 DIAGNOSIS — S42.291S OTHER CLOSED DISPLACED FRACTURE OF PROXIMAL END OF RIGHT HUMERUS, SEQUELA: ICD-10-CM

## 2018-03-13 DIAGNOSIS — S42.291D OTHER CLOSED DISPLACED FRACTURE OF PROXIMAL END OF RIGHT HUMERUS WITH ROUTINE HEALING, SUBSEQUENT ENCOUNTER: Primary | ICD-10-CM

## 2018-03-13 PROCEDURE — 73060 X-RAY EXAM OF HUMERUS: CPT

## 2018-03-13 PROCEDURE — 99024 POSTOP FOLLOW-UP VISIT: CPT | Performed by: PHYSICIAN ASSISTANT

## 2018-03-13 RX ORDER — OXYCODONE HYDROCHLORIDE AND ACETAMINOPHEN 5; 325 MG/1; MG/1
1-2 TABLET ORAL EVERY 4 HOURS PRN
Qty: 50 TABLET | Refills: 0 | Status: SHIPPED | OUTPATIENT
Start: 2018-03-13 | End: 2018-04-03

## 2018-03-29 DIAGNOSIS — S42.201S CLOSED FRACTURE OF PROXIMAL END OF RIGHT HUMERUS, UNSPECIFIED FRACTURE MORPHOLOGY, SEQUELA: Primary | ICD-10-CM

## 2018-04-03 ENCOUNTER — OFFICE VISIT (OUTPATIENT)
Dept: ORTHOPEDIC SURGERY | Age: 83
End: 2018-04-03

## 2018-04-03 ENCOUNTER — HOSPITAL ENCOUNTER (OUTPATIENT)
Dept: GENERAL RADIOLOGY | Age: 83
Discharge: HOME | End: 2018-04-05
Payer: MEDICARE

## 2018-04-03 VITALS
HEIGHT: 62 IN | DIASTOLIC BLOOD PRESSURE: 72 MMHG | HEART RATE: 78 BPM | WEIGHT: 98 LBS | SYSTOLIC BLOOD PRESSURE: 128 MMHG | BODY MASS INDEX: 18.03 KG/M2

## 2018-04-03 DIAGNOSIS — S42.291D OTHER CLOSED DISPLACED FRACTURE OF PROXIMAL END OF RIGHT HUMERUS WITH ROUTINE HEALING, SUBSEQUENT ENCOUNTER: ICD-10-CM

## 2018-04-03 DIAGNOSIS — S42.201S CLOSED FRACTURE OF PROXIMAL END OF RIGHT HUMERUS, UNSPECIFIED FRACTURE MORPHOLOGY, SEQUELA: ICD-10-CM

## 2018-04-03 DIAGNOSIS — S42.291D OTHER CLOSED DISPLACED FRACTURE OF PROXIMAL END OF RIGHT HUMERUS WITH ROUTINE HEALING, SUBSEQUENT ENCOUNTER: Primary | ICD-10-CM

## 2018-04-03 PROCEDURE — 99024 POSTOP FOLLOW-UP VISIT: CPT | Performed by: PHYSICIAN ASSISTANT

## 2018-04-03 PROCEDURE — 73060 X-RAY EXAM OF HUMERUS: CPT

## 2018-04-03 PROCEDURE — 73080 X-RAY EXAM OF ELBOW: CPT

## 2018-04-03 RX ORDER — HYDROCODONE BITARTRATE AND ACETAMINOPHEN 5; 325 MG/1; MG/1
1 TABLET ORAL EVERY 6 HOURS PRN
Qty: 40 TABLET | Refills: 0 | Status: SHIPPED | OUTPATIENT
Start: 2018-04-03 | End: 2018-04-10

## 2018-04-13 DIAGNOSIS — F41.9 ANXIETY: ICD-10-CM

## 2018-04-13 RX ORDER — LORAZEPAM 1 MG/1
1 TABLET ORAL EVERY 6 HOURS PRN
Qty: 120 TABLET | Refills: 0 | OUTPATIENT
Start: 2018-04-13 | End: 2018-04-18 | Stop reason: SDUPTHER

## 2018-04-17 ENCOUNTER — TELEPHONE (OUTPATIENT)
Dept: ORTHOPEDIC SURGERY | Age: 83
End: 2018-04-17

## 2018-04-17 DIAGNOSIS — S42.291D OTHER CLOSED DISPLACED FRACTURE OF PROXIMAL END OF RIGHT HUMERUS WITH ROUTINE HEALING, SUBSEQUENT ENCOUNTER: Primary | ICD-10-CM

## 2018-04-17 RX ORDER — HYDROCODONE BITARTRATE AND ACETAMINOPHEN 5; 325 MG/1; MG/1
1 TABLET ORAL EVERY 6 HOURS PRN
Qty: 50 TABLET | Refills: 0 | Status: SHIPPED | OUTPATIENT
Start: 2018-04-17 | End: 2018-04-24

## 2018-04-18 DIAGNOSIS — F41.9 ANXIETY: ICD-10-CM

## 2018-04-18 RX ORDER — LORAZEPAM 1 MG/1
1 TABLET ORAL EVERY 6 HOURS PRN
Qty: 120 TABLET | Refills: 0 | OUTPATIENT
Start: 2018-04-18 | End: 2018-05-18 | Stop reason: SDUPTHER

## 2018-04-19 ENCOUNTER — TELEPHONE (OUTPATIENT)
Dept: ORTHOPEDIC SURGERY | Age: 83
End: 2018-04-19

## 2018-05-15 ENCOUNTER — HOSPITAL ENCOUNTER (OUTPATIENT)
Dept: GENERAL RADIOLOGY | Age: 83
Discharge: HOME | End: 2018-05-17
Payer: MEDICARE

## 2018-05-15 ENCOUNTER — OFFICE VISIT (OUTPATIENT)
Dept: ORTHOPEDIC SURGERY | Age: 83
End: 2018-05-15
Payer: MEDICARE

## 2018-05-15 VITALS
BODY MASS INDEX: 18.03 KG/M2 | WEIGHT: 98 LBS | HEIGHT: 62 IN | DIASTOLIC BLOOD PRESSURE: 76 MMHG | SYSTOLIC BLOOD PRESSURE: 116 MMHG | HEART RATE: 62 BPM

## 2018-05-15 DIAGNOSIS — S42.291D OTHER CLOSED DISPLACED FRACTURE OF PROXIMAL END OF RIGHT HUMERUS WITH ROUTINE HEALING, SUBSEQUENT ENCOUNTER: Primary | ICD-10-CM

## 2018-05-15 DIAGNOSIS — S42.291D OTHER CLOSED DISPLACED FRACTURE OF PROXIMAL END OF RIGHT HUMERUS WITH ROUTINE HEALING, SUBSEQUENT ENCOUNTER: ICD-10-CM

## 2018-05-15 PROCEDURE — 99212 OFFICE O/P EST SF 10 MIN: CPT | Performed by: PHYSICIAN ASSISTANT

## 2018-05-15 PROCEDURE — 4040F PNEUMOC VAC/ADMIN/RCVD: CPT | Performed by: PHYSICIAN ASSISTANT

## 2018-05-15 PROCEDURE — 4004F PT TOBACCO SCREEN RCVD TLK: CPT | Performed by: PHYSICIAN ASSISTANT

## 2018-05-15 PROCEDURE — G8419 CALC BMI OUT NRM PARAM NOF/U: HCPCS | Performed by: PHYSICIAN ASSISTANT

## 2018-05-15 PROCEDURE — 1090F PRES/ABSN URINE INCON ASSESS: CPT | Performed by: PHYSICIAN ASSISTANT

## 2018-05-15 PROCEDURE — 73030 X-RAY EXAM OF SHOULDER: CPT

## 2018-05-15 PROCEDURE — G8427 DOCREV CUR MEDS BY ELIG CLIN: HCPCS | Performed by: PHYSICIAN ASSISTANT

## 2018-05-15 PROCEDURE — 3288F FALL RISK ASSESSMENT DOCD: CPT | Performed by: PHYSICIAN ASSISTANT

## 2018-05-15 PROCEDURE — G8599 NO ASA/ANTIPLAT THER USE RNG: HCPCS | Performed by: PHYSICIAN ASSISTANT

## 2018-05-15 PROCEDURE — 1123F ACP DISCUSS/DSCN MKR DOCD: CPT | Performed by: PHYSICIAN ASSISTANT

## 2018-05-15 RX ORDER — HYDROCODONE BITARTRATE AND ACETAMINOPHEN 5; 325 MG/1; MG/1
1 TABLET ORAL EVERY 8 HOURS PRN
Qty: 30 TABLET | Refills: 0 | Status: SHIPPED | OUTPATIENT
Start: 2018-05-15 | End: 2018-05-20

## 2018-05-18 ENCOUNTER — HOSPITAL ENCOUNTER (OUTPATIENT)
Dept: LAB | Age: 83
Setting detail: SPECIMEN
Discharge: HOME | End: 2018-05-18
Payer: MEDICARE

## 2018-05-18 ENCOUNTER — OFFICE VISIT (OUTPATIENT)
Dept: INTERNAL MEDICINE | Age: 83
End: 2018-05-18
Payer: MEDICARE

## 2018-05-18 VITALS
BODY MASS INDEX: 16.16 KG/M2 | SYSTOLIC BLOOD PRESSURE: 96 MMHG | DIASTOLIC BLOOD PRESSURE: 60 MMHG | HEIGHT: 63 IN | HEART RATE: 78 BPM | WEIGHT: 91.2 LBS | RESPIRATION RATE: 16 BRPM

## 2018-05-18 DIAGNOSIS — I25.10 CORONARY ARTERY DISEASE INVOLVING NATIVE CORONARY ARTERY OF NATIVE HEART, ANGINA PRESENCE UNSPECIFIED: ICD-10-CM

## 2018-05-18 DIAGNOSIS — S42.291D OTHER CLOSED DISPLACED FRACTURE OF PROXIMAL END OF RIGHT HUMERUS WITH ROUTINE HEALING, SUBSEQUENT ENCOUNTER: ICD-10-CM

## 2018-05-18 DIAGNOSIS — R73.01 ELEVATED FASTING GLUCOSE: ICD-10-CM

## 2018-05-18 DIAGNOSIS — J43.8 OTHER EMPHYSEMA (HCC): ICD-10-CM

## 2018-05-18 DIAGNOSIS — S51.001D ELBOW WOUND, RIGHT, SUBSEQUENT ENCOUNTER: ICD-10-CM

## 2018-05-18 DIAGNOSIS — K21.9 GASTROESOPHAGEAL REFLUX DISEASE WITHOUT ESOPHAGITIS: ICD-10-CM

## 2018-05-18 DIAGNOSIS — E78.2 MIXED HYPERLIPIDEMIA: ICD-10-CM

## 2018-05-18 DIAGNOSIS — E03.8 OTHER SPECIFIED HYPOTHYROIDISM: ICD-10-CM

## 2018-05-18 DIAGNOSIS — R73.01 ELEVATED FASTING GLUCOSE: Primary | ICD-10-CM

## 2018-05-18 DIAGNOSIS — D50.8 OTHER IRON DEFICIENCY ANEMIA: ICD-10-CM

## 2018-05-18 DIAGNOSIS — Z72.0 TOBACCO ABUSE: ICD-10-CM

## 2018-05-18 DIAGNOSIS — F41.9 ANXIETY: ICD-10-CM

## 2018-05-18 DIAGNOSIS — D50.8 IRON DEFICIENCY ANEMIA SECONDARY TO INADEQUATE DIETARY IRON INTAKE: ICD-10-CM

## 2018-05-18 LAB
-: NORMAL
ABSOLUTE EOS #: 0.1 K/UL (ref 0–0.4)
ABSOLUTE IMMATURE GRANULOCYTE: ABNORMAL K/UL (ref 0–0.3)
ABSOLUTE LYMPH #: 2.6 K/UL (ref 1–4.8)
ABSOLUTE MONO #: 0.7 K/UL (ref 0.1–1.2)
ALBUMIN SERPL-MCNC: 4.4 G/DL (ref 3.5–5.2)
ALBUMIN/GLOBULIN RATIO: 1.3 (ref 1–2.5)
ALP BLD-CCNC: 93 U/L (ref 35–104)
ALT SERPL-CCNC: 11 U/L (ref 5–33)
AMORPHOUS: NORMAL
ANION GAP SERPL CALCULATED.3IONS-SCNC: 12 MMOL/L (ref 9–17)
AST SERPL-CCNC: 22 U/L
BACTERIA: NORMAL
BASOPHILS # BLD: 1 % (ref 0–1)
BASOPHILS ABSOLUTE: 0.1 K/UL (ref 0–0.2)
BILIRUB SERPL-MCNC: 0.26 MG/DL (ref 0.3–1.2)
BILIRUBIN URINE: NEGATIVE
BUN BLDV-MCNC: 13 MG/DL (ref 8–23)
BUN/CREAT BLD: 23 (ref 9–20)
CALCIUM SERPL-MCNC: 10.1 MG/DL (ref 8.6–10.4)
CASTS UA: NORMAL /LPF (ref 0–2)
CHLORIDE BLD-SCNC: 98 MMOL/L (ref 98–107)
CHOLESTEROL/HDL RATIO: 2.8
CHOLESTEROL: 194 MG/DL
CO2: 29 MMOL/L (ref 20–31)
COLOR: NORMAL
COMMENT UA: NORMAL
CREAT SERPL-MCNC: 0.56 MG/DL (ref 0.5–0.9)
CRYSTALS, UA: NORMAL /HPF
DIFFERENTIAL TYPE: ABNORMAL
EOSINOPHILS RELATIVE PERCENT: 1 % (ref 1–7)
EPITHELIAL CELLS UA: NORMAL /HPF (ref 0–5)
ESTIMATED AVERAGE GLUCOSE: 105 MG/DL
FERRITIN: 37 UG/L (ref 13–150)
GFR AFRICAN AMERICAN: >60 ML/MIN
GFR NON-AFRICAN AMERICAN: >60 ML/MIN
GFR SERPL CREATININE-BSD FRML MDRD: ABNORMAL ML/MIN/{1.73_M2}
GFR SERPL CREATININE-BSD FRML MDRD: ABNORMAL ML/MIN/{1.73_M2}
GLUCOSE BLD-MCNC: 96 MG/DL (ref 70–99)
GLUCOSE URINE: NEGATIVE
HBA1C MFR BLD: 5.3 % (ref 4.8–5.9)
HCT VFR BLD CALC: 34.8 % (ref 36–46)
HDLC SERPL-MCNC: 69 MG/DL
HEMOGLOBIN: 10.8 G/DL (ref 12–16)
IMMATURE GRANULOCYTES: ABNORMAL %
IRON SATURATION: 5 % (ref 20–55)
IRON: 21 UG/DL (ref 37–145)
KETONES, URINE: NEGATIVE
LDL CHOLESTEROL: 102 MG/DL (ref 0–130)
LEUKOCYTE ESTERASE, URINE: NEGATIVE
LYMPHOCYTES # BLD: 27 % (ref 16–46)
MCH RBC QN AUTO: 25.2 PG (ref 26–34)
MCHC RBC AUTO-ENTMCNC: 31.1 G/DL (ref 31–37)
MCV RBC AUTO: 81.1 FL (ref 80–100)
MONOCYTES # BLD: 8 % (ref 4–11)
MUCUS: NORMAL
NITRITE, URINE: NEGATIVE
NRBC AUTOMATED: ABNORMAL PER 100 WBC
OTHER OBSERVATIONS UA: NORMAL
PDW BLD-RTO: 17.5 % (ref 11–14.5)
PH UA: 6 (ref 5–6)
PLATELET # BLD: 581 K/UL (ref 140–450)
PLATELET ESTIMATE: ABNORMAL
PMV BLD AUTO: 8 FL (ref 6–12)
POTASSIUM SERPL-SCNC: 4.1 MMOL/L (ref 3.7–5.3)
PROTEIN UA: NEGATIVE
RBC # BLD: 4.29 M/UL (ref 4–5.2)
RBC # BLD: ABNORMAL 10*6/UL
RBC UA: NORMAL /HPF (ref 0–4)
RENAL EPITHELIAL, UA: NORMAL /HPF
SEG NEUTROPHILS: 63 % (ref 43–77)
SEGMENTED NEUTROPHILS ABSOLUTE COUNT: 6.1 K/UL (ref 1.8–7.7)
SODIUM BLD-SCNC: 139 MMOL/L (ref 135–144)
SPECIFIC GRAVITY UA: 1.01 (ref 1.01–1.02)
THYROXINE, FREE: 1.18 NG/DL (ref 0.93–1.7)
TOTAL IRON BINDING CAPACITY: 382 UG/DL (ref 250–450)
TOTAL PROTEIN: 7.8 G/DL (ref 6.4–8.3)
TRICHOMONAS: NORMAL
TRIGL SERPL-MCNC: 116 MG/DL
TSH SERPL DL<=0.05 MIU/L-ACNC: 2.62 MIU/L (ref 0.3–5)
TURBIDITY: NORMAL
UNSATURATED IRON BINDING CAPACITY: 361 UG/DL (ref 112–347)
URINE HGB: NEGATIVE
UROBILINOGEN, URINE: NORMAL
VLDLC SERPL CALC-MCNC: NORMAL MG/DL (ref 1–30)
WBC # BLD: 9.6 K/UL (ref 3.5–11)
WBC # BLD: ABNORMAL 10*3/UL
WBC UA: NORMAL /HPF (ref 0–4)
YEAST: NORMAL

## 2018-05-18 PROCEDURE — 36415 COLL VENOUS BLD VENIPUNCTURE: CPT

## 2018-05-18 PROCEDURE — 84439 ASSAY OF FREE THYROXINE: CPT

## 2018-05-18 PROCEDURE — 99214 OFFICE O/P EST MOD 30 MIN: CPT | Performed by: INTERNAL MEDICINE

## 2018-05-18 PROCEDURE — 4040F PNEUMOC VAC/ADMIN/RCVD: CPT | Performed by: INTERNAL MEDICINE

## 2018-05-18 PROCEDURE — G8926 SPIRO NO PERF OR DOC: HCPCS | Performed by: INTERNAL MEDICINE

## 2018-05-18 PROCEDURE — 4004F PT TOBACCO SCREEN RCVD TLK: CPT | Performed by: INTERNAL MEDICINE

## 2018-05-18 PROCEDURE — 3288F FALL RISK ASSESSMENT DOCD: CPT | Performed by: INTERNAL MEDICINE

## 2018-05-18 PROCEDURE — 81001 URINALYSIS AUTO W/SCOPE: CPT

## 2018-05-18 PROCEDURE — 83036 HEMOGLOBIN GLYCOSYLATED A1C: CPT

## 2018-05-18 PROCEDURE — G8419 CALC BMI OUT NRM PARAM NOF/U: HCPCS | Performed by: INTERNAL MEDICINE

## 2018-05-18 PROCEDURE — 1123F ACP DISCUSS/DSCN MKR DOCD: CPT | Performed by: INTERNAL MEDICINE

## 2018-05-18 PROCEDURE — 83540 ASSAY OF IRON: CPT

## 2018-05-18 PROCEDURE — 80053 COMPREHEN METABOLIC PANEL: CPT

## 2018-05-18 PROCEDURE — 85025 COMPLETE CBC W/AUTO DIFF WBC: CPT

## 2018-05-18 PROCEDURE — 84443 ASSAY THYROID STIM HORMONE: CPT

## 2018-05-18 PROCEDURE — 3023F SPIROM DOC REV: CPT | Performed by: INTERNAL MEDICINE

## 2018-05-18 PROCEDURE — G8599 NO ASA/ANTIPLAT THER USE RNG: HCPCS | Performed by: INTERNAL MEDICINE

## 2018-05-18 PROCEDURE — 1090F PRES/ABSN URINE INCON ASSESS: CPT | Performed by: INTERNAL MEDICINE

## 2018-05-18 PROCEDURE — G8427 DOCREV CUR MEDS BY ELIG CLIN: HCPCS | Performed by: INTERNAL MEDICINE

## 2018-05-18 PROCEDURE — 83550 IRON BINDING TEST: CPT

## 2018-05-18 PROCEDURE — 80061 LIPID PANEL: CPT

## 2018-05-18 PROCEDURE — 82728 ASSAY OF FERRITIN: CPT

## 2018-05-18 RX ORDER — LORAZEPAM 1 MG/1
1 TABLET ORAL EVERY 6 HOURS PRN
Qty: 120 TABLET | Refills: 0 | Status: SHIPPED | OUTPATIENT
Start: 2018-05-18 | End: 2018-07-26 | Stop reason: SDUPTHER

## 2018-05-18 ASSESSMENT — PATIENT HEALTH QUESTIONNAIRE - PHQ9
2. FEELING DOWN, DEPRESSED OR HOPELESS: 0
1. LITTLE INTEREST OR PLEASURE IN DOING THINGS: 0
SUM OF ALL RESPONSES TO PHQ QUESTIONS 1-9: 0
SUM OF ALL RESPONSES TO PHQ9 QUESTIONS 1 & 2: 0

## 2018-05-22 RX ORDER — FERROUS SULFATE 325(65) MG
325 TABLET ORAL
COMMUNITY
End: 2018-12-21 | Stop reason: SDUPTHER

## 2018-06-21 DIAGNOSIS — S42.294S OTHER CLOSED NONDISPLACED FRACTURE OF PROXIMAL END OF RIGHT HUMERUS, SEQUELA: Primary | ICD-10-CM

## 2018-06-26 ENCOUNTER — OFFICE VISIT (OUTPATIENT)
Dept: ORTHOPEDIC SURGERY | Age: 83
End: 2018-06-26
Payer: MEDICARE

## 2018-06-26 ENCOUNTER — HOSPITAL ENCOUNTER (OUTPATIENT)
Dept: GENERAL RADIOLOGY | Age: 83
Discharge: HOME | End: 2018-06-28
Payer: MEDICARE

## 2018-06-26 VITALS
BODY MASS INDEX: 16.75 KG/M2 | DIASTOLIC BLOOD PRESSURE: 70 MMHG | HEIGHT: 62 IN | HEART RATE: 68 BPM | SYSTOLIC BLOOD PRESSURE: 114 MMHG | WEIGHT: 91 LBS

## 2018-06-26 DIAGNOSIS — S42.294S OTHER CLOSED NONDISPLACED FRACTURE OF PROXIMAL END OF RIGHT HUMERUS, SEQUELA: ICD-10-CM

## 2018-06-26 DIAGNOSIS — S42.291D OTHER CLOSED DISPLACED FRACTURE OF PROXIMAL END OF RIGHT HUMERUS WITH ROUTINE HEALING, SUBSEQUENT ENCOUNTER: Primary | ICD-10-CM

## 2018-06-26 PROCEDURE — 99212 OFFICE O/P EST SF 10 MIN: CPT | Performed by: NURSE PRACTITIONER

## 2018-06-26 PROCEDURE — 1090F PRES/ABSN URINE INCON ASSESS: CPT | Performed by: NURSE PRACTITIONER

## 2018-06-26 PROCEDURE — 4040F PNEUMOC VAC/ADMIN/RCVD: CPT | Performed by: NURSE PRACTITIONER

## 2018-06-26 PROCEDURE — G8599 NO ASA/ANTIPLAT THER USE RNG: HCPCS | Performed by: NURSE PRACTITIONER

## 2018-06-26 PROCEDURE — 1123F ACP DISCUSS/DSCN MKR DOCD: CPT | Performed by: NURSE PRACTITIONER

## 2018-06-26 PROCEDURE — 3288F FALL RISK ASSESSMENT DOCD: CPT | Performed by: NURSE PRACTITIONER

## 2018-06-26 PROCEDURE — 73060 X-RAY EXAM OF HUMERUS: CPT

## 2018-06-26 PROCEDURE — G8419 CALC BMI OUT NRM PARAM NOF/U: HCPCS | Performed by: NURSE PRACTITIONER

## 2018-06-26 PROCEDURE — 4004F PT TOBACCO SCREEN RCVD TLK: CPT | Performed by: NURSE PRACTITIONER

## 2018-06-26 PROCEDURE — G8427 DOCREV CUR MEDS BY ELIG CLIN: HCPCS | Performed by: NURSE PRACTITIONER

## 2018-06-26 RX ORDER — HYDROCODONE BITARTRATE AND ACETAMINOPHEN 5; 325 MG/1; MG/1
1-2 TABLET ORAL EVERY 6 HOURS PRN
Qty: 40 TABLET | Refills: 0 | Status: SHIPPED | OUTPATIENT
Start: 2018-06-26 | End: 2018-07-03

## 2018-07-12 ENCOUNTER — TELEPHONE (OUTPATIENT)
Dept: ORTHOPEDIC SURGERY | Age: 83
End: 2018-07-12

## 2018-07-12 DIAGNOSIS — S42.293S: Primary | ICD-10-CM

## 2018-07-12 RX ORDER — HYDROCODONE BITARTRATE AND ACETAMINOPHEN 5; 325 MG/1; MG/1
1-2 TABLET ORAL EVERY 4 HOURS PRN
Qty: 50 TABLET | Refills: 0 | Status: SHIPPED | OUTPATIENT
Start: 2018-07-12 | End: 2018-07-19

## 2018-07-26 ENCOUNTER — OFFICE VISIT (OUTPATIENT)
Dept: INTERNAL MEDICINE | Age: 83
End: 2018-07-26
Payer: MEDICARE

## 2018-07-26 VITALS
TEMPERATURE: 99.2 F | WEIGHT: 91 LBS | RESPIRATION RATE: 16 BRPM | SYSTOLIC BLOOD PRESSURE: 118 MMHG | HEART RATE: 100 BPM | DIASTOLIC BLOOD PRESSURE: 62 MMHG | OXYGEN SATURATION: 95 % | BODY MASS INDEX: 16.12 KG/M2 | HEIGHT: 63 IN

## 2018-07-26 DIAGNOSIS — K21.9 GASTROESOPHAGEAL REFLUX DISEASE WITHOUT ESOPHAGITIS: ICD-10-CM

## 2018-07-26 DIAGNOSIS — Z72.0 TOBACCO ABUSE: ICD-10-CM

## 2018-07-26 DIAGNOSIS — J31.0 CHRONIC RHINITIS, UNSPECIFIED TYPE: Primary | ICD-10-CM

## 2018-07-26 DIAGNOSIS — F41.9 ANXIETY: ICD-10-CM

## 2018-07-26 DIAGNOSIS — J43.8 OTHER EMPHYSEMA (HCC): ICD-10-CM

## 2018-07-26 PROCEDURE — 1123F ACP DISCUSS/DSCN MKR DOCD: CPT | Performed by: NURSE PRACTITIONER

## 2018-07-26 PROCEDURE — G8427 DOCREV CUR MEDS BY ELIG CLIN: HCPCS | Performed by: NURSE PRACTITIONER

## 2018-07-26 PROCEDURE — 3288F FALL RISK ASSESSMENT DOCD: CPT | Performed by: NURSE PRACTITIONER

## 2018-07-26 PROCEDURE — G8599 NO ASA/ANTIPLAT THER USE RNG: HCPCS | Performed by: NURSE PRACTITIONER

## 2018-07-26 PROCEDURE — 3023F SPIROM DOC REV: CPT | Performed by: NURSE PRACTITIONER

## 2018-07-26 PROCEDURE — G8419 CALC BMI OUT NRM PARAM NOF/U: HCPCS | Performed by: NURSE PRACTITIONER

## 2018-07-26 PROCEDURE — G8926 SPIRO NO PERF OR DOC: HCPCS | Performed by: NURSE PRACTITIONER

## 2018-07-26 PROCEDURE — 1090F PRES/ABSN URINE INCON ASSESS: CPT | Performed by: NURSE PRACTITIONER

## 2018-07-26 PROCEDURE — 4004F PT TOBACCO SCREEN RCVD TLK: CPT | Performed by: NURSE PRACTITIONER

## 2018-07-26 PROCEDURE — 4040F PNEUMOC VAC/ADMIN/RCVD: CPT | Performed by: NURSE PRACTITIONER

## 2018-07-26 PROCEDURE — 1100F PTFALLS ASSESS-DOCD GE2>/YR: CPT | Performed by: NURSE PRACTITIONER

## 2018-07-26 PROCEDURE — 99214 OFFICE O/P EST MOD 30 MIN: CPT | Performed by: NURSE PRACTITIONER

## 2018-07-26 RX ORDER — VARENICLINE TARTRATE 25 MG
KIT ORAL
Qty: 53 TABLET | Refills: 0 | Status: SHIPPED | OUTPATIENT
Start: 2018-07-26 | End: 2018-12-21

## 2018-07-26 RX ORDER — MONTELUKAST SODIUM 10 MG/1
10 TABLET ORAL DAILY
Qty: 30 TABLET | Refills: 0 | Status: SHIPPED | OUTPATIENT
Start: 2018-07-26 | End: 2018-09-04 | Stop reason: SDUPTHER

## 2018-07-26 RX ORDER — LORAZEPAM 1 MG/1
1 TABLET ORAL EVERY 6 HOURS PRN
Qty: 120 TABLET | Refills: 0 | Status: SHIPPED | OUTPATIENT
Start: 2018-07-26 | End: 2018-12-06 | Stop reason: SDUPTHER

## 2018-07-26 RX ORDER — HYDROCODONE BITARTRATE AND ACETAMINOPHEN 5; 325 MG/1; MG/1
1 TABLET ORAL NIGHTLY
COMMUNITY
End: 2018-08-29 | Stop reason: SDUPTHER

## 2018-07-26 RX ORDER — CETIRIZINE HYDROCHLORIDE 10 MG/1
10 TABLET ORAL DAILY
COMMUNITY
End: 2020-12-15

## 2018-07-27 NOTE — PROGRESS NOTES
pregnancy    Diverticulosis     Dry eye syndrome     Dry senile macular degeneration     Elevated fasting glucose     Fatigue     Gait abnormality     Probably secondary to proprioceptive defects of age.  GERD (gastroesophageal reflux disease)     Hyperlipidemia     Hypothyroidism     Intestinal adhesions     Iron deficiency anemia     Microalbuminuria     Migraines     Non-ST elevation myocardial infarction (NSTEMI) (HCC)     anteroseptal    Osteoarthritis     Osteoporosis     Palpitations     Pseudophakos     Bilateral.    Radiation exposure age 2    nasopharyngeal radiation    Scoliosis     Tobacco abuse        Past Surgical History:   Procedure Laterality Date    ABDOMINAL EXPLORATION SURGERY  1950    with lysis of adhesions    APPENDECTOMY  1937    CATARACT REMOVAL WITH IMPLANT Right 04/25/2000    CATARACT REMOVAL WITH IMPLANT Left 07/18/2000    CHOLECYSTECTOMY, OPEN  1982    COLONOSCOPY  01/17/2011    Good anastomosis from previous diverticular resection, residual diverticulosis.  COLONOSCOPY  09/29/2008    With EGD showing mild gastritis, moderate sigmiod diverticulosis, internal hemorrhoids.  COLONOSCOPY  10/24/2001    With EGD showing mild antral gastritis, mild erythema of the cecum, small internal hemorrhoid. [facility]    [place] treatment to both ears.  OTHER SURGICAL HISTORY Right 06/06/2000    Laser treatment of \"secondary cataract\".  OTHER SURGICAL HISTORY  10/07/2008    capsule endoscopy    PARTIAL HYSTERECTOMY  1955    Left ovary, tube and uterus removed.  SALPINGO-OOPHORECTOMY  1951    Right ovary and tube removed.  SIGMOIDECTOMY      laparoscopic    SMALL INTESTINE SURGERY  12/05/2008    And lysis of adhesions for diverticular disease. Nabil Brown Session    UPPER GASTROINTESTINAL ENDOSCOPY  01/17/2011    Mild gastritis.     UPPER GASTROINTESTINAL tenderness. Tympanic membrane is not perforated, not erythematous, not retracted and not bulging. Nose: Rhinorrhea present. No mucosal edema or nose lacerations. Right sinus exhibits no maxillary sinus tenderness and no frontal sinus tenderness. Left sinus exhibits no maxillary sinus tenderness and no frontal sinus tenderness. Mouth/Throat: Uvula is midline and mucous membranes are normal. Mucous membranes are not pale and not dry. No uvula swelling. No oropharyngeal exudate, posterior oropharyngeal edema, posterior oropharyngeal erythema or tonsillar abscesses. Eyes: Right eye exhibits no discharge. Left eye exhibits no discharge. Neck: Neck supple. No tracheal deviation present. Cardiovascular: Normal rate, regular rhythm and normal heart sounds. Exam reveals no gallop and no friction rub. No murmur heard. Pulmonary/Chest: Effort normal and breath sounds normal. No respiratory distress. She has no wheezes. She has no rales. She exhibits no tenderness. Abdominal: Soft. Bowel sounds are normal. She exhibits no distension. There is no tenderness. Musculoskeletal: She exhibits no edema. Neurological: She is alert and oriented to person, place, and time. No cranial nerve deficit. Gait normal. Coordination normal.   [place]    Skin: Skin is warm and dry. No rash noted. She is not diaphoretic. No pallor. Psychiatric: Mood, affect and judgment normal.   Nursing note and vitals reviewed. Vitals:    07/26/18 1503   BP: 118/62   Site: Left Arm   Position: Sitting   Cuff Size: Small Adult   Pulse: 100   Resp: 16   Temp: 99.2 °F (37.3 °C)   TempSrc: Tympanic   SpO2: 95%   Weight: 91 lb (41.3 kg)   Height: 5' 2.8\" (1.595 m)       Assessment:  1. Chronic rhinitis, unspecified type  Continue on antihistamine. Add Singulair. 2. Anxiety  Refill og   - LORazepam (ATIVAN) 1 MG tablet; Take 1 tablet by mouth every 6 hours as needed for Anxiety for up to 30 days. .  Dispense: 120 tablet; Refill: 0    3.  Other emphysema ([address]  Smoking cessation    4. Tobacco abuse  Chantix- discussed medication and SE with daughter and pt wish to proceed with medication    5. Gastroesophageal reflux disease without esophagitis  Stable off medication      Plan:  As noted above. Follow up for routine visit. Call sooner with concerns prior.     Electronically signed by OPHELIA Mcconnell CNP on 7/26/2018 at 10:47 PM

## 2018-07-29 ASSESSMENT — ENCOUNTER SYMPTOMS
SORE THROAT: 0
EYE REDNESS: 0
BLOOD IN STOOL: 0
SINUS PAIN: 0
SHORTNESS OF BREATH: 0
HEARTBURN: 0
SPUTUM PRODUCTION: 1
EYE DISCHARGE: 0
ABDOMINAL PAIN: 0
VOMITING: 0
WHEEZING: 0
COUGH: 1
DIARRHEA: 0
NAUSEA: 0
EYE PAIN: 0
ORTHOPNEA: 0
CONSTIPATION: 0

## 2018-08-16 DIAGNOSIS — S42.291D OTHER CLOSED DISPLACED FRACTURE OF PROXIMAL END OF RIGHT HUMERUS WITH ROUTINE HEALING, SUBSEQUENT ENCOUNTER: Primary | ICD-10-CM

## 2018-08-29 DIAGNOSIS — G89.29 OTHER CHRONIC PAIN: Primary | ICD-10-CM

## 2018-08-29 RX ORDER — HYDROCODONE BITARTRATE AND ACETAMINOPHEN 5; 325 MG/1; MG/1
1 TABLET ORAL NIGHTLY PRN
Qty: 20 TABLET | Refills: 0 | Status: SHIPPED | OUTPATIENT
Start: 2018-08-29 | End: 2018-10-01 | Stop reason: SDUPTHER

## 2018-09-04 ENCOUNTER — HOSPITAL ENCOUNTER (OUTPATIENT)
Dept: LAB | Age: 83
Setting detail: SPECIMEN
Discharge: HOME | End: 2018-09-04
Payer: MEDICARE

## 2018-09-04 ENCOUNTER — HOSPITAL ENCOUNTER (OUTPATIENT)
Dept: GENERAL RADIOLOGY | Age: 83
Discharge: HOME | End: 2018-09-06
Payer: MEDICARE

## 2018-09-04 ENCOUNTER — OFFICE VISIT (OUTPATIENT)
Dept: ORTHOPEDIC SURGERY | Age: 83
End: 2018-09-04
Payer: MEDICARE

## 2018-09-04 ENCOUNTER — OFFICE VISIT (OUTPATIENT)
Dept: INTERNAL MEDICINE | Age: 83
End: 2018-09-04
Payer: MEDICARE

## 2018-09-04 VITALS
WEIGHT: 91 LBS | BODY MASS INDEX: 16.75 KG/M2 | HEART RATE: 64 BPM | HEIGHT: 62 IN | SYSTOLIC BLOOD PRESSURE: 138 MMHG | DIASTOLIC BLOOD PRESSURE: 64 MMHG

## 2018-09-04 VITALS
BODY MASS INDEX: 16.83 KG/M2 | WEIGHT: 92 LBS | TEMPERATURE: 97.5 F | HEART RATE: 74 BPM | DIASTOLIC BLOOD PRESSURE: 60 MMHG | SYSTOLIC BLOOD PRESSURE: 110 MMHG

## 2018-09-04 DIAGNOSIS — H91.90 HEARING LOSS, UNSPECIFIED HEARING LOSS TYPE, UNSPECIFIED LATERALITY: ICD-10-CM

## 2018-09-04 DIAGNOSIS — J43.8 OTHER EMPHYSEMA (HCC): ICD-10-CM

## 2018-09-04 DIAGNOSIS — E03.8 OTHER SPECIFIED HYPOTHYROIDISM: ICD-10-CM

## 2018-09-04 DIAGNOSIS — R73.01 ELEVATED FASTING GLUCOSE: ICD-10-CM

## 2018-09-04 DIAGNOSIS — F41.9 ANXIETY: ICD-10-CM

## 2018-09-04 DIAGNOSIS — I25.10 CORONARY ARTERY DISEASE INVOLVING NATIVE CORONARY ARTERY OF NATIVE HEART, ANGINA PRESENCE UNSPECIFIED: ICD-10-CM

## 2018-09-04 DIAGNOSIS — H61.23 BILATERAL IMPACTED CERUMEN: ICD-10-CM

## 2018-09-04 DIAGNOSIS — S42.291D OTHER CLOSED DISPLACED FRACTURE OF PROXIMAL END OF RIGHT HUMERUS WITH ROUTINE HEALING, SUBSEQUENT ENCOUNTER: ICD-10-CM

## 2018-09-04 DIAGNOSIS — K21.9 GASTROESOPHAGEAL REFLUX DISEASE WITHOUT ESOPHAGITIS: ICD-10-CM

## 2018-09-04 DIAGNOSIS — Z00.00 ROUTINE GENERAL MEDICAL EXAMINATION AT A HEALTH CARE FACILITY: Primary | ICD-10-CM

## 2018-09-04 DIAGNOSIS — M81.0 OSTEOPOROSIS, UNSPECIFIED OSTEOPOROSIS TYPE, UNSPECIFIED PATHOLOGICAL FRACTURE PRESENCE: ICD-10-CM

## 2018-09-04 DIAGNOSIS — E78.2 MIXED HYPERLIPIDEMIA: ICD-10-CM

## 2018-09-04 DIAGNOSIS — D50.8 IRON DEFICIENCY ANEMIA SECONDARY TO INADEQUATE DIETARY IRON INTAKE: ICD-10-CM

## 2018-09-04 DIAGNOSIS — M15.9 PRIMARY OSTEOARTHRITIS INVOLVING MULTIPLE JOINTS: ICD-10-CM

## 2018-09-04 DIAGNOSIS — S42.293S: Primary | ICD-10-CM

## 2018-09-04 LAB
ABSOLUTE EOS #: 0.2 K/UL (ref 0–0.4)
ABSOLUTE IMMATURE GRANULOCYTE: ABNORMAL K/UL (ref 0–0.3)
ABSOLUTE LYMPH #: 2 K/UL (ref 1–4.8)
ABSOLUTE MONO #: 0.8 K/UL (ref 0.1–1.2)
ANION GAP SERPL CALCULATED.3IONS-SCNC: 11 MMOL/L (ref 9–17)
BASOPHILS # BLD: 1 % (ref 0–1)
BASOPHILS ABSOLUTE: 0.1 K/UL (ref 0–0.2)
BUN BLDV-MCNC: 14 MG/DL (ref 8–23)
BUN/CREAT BLD: 22 (ref 9–20)
CALCIUM SERPL-MCNC: 9.7 MG/DL (ref 8.6–10.4)
CHLORIDE BLD-SCNC: 101 MMOL/L (ref 98–107)
CO2: 28 MMOL/L (ref 20–31)
CREAT SERPL-MCNC: 0.64 MG/DL (ref 0.5–0.9)
DIFFERENTIAL TYPE: ABNORMAL
EOSINOPHILS RELATIVE PERCENT: 3 % (ref 1–7)
ESTIMATED AVERAGE GLUCOSE: 94 MG/DL
FERRITIN: 20 UG/L (ref 13–150)
GFR AFRICAN AMERICAN: >60 ML/MIN
GFR NON-AFRICAN AMERICAN: >60 ML/MIN
GFR SERPL CREATININE-BSD FRML MDRD: ABNORMAL ML/MIN/{1.73_M2}
GFR SERPL CREATININE-BSD FRML MDRD: ABNORMAL ML/MIN/{1.73_M2}
GLUCOSE BLD-MCNC: 93 MG/DL (ref 70–99)
HBA1C MFR BLD: 4.9 % (ref 4.8–5.9)
HCT VFR BLD CALC: 33.9 % (ref 36–46)
HEMOGLOBIN: 10.6 G/DL (ref 12–16)
IMMATURE GRANULOCYTES: ABNORMAL %
IRON SATURATION: 7 % (ref 20–55)
IRON: 29 UG/DL (ref 37–145)
LYMPHOCYTES # BLD: 28 % (ref 16–46)
MCH RBC QN AUTO: 25.1 PG (ref 26–34)
MCHC RBC AUTO-ENTMCNC: 31.4 G/DL (ref 31–37)
MCV RBC AUTO: 79.9 FL (ref 80–100)
MONOCYTES # BLD: 11 % (ref 4–11)
NRBC AUTOMATED: ABNORMAL PER 100 WBC
PDW BLD-RTO: 18.9 % (ref 11–14.5)
PLATELET # BLD: 423 K/UL (ref 140–450)
PLATELET ESTIMATE: ABNORMAL
PMV BLD AUTO: 7.8 FL (ref 6–12)
POTASSIUM SERPL-SCNC: 3.9 MMOL/L (ref 3.7–5.3)
RBC # BLD: 4.24 M/UL (ref 4–5.2)
RBC # BLD: ABNORMAL 10*6/UL
SEG NEUTROPHILS: 57 % (ref 43–77)
SEGMENTED NEUTROPHILS ABSOLUTE COUNT: 4.2 K/UL (ref 1.8–7.7)
SODIUM BLD-SCNC: 140 MMOL/L (ref 135–144)
TOTAL IRON BINDING CAPACITY: 405 UG/DL (ref 250–450)
UNSATURATED IRON BINDING CAPACITY: 376 UG/DL (ref 112–347)
WBC # BLD: 7.2 K/UL (ref 3.5–11)
WBC # BLD: ABNORMAL 10*3/UL

## 2018-09-04 PROCEDURE — 1123F ACP DISCUSS/DSCN MKR DOCD: CPT | Performed by: PHYSICIAN ASSISTANT

## 2018-09-04 PROCEDURE — 3288F FALL RISK ASSESSMENT DOCD: CPT | Performed by: PHYSICIAN ASSISTANT

## 2018-09-04 PROCEDURE — 73060 X-RAY EXAM OF HUMERUS: CPT

## 2018-09-04 PROCEDURE — 4004F PT TOBACCO SCREEN RCVD TLK: CPT | Performed by: PHYSICIAN ASSISTANT

## 2018-09-04 PROCEDURE — 99212 OFFICE O/P EST SF 10 MIN: CPT | Performed by: PHYSICIAN ASSISTANT

## 2018-09-04 PROCEDURE — 99214 OFFICE O/P EST MOD 30 MIN: CPT | Performed by: NURSE PRACTITIONER

## 2018-09-04 PROCEDURE — 36415 COLL VENOUS BLD VENIPUNCTURE: CPT

## 2018-09-04 PROCEDURE — 80048 BASIC METABOLIC PNL TOTAL CA: CPT

## 2018-09-04 PROCEDURE — G8427 DOCREV CUR MEDS BY ELIG CLIN: HCPCS | Performed by: PHYSICIAN ASSISTANT

## 2018-09-04 PROCEDURE — G8419 CALC BMI OUT NRM PARAM NOF/U: HCPCS | Performed by: PHYSICIAN ASSISTANT

## 2018-09-04 PROCEDURE — 83550 IRON BINDING TEST: CPT

## 2018-09-04 PROCEDURE — 1100F PTFALLS ASSESS-DOCD GE2>/YR: CPT | Performed by: PHYSICIAN ASSISTANT

## 2018-09-04 PROCEDURE — G8599 NO ASA/ANTIPLAT THER USE RNG: HCPCS | Performed by: PHYSICIAN ASSISTANT

## 2018-09-04 PROCEDURE — 85025 COMPLETE CBC W/AUTO DIFF WBC: CPT

## 2018-09-04 PROCEDURE — 1090F PRES/ABSN URINE INCON ASSESS: CPT | Performed by: PHYSICIAN ASSISTANT

## 2018-09-04 PROCEDURE — 83036 HEMOGLOBIN GLYCOSYLATED A1C: CPT

## 2018-09-04 PROCEDURE — G0439 PPPS, SUBSEQ VISIT: HCPCS | Performed by: NURSE PRACTITIONER

## 2018-09-04 PROCEDURE — 4040F PNEUMOC VAC/ADMIN/RCVD: CPT | Performed by: PHYSICIAN ASSISTANT

## 2018-09-04 PROCEDURE — G0444 DEPRESSION SCREEN ANNUAL: HCPCS | Performed by: NURSE PRACTITIONER

## 2018-09-04 PROCEDURE — 4040F PNEUMOC VAC/ADMIN/RCVD: CPT | Performed by: NURSE PRACTITIONER

## 2018-09-04 PROCEDURE — G8599 NO ASA/ANTIPLAT THER USE RNG: HCPCS | Performed by: NURSE PRACTITIONER

## 2018-09-04 PROCEDURE — 83540 ASSAY OF IRON: CPT

## 2018-09-04 PROCEDURE — 82728 ASSAY OF FERRITIN: CPT

## 2018-09-04 RX ORDER — SERTRALINE HYDROCHLORIDE 25 MG/1
12.5 TABLET, FILM COATED ORAL DAILY
Qty: 90 TABLET | Refills: 0 | Status: SHIPPED | OUTPATIENT
Start: 2018-09-04 | End: 2018-11-01 | Stop reason: SDUPTHER

## 2018-09-04 ASSESSMENT — ENCOUNTER SYMPTOMS
NAUSEA: 1
SORE THROAT: 0
SHORTNESS OF BREATH: 0
DIARRHEA: 0
VOMITING: 0
WHEEZING: 0
CHEST TIGHTNESS: 0

## 2018-09-04 ASSESSMENT — ANXIETY QUESTIONNAIRES: GAD7 TOTAL SCORE: 2

## 2018-09-04 ASSESSMENT — LIFESTYLE VARIABLES: HOW OFTEN DO YOU HAVE A DRINK CONTAINING ALCOHOL: 0

## 2018-09-04 ASSESSMENT — PATIENT HEALTH QUESTIONNAIRE - PHQ9: SUM OF ALL RESPONSES TO PHQ QUESTIONS 1-9: 13

## 2018-09-04 NOTE — PROGRESS NOTES
TempSrc: Tympanic   Weight: 92 lb (41.7 kg)     Physical Exam   Constitutional: She appears well-developed and well-nourished. No distress. HENT:   Head: Normocephalic and atraumatic. Right Ear: External ear normal.   Left Ear: External ear normal.   Nose: Nose normal. No rhinorrhea or nasal deformity. Mouth/Throat: Oropharynx is clear and moist. No oropharyngeal exudate. Bilateral cerumen impaction   Eyes: Conjunctivae, EOM and lids are normal.   Neck: Neck supple. No thyromegaly present. Cardiovascular: Normal rate and regular rhythm. PMI is not displaced. No murmur heard. Pulmonary/Chest: Effort normal and breath sounds normal. No accessory muscle usage. No respiratory distress. She has no wheezes. She has no rhonchi. She has no rales. Abdominal: Soft. There is no tenderness. Musculoskeletal: Normal range of motion. She exhibits no edema. Lymphadenopathy:     She has no cervical adenopathy. Neurological: She is alert. Coordination and gait normal.   Skin: Skin is warm and dry. No cyanosis. Nails show no clubbing. Psychiatric: She has a normal mood and affect. Her speech is normal and behavior is normal.   Nursing note and vitals reviewed. Assessment/Plan:        1. Routine general medical examination at a health care facility    2. Elevated fasting glucose, stable  - A1C 4.9, continue efforts at diet  - Basic Metabolic Panel; Future  - Hemoglobin A1C; Future    3. Mixed hyperlipidemia,  stable  - Continue to monitor    4. Coronary artery disease involving native coronary artery of native heart, angina presence unspecified,  stable  - Continue to monitor    5. Other specified hypothyroidism,  stable  - Continue to monitor    6. Other emphysema ([address]  stable  - Continue to monitor    7. Primary osteoarthritis involving multiple joints,  stable  - Continue to monitor    8. Gastroesophageal reflux disease without esophagitis,  stable  - Continue to monitor    9.  Osteoporosis, unspecified osteoporosis type, unspecified pathological fracture presence, stable  - She is still following with orthopedics for a humerus fracture. Not currently on any medication for osteoporosis. Will check DEXA, continue to follow with ortho. - DEXA Bone Density 2 Sites; Future    10. Iron deficiency anemia secondary to inadequate dietary iron intake, uncontrolled  - She has not been taking her iron supplements, due to GI side effects, CBC is stable from previous but still not well controlled. Discussed tactics to help with this and advised to take at least twice daily. Recheck in 6 weeks. - CBC; Future  - Ferritin; Future  - Iron and TIBC; Future    11. Anxiety, new problem  - Discussed treatment options with patient and her daughter, will start sertaline  - sertraline (ZOLOFT) 25 MG tablet; Take 0.5 tablets by mouth daily  Dispense: 90 tablet; Refill: 0    12. Hearing loss, unspecified hearing loss type, unspecified laterality, new problem  - Has been noticing problem prior to cerumen impaction, noted below. Will refer to audiology  - External Referral To Audiology    13. Bilateral impacted cerumen, new problem  - Office irrigation unsuccessful, will refer to ENT for removal prior to evaluation by audiology  - Delma Alonso MD, ENT, [place]        Return in about 3 months (around 12/4/2018) for 3 months recheck, Medicare Annual Wellness Visit in 1 year. Orders Placed This Encounter   Procedures    DEXA Bone Density 2 Sites     Standing Status:   Future     Standing Expiration Date:   9/4/2019     Order Specific Question:   Reason for exam:     Answer:   history fracture and osteoporosis    CBC     Standing Status:   Future     Standing Expiration Date:   9/4/2019    Ferritin     Standing Status:   Future     Standing Expiration Date:   9/4/2019    Iron and TIBC     Standing Status:   Future     Standing Expiration Date:   9/4/2019     Order Specific Question:   Is Patient Fasting? Answer:   no     Order Specific Question:   No of Hours? Answer:   0    Basic Metabolic Panel     Standing Status:   Future     Standing Expiration Date:   9/4/2019    Hemoglobin A1C     Standing Status:   Future     Standing Expiration Date:   9/4/2019    External Referral To Audiology     Referral Priority:   Routine     Referral Type:   Eval and Treat     Referral Reason:   Specialty Services Required     Requested Specialty:   Audiology     Number of Visits Requested:   Philip Garibay MD, ENT, [place]     Referral Priority:   Routine     Referral Type:   Eval and Treat     Referral Reason:   Specialty Services Required     Referred to Provider:   Diana Saeed MD     Requested Specialty:   Otolaryngology     Number of Visits Requested:   1     Orders Placed This Encounter   Medications    sertraline (ZOLOFT) 25 MG tablet     Sig: Take 0.5 tablets by mouth daily     Dispense:  90 tablet     Refill:  0       All patient questions answered. Pt voiced understanding.              Electronically signed by EUFEMIA Zambrano on 9/5/2018 at 12:04 PM

## 2018-09-04 NOTE — PROGRESS NOTES
tablet by mouth daily Yes OPHELIA Méndez CNP   ferrous sulfate 325 (65 Fe) MG tablet Take 325 mg by mouth daily (with breakfast) Yes Historical Provider, MD   ondansetron (ZOFRAN) 4 MG tablet Take 1 tablet by mouth every 6 hours as needed for Nausea Yes Abdoul Villalobos PA-C   Multiple Minerals-Vitamins (CALCIUM-MAGNESIUM-ZINC-D3) TABS Take 1 tablet by mouth daily Yes Historical Provider, MD   diphenhydrAMINE (BENADRYL) 25 MG capsule Take 25 mg by mouth nightly Yes Historical Provider, MD   Fiber POWD Take by mouth nightly Yes Historical Provider, MD   Multiple Vitamins-Minerals (I-DANIA) TABS Take 1 tablet by mouth 2 times daily Yes Historical Provider, MD   Cyanocobalamin (B-12) 1000 MCG CAPS Take 1 tablet by mouth daily Yes Historical Provider, MD   miconazole (NEOSPORIN AF) 2 % cream Apply topically 2 times daily Apply topically 2 times daily. Yes Historical Provider, MD   tiZANidine (ZANAFLEX) 4 MG tablet Take 1 tablet by mouth 3 times daily Yes Perez Song DO   Propylene Glycol (SYSTANE BALANCE OP) Apply 1 drop to eye 3 times daily  Yes Historical Provider, MD   albuterol (PROVENTIL HFA) 108 (90 BASE) MCG/ACT inhaler Inhale 2 puffs into the lungs every 6 hours as needed for Wheezing or Shortness of Breath. Yes OPHELIA Méndez CNP   acetaminophen (TYLENOL) 500 MG tablet Take 500 mg by mouth every 6 hours as needed for Pain. Yes Historical Provider, MD   Probiotic Product (PROBIOTIC DAILY PO) Take 1 tablet by mouth daily TruBiotics Yes Historical Provider, MD   Multiple Vitamins-Minerals (MULTIVITAMIN & MINERAL PO) Take 1 tablet by mouth daily. Yes Historical Provider, MD   Cholecalciferol (VITAMIN D3) 1000 UNITS TABS Take 1 tablet by mouth daily. Yes Historical Provider, MD   senna (SENOKOT) 8.6 MG tablet Take 2 tablets by mouth daily as needed  Yes Historical Provider, MD   nitroGLYCERIN (NITROSTAT) 0.4 MG SL tablet Place 0.4 mg under the tongue every 5 minutes as needed.  Yes SURGICAL HISTORY Right 06/06/2000    Laser treatment of \"secondary cataract\".  OTHER SURGICAL HISTORY  10/07/2008    capsule endoscopy    PARTIAL HYSTERECTOMY  1955    Left ovary, tube and uterus removed.  SALPINGO-OOPHORECTOMY  1951    Right ovary and tube removed.  SIGMOIDECTOMY      laparoscopic    SMALL INTESTINE SURGERY  12/05/2008    And lysis of adhesions for diverticular disease. Doravis Teresa Flores    UPPER GASTROINTESTINAL ENDOSCOPY  01/17/2011    Mild gastritis.  UPPER GASTROINTESTINAL ENDOSCOPY  10/22/2012    Irritation at the end of the esophagus.  UPPER GASTROINTESTINAL ENDOSCOPY  11/15/2004    Normal.     Family History   Problem Relation Age of Onset    Diabetes Mother     Heart Disease Mother     Stroke Mother     Kidney Disease Mother     Other Mother         Bowel problems.  Alcohol Abuse Father     Heart Disease Other     Stroke Other     Diabetes Other     Mult Sclerosis Other     Diabetes Other     High Blood Pressure Other     High Cholesterol Other     Glaucoma Neg Hx     Cataracts Neg Hx        CareTeam (Including outside providers/suppliers regularly involved in providing care):   Patient Care Team:  Kirit Angeles DO as PCP - General (Internal Medicine)  Kirit Angeles DO as PCP - S Attributed Provider  Fredy Florez MD as Surgeon (Ophthalmology)    Wt Readings from Last 3 Encounters:   09/04/18 92 lb (41.7 kg)   09/04/18 91 lb (41.3 kg)   07/26/18 91 lb (41.3 kg)     Vitals:    09/04/18 1128   BP: 110/60   Pulse: 74   Temp: 97.5 °F (36.4 °C)   TempSrc: Tympanic   Weight: 92 lb (41.7 kg)     Body mass index is 16.83 kg/m². Patient's complete Health Risk Assessment and screening values have been reviewed and are found in Flowsheets.  The following problems were reviewed today and where indicated follow up appointments were made and/or referrals ordered. Positive Risk Factor Screenings with Interventions:     Fall Risk:  Timed Up and Go Test > 12 seconds?: (!) yes  2 or more falls in past year?: (!) yes  Fall with injury in past year?: no  Fall Risk Assessment[de-identified] (!) Positive Screening for Falls  Fall Risk Interventions:    · Home safety tips provided    Substance Abuse:  Social History     Tobacco History     Smoking Status  Current Every Day Smoker Smoking Frequency  2 packs/day for 55 years (110 pk yrs) Smoking Tobacco Type  Cigarettes    Smokeless Tobacco Use  Never Used          Alcohol History     Alcohol Use Status  No          Drug Use     Drug Use Status  No          Sexual Activity     Sexually Active  Not Asked               Audit Questionnaire: Screen for Alcohol Misuse  How often do you have a drink containing alcohol?: Never  Substance Abuse Interventions:  · None indicated    General Health:  General  In general, how would you say your health is?: (!) Poor  In the past 7 days, have you experienced any of the following?: (!) New or Increased Pain, New or Increased Fatigue  Do you get the social and emotional support that you need?: Yes  Do you have a Living Will?: Yes  General Health Risk Interventions:  · Patient continues to follow with ortho s/p humerus fracture    Health Habits/Nutrition:  Health Habits/Nutrition  Do you exercise for at least 20 minutes 2-3 times per week?: (!) No  Have you lost any weight without trying in the past 3 months?: (!) Yes  Do you eat fewer than 2 meals per day?: (!) Yes  Have you seen a dentist within the past year?: Yes  Body mass index is 16.83 kg/m².   Health Habits/Nutrition Interventions:  · Nutritional issues:  patient is not ready to address his/her nutritional/weight issues at this time, Discussed multiple interventions, patient declines at this time    Hearing/Vision:  Hearing/Vision  Do you or your family notice any trouble with your hearing?: (!) Yes  Do you have difficulty driving, watching TV, or doing any of your daily activities because of your eyesight?: (!) Yes  Have you had an eye exam within the past year?: Yes  Hearing/Vision Interventions:  · Hearing concerns:  audiology referral provided    ADL:  ADLs  In the past 7 days, did you need help from others to perform any of the following everyday activities?: (!) Eating, Grooming, Bathing  In the past 7 days, did you need help from others to take care of any of the following?: [employer], Housekeeping, [employer], Shopping, [employer]. [employer], Food Preparation, Transportation, Taking Medications  ADL Interventions:  · Discussed home health services    Personalized Preventive Plan   Current Health Maintenance Status  Immunization History   Administered Date(s) Administered    DTaP 03/23/1998    Influenza Virus Vaccine 10/12/2004, 10/13/2005, 10/19/2006    Pneumococcal 13-valent Conjugate ([id number]) 11/12/2009    Pneumococcal Polysaccharide ([id number]) 06/21/2002        Health Maintenance   Topic Date Due    Shingles Vaccine (1 of 2 - 2 Dose Series) 03/16/1977    Flu vaccine (1) 09/01/2018    DTaP/Tdap/Td vaccine (2 - Tdap) 01/04/2037 (Originally 3/23/2008)    TSH testing  05/18/2019    Pneumococcal low/med risk  Completed     Recommendations for Preventive Services Due: see orders and patient instructions/AVS.  .   Recommended screening schedule for the next 5-10 years is provided to the patient in written form: see Patient Instructions/AVS.

## 2018-09-04 NOTE — PATIENT INSTRUCTIONS
of the pavement, exposed tree roots, and debris such as fallen leaves or branches. Where can you learn more? Go to https://chpepiceweb.[email or web address]. org and sign in to your aiHit account. Enter P148 in the [facility] box to learn more about \"Preventing Outdoor Falls: Care Instructions. \"     If you do not have an account, please click on the \"Sign Up Now\" link. Current as of: May 12, 2017  Content Version: 11.7  © 6815-4146 [employer]. Care instructions adapted under license by [facility] ([facility]). If you have questions about a medical condition or this instruction, always ask your healthcare professional. [address] any warranty or liability for your use of this information.

## 2018-10-01 ENCOUNTER — OFFICE VISIT (OUTPATIENT)
Dept: OTOLARYNGOLOGY | Age: 83
End: 2018-10-01
Payer: MEDICARE

## 2018-10-01 VITALS
WEIGHT: 94 LBS | DIASTOLIC BLOOD PRESSURE: 70 MMHG | SYSTOLIC BLOOD PRESSURE: 110 MMHG | HEART RATE: 82 BPM | BODY MASS INDEX: 17.3 KG/M2 | HEIGHT: 62 IN

## 2018-10-01 DIAGNOSIS — H61.23 CERUMEN DEBRIS ON TYMPANIC MEMBRANE OF BOTH EARS: Primary | ICD-10-CM

## 2018-10-01 DIAGNOSIS — G89.29 OTHER CHRONIC PAIN: ICD-10-CM

## 2018-10-01 DIAGNOSIS — H91.90 HEARING LOSS, UNSPECIFIED HEARING LOSS TYPE, UNSPECIFIED LATERALITY: ICD-10-CM

## 2018-10-01 PROCEDURE — G8419 CALC BMI OUT NRM PARAM NOF/U: HCPCS | Performed by: OTOLARYNGOLOGY

## 2018-10-01 PROCEDURE — G8427 DOCREV CUR MEDS BY ELIG CLIN: HCPCS | Performed by: OTOLARYNGOLOGY

## 2018-10-01 PROCEDURE — 3288F FALL RISK ASSESSMENT DOCD: CPT | Performed by: OTOLARYNGOLOGY

## 2018-10-01 PROCEDURE — 1090F PRES/ABSN URINE INCON ASSESS: CPT | Performed by: OTOLARYNGOLOGY

## 2018-10-01 PROCEDURE — 4040F PNEUMOC VAC/ADMIN/RCVD: CPT | Performed by: OTOLARYNGOLOGY

## 2018-10-01 PROCEDURE — 1123F ACP DISCUSS/DSCN MKR DOCD: CPT | Performed by: OTOLARYNGOLOGY

## 2018-10-01 PROCEDURE — G8599 NO ASA/ANTIPLAT THER USE RNG: HCPCS | Performed by: OTOLARYNGOLOGY

## 2018-10-01 PROCEDURE — 99203 OFFICE O/P NEW LOW 30 MIN: CPT | Performed by: OTOLARYNGOLOGY

## 2018-10-01 PROCEDURE — G8484 FLU IMMUNIZE NO ADMIN: HCPCS | Performed by: OTOLARYNGOLOGY

## 2018-10-01 PROCEDURE — 1100F PTFALLS ASSESS-DOCD GE2>/YR: CPT | Performed by: OTOLARYNGOLOGY

## 2018-10-01 PROCEDURE — 4004F PT TOBACCO SCREEN RCVD TLK: CPT | Performed by: OTOLARYNGOLOGY

## 2018-10-01 RX ORDER — HYDROCODONE BITARTRATE AND ACETAMINOPHEN 5; 325 MG/1; MG/1
1 TABLET ORAL NIGHTLY PRN
Qty: 30 TABLET | Refills: 0 | Status: SHIPPED | OUTPATIENT
Start: 2018-10-01 | End: 2018-11-01 | Stop reason: SDUPTHER

## 2018-10-01 NOTE — TELEPHONE ENCOUNTER
Controlled Substances Monitoring:     RX Monitoring 10/1/2018   Attestation The Prescription Monitoring Report for this patient was reviewed today. Documentation Obtaining appropriate analgesic effect of treatment. ;No signs of potential drug abuse or diversion identified.    Acute Pain Prescriptions -

## 2018-11-01 DIAGNOSIS — G89.29 OTHER CHRONIC PAIN: ICD-10-CM

## 2018-11-01 DIAGNOSIS — F41.9 ANXIETY: ICD-10-CM

## 2018-11-01 DIAGNOSIS — S42.201S CLOSED FRACTURE OF PROXIMAL END OF RIGHT HUMERUS, UNSPECIFIED FRACTURE MORPHOLOGY, SEQUELA: Primary | ICD-10-CM

## 2018-11-01 RX ORDER — HYDROCODONE BITARTRATE AND ACETAMINOPHEN 5; 325 MG/1; MG/1
1 TABLET ORAL NIGHTLY PRN
Qty: 30 TABLET | Refills: 0 | Status: SHIPPED | OUTPATIENT
Start: 2018-11-01 | End: 2018-12-06 | Stop reason: SDUPTHER

## 2018-11-01 RX ORDER — SERTRALINE HYDROCHLORIDE 25 MG/1
12.5 TABLET, FILM COATED ORAL DAILY
Qty: 15 TABLET | Refills: 11 | Status: SHIPPED | OUTPATIENT
Start: 2018-11-01 | End: 2019-03-15 | Stop reason: SDUPTHER

## 2018-11-06 ENCOUNTER — HOSPITAL ENCOUNTER (OUTPATIENT)
Dept: GENERAL RADIOLOGY | Age: 83
Discharge: HOME | End: 2018-11-08
Payer: MEDICARE

## 2018-11-06 ENCOUNTER — OFFICE VISIT (OUTPATIENT)
Dept: ORTHOPEDIC SURGERY | Age: 83
End: 2018-11-06

## 2018-11-06 VITALS
WEIGHT: 94 LBS | BODY MASS INDEX: 17.3 KG/M2 | HEART RATE: 66 BPM | DIASTOLIC BLOOD PRESSURE: 78 MMHG | SYSTOLIC BLOOD PRESSURE: 122 MMHG | HEIGHT: 62 IN

## 2018-11-06 DIAGNOSIS — S42.291D OTHER CLOSED DISPLACED FRACTURE OF PROXIMAL END OF RIGHT HUMERUS WITH ROUTINE HEALING, SUBSEQUENT ENCOUNTER: Primary | ICD-10-CM

## 2018-11-06 DIAGNOSIS — S42.201S CLOSED FRACTURE OF PROXIMAL END OF RIGHT HUMERUS, UNSPECIFIED FRACTURE MORPHOLOGY, SEQUELA: ICD-10-CM

## 2018-11-06 PROCEDURE — 73060 X-RAY EXAM OF HUMERUS: CPT

## 2018-11-06 PROCEDURE — 99024 POSTOP FOLLOW-UP VISIT: CPT | Performed by: PHYSICIAN ASSISTANT

## 2018-12-06 DIAGNOSIS — G89.29 OTHER CHRONIC PAIN: ICD-10-CM

## 2018-12-06 DIAGNOSIS — F41.9 ANXIETY: ICD-10-CM

## 2018-12-06 RX ORDER — HYDROCODONE BITARTRATE AND ACETAMINOPHEN 5; 325 MG/1; MG/1
1 TABLET ORAL NIGHTLY PRN
Qty: 30 TABLET | Refills: 0 | Status: SHIPPED | OUTPATIENT
Start: 2018-12-06 | End: 2019-01-15 | Stop reason: SDUPTHER

## 2018-12-06 RX ORDER — LORAZEPAM 1 MG/1
1 TABLET ORAL EVERY 6 HOURS PRN
Qty: 120 TABLET | Refills: 0 | Status: SHIPPED | OUTPATIENT
Start: 2018-12-06 | End: 2019-01-02 | Stop reason: SDUPTHER

## 2018-12-18 ENCOUNTER — HOSPITAL ENCOUNTER (OUTPATIENT)
Dept: LAB | Age: 83
Discharge: HOME | End: 2018-12-18
Payer: MEDICARE

## 2018-12-18 DIAGNOSIS — D50.8 IRON DEFICIENCY ANEMIA SECONDARY TO INADEQUATE DIETARY IRON INTAKE: ICD-10-CM

## 2018-12-18 DIAGNOSIS — R73.01 ELEVATED FASTING GLUCOSE: ICD-10-CM

## 2018-12-18 LAB
ANION GAP SERPL CALCULATED.3IONS-SCNC: 13 MMOL/L (ref 9–17)
BUN BLDV-MCNC: 18 MG/DL (ref 8–23)
BUN/CREAT BLD: 24 (ref 9–20)
CALCIUM SERPL-MCNC: 9.7 MG/DL (ref 8.6–10.4)
CHLORIDE BLD-SCNC: 98 MMOL/L (ref 98–107)
CO2: 26 MMOL/L (ref 20–31)
CREAT SERPL-MCNC: 0.74 MG/DL (ref 0.5–0.9)
ESTIMATED AVERAGE GLUCOSE: 97 MG/DL
FERRITIN: 19 UG/L (ref 13–150)
GFR AFRICAN AMERICAN: >60 ML/MIN
GFR NON-AFRICAN AMERICAN: >60 ML/MIN
GFR SERPL CREATININE-BSD FRML MDRD: ABNORMAL ML/MIN/{1.73_M2}
GFR SERPL CREATININE-BSD FRML MDRD: ABNORMAL ML/MIN/{1.73_M2}
GLUCOSE BLD-MCNC: 112 MG/DL (ref 70–99)
HBA1C MFR BLD: 5 % (ref 4.8–5.9)
HCT VFR BLD CALC: 32.8 % (ref 36–46)
HEMOGLOBIN: 10.1 G/DL (ref 12–16)
IRON SATURATION: 5 % (ref 20–55)
IRON: 22 UG/DL (ref 37–145)
MCH RBC QN AUTO: 26 PG (ref 26–34)
MCHC RBC AUTO-ENTMCNC: 31 G/DL (ref 31–37)
MCV RBC AUTO: 84.1 FL (ref 80–100)
NRBC AUTOMATED: ABNORMAL PER 100 WBC
PDW BLD-RTO: 16.2 % (ref 11–14.5)
PLATELET # BLD: 464 K/UL (ref 140–450)
PMV BLD AUTO: 7.8 FL (ref 6–12)
POTASSIUM SERPL-SCNC: 3.8 MMOL/L (ref 3.7–5.3)
RBC # BLD: 3.9 M/UL (ref 4–5.2)
SODIUM BLD-SCNC: 137 MMOL/L (ref 135–144)
TOTAL IRON BINDING CAPACITY: 436 UG/DL (ref 250–450)
UNSATURATED IRON BINDING CAPACITY: 414 UG/DL (ref 112–347)
WBC # BLD: 6.9 K/UL (ref 3.5–11)

## 2018-12-18 PROCEDURE — 83036 HEMOGLOBIN GLYCOSYLATED A1C: CPT

## 2018-12-18 PROCEDURE — 80048 BASIC METABOLIC PNL TOTAL CA: CPT

## 2018-12-18 PROCEDURE — 36415 COLL VENOUS BLD VENIPUNCTURE: CPT

## 2018-12-18 PROCEDURE — 85027 COMPLETE CBC AUTOMATED: CPT

## 2018-12-18 PROCEDURE — 83550 IRON BINDING TEST: CPT

## 2018-12-18 PROCEDURE — 83540 ASSAY OF IRON: CPT

## 2018-12-18 PROCEDURE — 82728 ASSAY OF FERRITIN: CPT

## 2018-12-18 NOTE — PROGRESS NOTES
Patient daughter notified of results by phone. Patient takes the iron pills once in a while.   And no blood in stool

## 2018-12-21 ENCOUNTER — OFFICE VISIT (OUTPATIENT)
Dept: INTERNAL MEDICINE | Age: 83
End: 2018-12-21
Payer: MEDICARE

## 2018-12-21 VITALS
SYSTOLIC BLOOD PRESSURE: 118 MMHG | HEIGHT: 62 IN | HEART RATE: 64 BPM | TEMPERATURE: 96.6 F | BODY MASS INDEX: 17.15 KG/M2 | OXYGEN SATURATION: 99 % | DIASTOLIC BLOOD PRESSURE: 70 MMHG | WEIGHT: 93.2 LBS

## 2018-12-21 DIAGNOSIS — M15.9 PRIMARY OSTEOARTHRITIS INVOLVING MULTIPLE JOINTS: ICD-10-CM

## 2018-12-21 DIAGNOSIS — J43.8 OTHER EMPHYSEMA (HCC): ICD-10-CM

## 2018-12-21 DIAGNOSIS — I25.10 CORONARY ARTERY DISEASE INVOLVING NATIVE CORONARY ARTERY OF NATIVE HEART, ANGINA PRESENCE UNSPECIFIED: ICD-10-CM

## 2018-12-21 DIAGNOSIS — Z72.0 TOBACCO ABUSE: ICD-10-CM

## 2018-12-21 DIAGNOSIS — F41.9 ANXIETY: ICD-10-CM

## 2018-12-21 DIAGNOSIS — E03.8 OTHER SPECIFIED HYPOTHYROIDISM: ICD-10-CM

## 2018-12-21 DIAGNOSIS — K21.9 GASTROESOPHAGEAL REFLUX DISEASE WITHOUT ESOPHAGITIS: ICD-10-CM

## 2018-12-21 DIAGNOSIS — J32.0 CHRONIC MAXILLARY SINUSITIS: ICD-10-CM

## 2018-12-21 DIAGNOSIS — E78.2 MIXED HYPERLIPIDEMIA: ICD-10-CM

## 2018-12-21 DIAGNOSIS — F32.4 MAJOR DEPRESSIVE DISORDER WITH SINGLE EPISODE, IN PARTIAL REMISSION (HCC): ICD-10-CM

## 2018-12-21 DIAGNOSIS — D50.8 IRON DEFICIENCY ANEMIA SECONDARY TO INADEQUATE DIETARY IRON INTAKE: ICD-10-CM

## 2018-12-21 DIAGNOSIS — R73.01 ELEVATED FASTING GLUCOSE: Primary | ICD-10-CM

## 2018-12-21 PROBLEM — S51.001D ELBOW WOUND, RIGHT, SUBSEQUENT ENCOUNTER: Status: RESOLVED | Noted: 2018-05-18 | Resolved: 2018-12-21

## 2018-12-21 PROBLEM — S42.201D CLOSED FRACTURE OF PROXIMAL END OF RIGHT HUMERUS WITH ROUTINE HEALING: Status: RESOLVED | Noted: 2018-02-20 | Resolved: 2018-12-21

## 2018-12-21 PROCEDURE — 3023F SPIROM DOC REV: CPT | Performed by: INTERNAL MEDICINE

## 2018-12-21 PROCEDURE — 1100F PTFALLS ASSESS-DOCD GE2>/YR: CPT | Performed by: INTERNAL MEDICINE

## 2018-12-21 PROCEDURE — 1090F PRES/ABSN URINE INCON ASSESS: CPT | Performed by: INTERNAL MEDICINE

## 2018-12-21 PROCEDURE — 3288F FALL RISK ASSESSMENT DOCD: CPT | Performed by: INTERNAL MEDICINE

## 2018-12-21 PROCEDURE — 1123F ACP DISCUSS/DSCN MKR DOCD: CPT | Performed by: INTERNAL MEDICINE

## 2018-12-21 PROCEDURE — G8926 SPIRO NO PERF OR DOC: HCPCS | Performed by: INTERNAL MEDICINE

## 2018-12-21 PROCEDURE — 4040F PNEUMOC VAC/ADMIN/RCVD: CPT | Performed by: INTERNAL MEDICINE

## 2018-12-21 PROCEDURE — 4004F PT TOBACCO SCREEN RCVD TLK: CPT | Performed by: INTERNAL MEDICINE

## 2018-12-21 PROCEDURE — G8419 CALC BMI OUT NRM PARAM NOF/U: HCPCS | Performed by: INTERNAL MEDICINE

## 2018-12-21 PROCEDURE — G8427 DOCREV CUR MEDS BY ELIG CLIN: HCPCS | Performed by: INTERNAL MEDICINE

## 2018-12-21 PROCEDURE — G8484 FLU IMMUNIZE NO ADMIN: HCPCS | Performed by: INTERNAL MEDICINE

## 2018-12-21 PROCEDURE — G8599 NO ASA/ANTIPLAT THER USE RNG: HCPCS | Performed by: INTERNAL MEDICINE

## 2018-12-21 PROCEDURE — 99214 OFFICE O/P EST MOD 30 MIN: CPT | Performed by: INTERNAL MEDICINE

## 2018-12-21 PROCEDURE — G8510 SCR DEP NEG, NO PLAN REQD: HCPCS | Performed by: INTERNAL MEDICINE

## 2018-12-21 RX ORDER — NITROGLYCERIN 0.4 MG/1
0.4 TABLET SUBLINGUAL EVERY 5 MIN PRN
Qty: 25 TABLET | Refills: 3 | Status: SHIPPED | OUTPATIENT
Start: 2018-12-21 | End: 2019-10-04 | Stop reason: SDUPTHER

## 2018-12-21 RX ORDER — FERROUS SULFATE 325(65) MG
325 TABLET ORAL 2 TIMES DAILY
Qty: 60 TABLET | Refills: 11 | Status: SHIPPED | OUTPATIENT
Start: 2018-12-21 | End: 2019-04-03 | Stop reason: ALTCHOICE

## 2018-12-21 ASSESSMENT — PATIENT HEALTH QUESTIONNAIRE - PHQ9
SUM OF ALL RESPONSES TO PHQ QUESTIONS 1-9: 2
SUM OF ALL RESPONSES TO PHQ QUESTIONS 1-9: 2
2. FEELING DOWN, DEPRESSED OR HOPELESS: 1
1. LITTLE INTEREST OR PLEASURE IN DOING THINGS: 1
SUM OF ALL RESPONSES TO PHQ9 QUESTIONS 1 & 2: 2

## 2018-12-21 NOTE — PROGRESS NOTES
Depression Severity: 5-9 = Mild depression, 10-14 = Moderate depression, 15-19 = Moderately severe depression, 20-27 = Severe depression

## 2018-12-21 NOTE — PATIENT INSTRUCTIONS
tablespoon of peanut butter, ½ ounce nuts or seeds, or ¼ cup of cooked beans equals 1 ounce of meat. · Learn how to read food labels for serving sizes and ingredients. Fast-food and convenience-food meals often contain few or no fruits or vegetables. Make sure you eat some fruits and vegetables to make the meal more nutritious. · Look at your food diary. For each food group, add up what you have eaten and then divide the total by the number of days. This will give you an idea of how much you are eating from each food group. See if you can find some ways to change your diet to make it more healthy. Start small  · Do not try to make dramatic changes to your diet all at once. You might feel that you are missing out on your favorite foods and then be more likely to fail. · Start slowly, and gradually change your habits. Try some of the following:  ? Use whole wheat bread instead of white bread. ? Use nonfat or low-fat milk instead of whole milk. ? Eat brown rice instead of white rice, and eat whole wheat pasta instead of white-flour pasta. ? Try low-fat cheeses and low-fat yogurt. ? Add more fruits and vegetables to meals and have them for snacks. ? Add lettuce, tomato, cucumber, and onion to sandwiches. ? Add fruit to yogurt and cereal.  Enjoy food  · You can still eat your favorite foods. You just may need to eat less of them. If your favorite foods are high in fat, salt, and sugar, limit how often you eat them, but do not cut them out entirely. · Eat a wide variety of foods. Make healthy choices when eating out  · The type of restaurant you choose can help you make healthy choices. Even fast-food chains are now offering more low-fat or healthier choices on the menu. · Choose smaller portions, or take half of your meal home. · When eating out, try:  ? A veggie pizza with a whole wheat crust or grilled chicken (instead of sausage or pepperoni).   ? Pasta with roasted vegetables, grilled chicken, or marinara sauce instead of cream sauce. ? A vegetable wrap or grilled chicken wrap. ? Broiled or poached food instead of fried or breaded items. Make healthy choices easy  · Buy packaged, prewashed, ready-to-eat fresh vegetables and fruits, such as baby carrots, salad mixes, and chopped or shredded broccoli and cauliflower. · Buy packaged, presliced fruits, such as melon or pineapple. · Choose 100% fruit or vegetable juice instead of soda. Limit juice intake to 4 to 6 oz (½ to ¾ cup) a day. · Blend low-fat yogurt, fruit juice, and canned or frozen fruit to make a smoothie for breakfast or a snack. Where can you learn more? Go to https://NephroPlusdedrickeb.[employer]. org and sign in to your [employer] account. Enter R225 in the [employer] box to learn more about \"Eating Healthy Foods: Care Instructions. \"     If you do not have an account, please click on the \"Sign Up Now\" link. Current as of: March 29, 2018  Content Version: 11.8  © 0559-2159 [employer]. Care instructions adapted under license by [facility] [place] ([facility]). If you have questions about a medical condition or this instruction, always ask your healthcare professional. [address] any warranty or liability for your use of this information. Patient Education        Walking for Exercise: Care Instructions  Your Care Instructions    Walking is one of the easiest ways to get the exercise you need for good health. A brisk, 30-minute walk each day can help you feel better and have more energy. It can help you lower your risk of disease. Walking can help you keep your bones strong and your heart healthy. Check with your doctor before you start a walking plan if you have heart problems, other health issues, or you have not been active in a long time. Follow your doctor's instructions for safe levels of exercise. Follow-up care is a key part of your treatment and safety.  Be sure to make and go to all appointments, and call your doctor if you are having problems. It's also a good idea to know your test results and keep a list of the medicines you take. How can you care for yourself at home? Getting started  · Start slowly and set a short-term goal. For example, walk for 5 or 10 minutes every day. · Bit by bit, increase the amount you walk every day. Try for at least 30 minutes on most days of the week. You also may want to swim, bike, or do other activities. · If finding enough time is a problem, it is fine to be active in blocks of 10 minutes or more throughout your day and week. · To get the heart-healthy benefits of walking, you need to walk briskly enough to increase your heart rate and breathing, but not so fast that you cannot talk comfortably. · Wear comfortable shoes that fit well and provide good support for your feet and ankles. Staying with your plan  · After you've made walking a habit, set a longer-term goal. You may want to set a goal of walking briskly for longer or walking farther. Experts say to do 2½ hours of moderate activity a week. A faster heartbeat is what defines moderate-level activity. · To stay motivated, walk with friends, coworkers, or pets. · Use a phone guilherme or pedometer to track your steps each day. Set a goal to increase your steps. Once you get there, set a higher goal. Aim for 10,000 steps a day. · If the weather keeps you from walking outside, go for walks at the mall with a friend. Local schools and churches may have indoor gyms where you can walk. Fitting a walk into your workday  · Park several blocks away from work, or get off the bus a few stops early. · Use the stairs instead of the elevator, at least for a few floors. · Suggest holding meetings with colleagues during a walk inside or outside the building. · Use the restroom that is the farthest from your desk or workstation. · Use your morning and afternoon breaks to take quick 15-minute walks.   Staying

## 2018-12-21 NOTE — PROGRESS NOTES
 Quinolones Hives    Statins [Statins]      Leg cramps      Tape Brandy Shorten Tape]     Tramadol      \"makes her walk into walls\"    Valium     Vicodin [Hydrocodone-Acetaminophen]     Zithromax [Azithromycin]      Nausea      Hydrocodone Nausea Only       MEDICATIONS:   Outpatient Prescriptions Marked as Taking for the 12/21/18 encounter (Office Visit) with Cheryl Butt, DO   Medication Sig Dispense Refill    HYDROcodone-acetaminophen (NORCO) 5-325 MG per tablet Take 1 tablet by mouth nightly as needed for Pain for up to 30 days. John Ward Date: 12/6/18 30 tablet 0    LORazepam (ATIVAN) 1 MG tablet Take 1 tablet by mouth every 6 hours as needed for Anxiety for up to 30 days. . 120 tablet 0    sertraline (ZOLOFT) 25 MG tablet Take 0.5 tablets by mouth daily 15 tablet 11    cetirizine (ZYRTEC) 10 MG tablet Take 10 mg by mouth daily      ferrous sulfate 325 (65 Fe) MG tablet Take 325 mg by mouth daily (with breakfast)      Multiple Minerals-Vitamins (CALCIUM-MAGNESIUM-ZINC-D3) TABS Take 1 tablet by mouth daily      Fiber POWD Take by mouth nightly      Multiple Vitamins-Minerals (I-DANIA) TABS Take 1 tablet by mouth 2 times daily      Cyanocobalamin (B-12) 1000 MCG CAPS Take 1 tablet by mouth daily      miconazole (NEOSPORIN AF) 2 % cream Apply topically 2 times daily Apply topically 2 times daily.  Propylene Glycol (SYSTANE BALANCE OP) Apply 1 drop to eye 3 times daily       albuterol (PROVENTIL HFA) 108 (90 BASE) MCG/ACT inhaler Inhale 2 puffs into the lungs every 6 hours as needed for Wheezing or Shortness of Breath. 1 Inhaler 3    acetaminophen (TYLENOL) 500 MG tablet Take 500 mg by mouth every 6 hours as needed for Pain.  Probiotic Product (PROBIOTIC DAILY PO) Take 1 tablet by mouth daily TruBiotics      Multiple Vitamins-Minerals (MULTIVITAMIN & MINERAL PO) Take 1 tablet by mouth daily.  Cholecalciferol (VITAMIN D3) 1000 UNITS TABS Take 1 tablet by mouth daily. acute distress. Skin: Skin color, texture, turgor normal. No rashes or lesions. HEENT/Neck: Head: Normal, normocephalic, atraumatic. Eye: Normal external eye, conjunctiva, lids cornea, BLAIR. Ears: Normal TM's bilaterally. Normal auditory canals and external ears. Non-tender. Nose: Normal external nose and septum. Mucosa erythematous with clear discharge. Pharynx: Dental Hygiene adequate. Normal buccal mucosa. Normal pharynx. There was posterior nasal drainage. Neck / Thyroid: Supple, no masses, nodes, nodules or enlargement. Lungs: Normal - CTA without rales, rhonchi, or wheezing. Heart: regular rate and rhythm, S1, S2 normal, no murmur, click, rub or gallop No S3 or S4. Abdomen: Non-obese, soft, non-distended, non-tender, normal active bowel sounds, no masses palpated and no hepatosplenomegaly  Extremities: There is no clubbing, cyanosis, or edema in any of the extremities. Neurologic:  cranial nerves II-XII are grossly intact  Osteopathic Structural Examination: Patient was examined in the seated and standing positions. There was full range of motion in the cervical, thoracic, and lumbar spines. No scoliosis. No change in thoracic kyphosis or lumbar lordosis. No increased paravertebral muscle tenderness. ASSESSMENT/PLAN:    1. Elevated fasting glucose, stable  - We reviewed her fasting glucose and Hemoglobin A1c. Results showed stable control.  - She will continue to watch her diet and exercise to keep her elevated fasting glucose under control.   - We will continue to monitor for the development of diabetes. - Hemoglobin A1C; Future  - Basic Metabolic Panel; Future    2. Mixed hyperlipidemia, controlled  - She will continue to watch her diet and exercise    3. Other specified hypothyroidism, stable  - She will continue to take Synthroid    4. Chronic maxillary sinusitis, worse  - She saw Dr. Katerine Rivera for impacted cerumen but she does not have any cerumen impaction at this time.  We will refer her Order Specific Question:   No of Hours? Answer:   na    Ferritin     Standing Status:   Future     Standing Expiration Date:   12/21/2019   Donita Bustos MD, ENT, [place]     Referral Priority:   Routine     Referral Type:   Eval and Treat     Referral Reason:   Specialty Services Required     Referred to Provider:   Lloyd Samson MD     Requested Specialty:   Otolaryngology     Number of Visits Requested:   1       Requested Prescriptions     Signed Prescriptions Disp Refills    nitroGLYCERIN (NITROSTAT) 0.4 MG SL tablet 25 tablet 3     Sig: Place 1 tablet under the tongue every 5 minutes as needed for Chest pain    ferrous sulfate 325 (65 Fe) MG tablet 60 tablet 11     Sig: Take 1 tablet by mouth 2 times daily       Labs and medications as ordered above. Return in about 3 months (around 3/21/2019).         Electronically signed by Yuan Turner DO on 12/21/2018 at 1:37 PM  Internal Medicine

## 2019-01-02 DIAGNOSIS — F41.9 ANXIETY: ICD-10-CM

## 2019-01-02 RX ORDER — LORAZEPAM 1 MG/1
1 TABLET ORAL EVERY 6 HOURS PRN
Qty: 120 TABLET | Refills: 0 | OUTPATIENT
Start: 2019-01-02 | End: 2019-03-15 | Stop reason: SDUPTHER

## 2019-01-15 DIAGNOSIS — G89.29 OTHER CHRONIC PAIN: ICD-10-CM

## 2019-01-16 RX ORDER — HYDROCODONE BITARTRATE AND ACETAMINOPHEN 5; 325 MG/1; MG/1
1 TABLET ORAL NIGHTLY PRN
Qty: 30 TABLET | Refills: 0 | Status: SHIPPED | OUTPATIENT
Start: 2019-01-16 | End: 2019-02-15

## 2019-03-15 DIAGNOSIS — F41.9 ANXIETY: ICD-10-CM

## 2019-03-15 RX ORDER — LORAZEPAM 1 MG/1
1 TABLET ORAL EVERY 6 HOURS PRN
Qty: 120 TABLET | Refills: 0 | Status: SHIPPED | OUTPATIENT
Start: 2019-03-15 | End: 2019-04-11 | Stop reason: SDUPTHER

## 2019-03-15 RX ORDER — SERTRALINE HYDROCHLORIDE 25 MG/1
12.5 TABLET, FILM COATED ORAL DAILY
Qty: 15 TABLET | Refills: 11 | Status: SHIPPED | OUTPATIENT
Start: 2019-03-15 | End: 2020-04-25 | Stop reason: SDUPTHER

## 2019-04-02 ENCOUNTER — OFFICE VISIT (OUTPATIENT)
Dept: OTOLARYNGOLOGY | Age: 84
End: 2019-04-02
Payer: MEDICARE

## 2019-04-02 ENCOUNTER — HOSPITAL ENCOUNTER (OUTPATIENT)
Dept: LAB | Age: 84
Discharge: HOME | End: 2019-04-02
Payer: MEDICARE

## 2019-04-02 VITALS
BODY MASS INDEX: 15.92 KG/M2 | WEIGHT: 93.25 LBS | DIASTOLIC BLOOD PRESSURE: 62 MMHG | HEART RATE: 90 BPM | SYSTOLIC BLOOD PRESSURE: 119 MMHG | HEIGHT: 64 IN

## 2019-04-02 DIAGNOSIS — D50.8 IRON DEFICIENCY ANEMIA SECONDARY TO INADEQUATE DIETARY IRON INTAKE: ICD-10-CM

## 2019-04-02 DIAGNOSIS — H61.23 CERUMEN DEBRIS ON TYMPANIC MEMBRANE OF BOTH EARS: Primary | ICD-10-CM

## 2019-04-02 DIAGNOSIS — R73.01 ELEVATED FASTING GLUCOSE: ICD-10-CM

## 2019-04-02 DIAGNOSIS — H91.90 HEARING LOSS, UNSPECIFIED HEARING LOSS TYPE, UNSPECIFIED LATERALITY: ICD-10-CM

## 2019-04-02 LAB
ABSOLUTE EOS #: 0.3 K/UL (ref 0–0.4)
ABSOLUTE IMMATURE GRANULOCYTE: ABNORMAL K/UL (ref 0–0.3)
ABSOLUTE LYMPH #: 1.8 K/UL (ref 1–4.8)
ABSOLUTE MONO #: 1 K/UL (ref 0.1–1.2)
ANION GAP SERPL CALCULATED.3IONS-SCNC: 12 MMOL/L (ref 9–17)
BASOPHILS # BLD: 2 % (ref 0–1)
BASOPHILS ABSOLUTE: 0.1 K/UL (ref 0–0.2)
BUN BLDV-MCNC: 15 MG/DL (ref 8–23)
BUN/CREAT BLD: 21 (ref 9–20)
CALCIUM SERPL-MCNC: 9.7 MG/DL (ref 8.6–10.4)
CHLORIDE BLD-SCNC: 101 MMOL/L (ref 98–107)
CO2: 27 MMOL/L (ref 20–31)
CREAT SERPL-MCNC: 0.7 MG/DL (ref 0.5–0.9)
DIFFERENTIAL TYPE: ABNORMAL
EOSINOPHILS RELATIVE PERCENT: 4 % (ref 1–7)
ESTIMATED AVERAGE GLUCOSE: 97 MG/DL
FERRITIN: 20 UG/L (ref 13–150)
GFR AFRICAN AMERICAN: >60 ML/MIN
GFR NON-AFRICAN AMERICAN: >60 ML/MIN
GFR SERPL CREATININE-BSD FRML MDRD: ABNORMAL ML/MIN/{1.73_M2}
GFR SERPL CREATININE-BSD FRML MDRD: ABNORMAL ML/MIN/{1.73_M2}
GLUCOSE BLD-MCNC: 118 MG/DL (ref 70–99)
HBA1C MFR BLD: 5 % (ref 4.8–5.9)
HCT VFR BLD CALC: 33.6 % (ref 36–46)
HEMOGLOBIN: 10.4 G/DL (ref 12–16)
IMMATURE GRANULOCYTES: ABNORMAL %
IRON SATURATION: 7 % (ref 20–55)
IRON: 29 UG/DL (ref 37–145)
LYMPHOCYTES # BLD: 22 % (ref 16–46)
MCH RBC QN AUTO: 25.4 PG (ref 26–34)
MCHC RBC AUTO-ENTMCNC: 30.8 G/DL (ref 31–37)
MCV RBC AUTO: 82.4 FL (ref 80–100)
MONOCYTES # BLD: 12 % (ref 4–11)
NRBC AUTOMATED: ABNORMAL PER 100 WBC
PDW BLD-RTO: 19.1 % (ref 11–14.5)
PLATELET # BLD: 441 K/UL (ref 140–450)
PLATELET ESTIMATE: ABNORMAL
PMV BLD AUTO: 7.7 FL (ref 6–12)
POTASSIUM SERPL-SCNC: 3.9 MMOL/L (ref 3.7–5.3)
RBC # BLD: 4.08 M/UL (ref 4–5.2)
RBC # BLD: ABNORMAL 10*6/UL
SEG NEUTROPHILS: 60 % (ref 43–77)
SEGMENTED NEUTROPHILS ABSOLUTE COUNT: 5.1 K/UL (ref 1.8–7.7)
SODIUM BLD-SCNC: 140 MMOL/L (ref 135–144)
TOTAL IRON BINDING CAPACITY: 438 UG/DL (ref 250–450)
UNSATURATED IRON BINDING CAPACITY: 409 UG/DL (ref 112–347)
WBC # BLD: 8.3 K/UL (ref 3.5–11)
WBC # BLD: ABNORMAL 10*3/UL

## 2019-04-02 PROCEDURE — 4004F PT TOBACCO SCREEN RCVD TLK: CPT | Performed by: OTOLARYNGOLOGY

## 2019-04-02 PROCEDURE — 83540 ASSAY OF IRON: CPT

## 2019-04-02 PROCEDURE — 3288F FALL RISK ASSESSMENT DOCD: CPT | Performed by: OTOLARYNGOLOGY

## 2019-04-02 PROCEDURE — 80048 BASIC METABOLIC PNL TOTAL CA: CPT

## 2019-04-02 PROCEDURE — 83036 HEMOGLOBIN GLYCOSYLATED A1C: CPT

## 2019-04-02 PROCEDURE — 0518F FALL PLAN OF CARE DOCD: CPT | Performed by: OTOLARYNGOLOGY

## 2019-04-02 PROCEDURE — 1123F ACP DISCUSS/DSCN MKR DOCD: CPT | Performed by: OTOLARYNGOLOGY

## 2019-04-02 PROCEDURE — 83550 IRON BINDING TEST: CPT

## 2019-04-02 PROCEDURE — 1090F PRES/ABSN URINE INCON ASSESS: CPT | Performed by: OTOLARYNGOLOGY

## 2019-04-02 PROCEDURE — 82728 ASSAY OF FERRITIN: CPT

## 2019-04-02 PROCEDURE — 4040F PNEUMOC VAC/ADMIN/RCVD: CPT | Performed by: OTOLARYNGOLOGY

## 2019-04-02 PROCEDURE — 99213 OFFICE O/P EST LOW 20 MIN: CPT | Performed by: OTOLARYNGOLOGY

## 2019-04-02 PROCEDURE — G8419 CALC BMI OUT NRM PARAM NOF/U: HCPCS | Performed by: OTOLARYNGOLOGY

## 2019-04-02 PROCEDURE — G8427 DOCREV CUR MEDS BY ELIG CLIN: HCPCS | Performed by: OTOLARYNGOLOGY

## 2019-04-02 PROCEDURE — G8599 NO ASA/ANTIPLAT THER USE RNG: HCPCS | Performed by: OTOLARYNGOLOGY

## 2019-04-02 PROCEDURE — 36415 COLL VENOUS BLD VENIPUNCTURE: CPT

## 2019-04-02 PROCEDURE — 85025 COMPLETE CBC W/AUTO DIFF WBC: CPT

## 2019-04-02 NOTE — PROGRESS NOTES
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  GERD (gastroesophageal reflux disease)     Hyperlipidemia     Hypothyroidism     Intestinal adhesions     Iron deficiency anemia     Microalbuminuria     Migraines     Non-ST elevation myocardial infarction (NSTEMI) (HCC)     anteroseptal    Osteoarthritis     Osteoporosis     Palpitations     Pseudophakos     Bilateral.    Radiation exposure age 2    nasopharyngeal radiation    Scoliosis     Tobacco abuse         Past Surgical History:   Procedure Laterality Date    ABDOMINAL EXPLORATION SURGERY  1950    with lysis of adhesions    APPENDECTOMY  1937    CATARACT REMOVAL WITH IMPLANT Right 04/25/2000    CATARACT REMOVAL WITH IMPLANT Left 07/18/2000    CHOLECYSTECTOMY, OPEN  1982    COLONOSCOPY  01/17/2011    Good anastomosis from previous diverticular resection, residual diverticulosis.  COLONOSCOPY  09/29/2008    With EGD showing mild gastritis, moderate sigmiod diverticulosis, internal hemorrhoids.  COLONOSCOPY  10/24/2001    With EGD showing mild antral gastritis, mild erythema of the cecum, small internal hemorrhoid. [address]    [place] treatment to both ears.     OTHER SURGICAL HISTORY Right week: Not on file     Minutes per session: Not on file    Stress: Not on file   Relationships    Social connections:     Talks on phone: Not on file     Gets together: Not on file     Attends Jainism service: Not on file     Active member of club or organization: Not on file     Attends meetings of clubs or organizations: Not on file     Relationship status: Not on file    Intimate partner violence:     Fear of current or ex partner: Not on file     Emotionally abused: Not on file     Physically abused: Not on file     Forced sexual activity: Not on file   Other Topics Concern    Not on file   Social History Narrative    Not on file       Current Medications:     Current Outpatient Medications:     sertraline (ZOLOFT) 25 MG tablet, Take 0.5 tablets by mouth daily, Disp: 15 tablet, Rfl: 11    LORazepam (ATIVAN) 1 MG tablet, Take 1 tablet by mouth every 6 hours as needed for Anxiety for up to 30 days. , Disp: 120 tablet, Rfl: 0    nitroGLYCERIN (NITROSTAT) 0.4 MG SL tablet, Place 1 tablet under the tongue every 5 minutes as needed for Chest pain, Disp: 25 tablet, Rfl: 3    ferrous sulfate 325 (65 Fe) MG tablet, Take 1 tablet by mouth 2 times daily, Disp: 60 tablet, Rfl: 11    cetirizine (ZYRTEC) 10 MG tablet, Take 10 mg by mouth daily, Disp: , Rfl:     Multiple Minerals-Vitamins (CALCIUM-MAGNESIUM-ZINC-D3) TABS, Take 1 tablet by mouth daily, Disp: , Rfl:     Fiber POWD, Take by mouth nightly, Disp: , Rfl:     Multiple Vitamins-Minerals (I-DANIA) TABS, Take 1 tablet by mouth 2 times daily, Disp: , Rfl:     Cyanocobalamin (B-12) 1000 MCG CAPS, Take 1 tablet by mouth daily, Disp: , Rfl:     miconazole (NEOSPORIN AF) 2 % cream, Apply topically 2 times daily Apply topically 2 times daily. , Disp: , Rfl:     Propylene Glycol (SYSTANE BALANCE OP), Apply 1 drop to eye 3 times daily , Disp: , Rfl:     albuterol (PROVENTIL HFA) 108 (90 BASE) MCG/ACT inhaler, Inhale 2 puffs into the lungs every 6 hours as needed for Wheezing or Shortness of Breath., Disp: 1 Inhaler, Rfl: 3    acetaminophen (TYLENOL) 500 MG tablet, Take 500 mg by mouth every 6 hours as needed for Pain., Disp: , Rfl:     Probiotic Product (PROBIOTIC DAILY PO), Take 1 tablet by mouth daily TruBiotics, Disp: , Rfl:     Multiple Vitamins-Minerals (MULTIVITAMIN & MINERAL PO), Take 1 tablet by mouth daily. , Disp: , Rfl:     Cholecalciferol (VITAMIN D3) 1000 UNITS TABS, Take 1 tablet by mouth daily. , Disp: , Rfl:     senna (SENOKOT) 8.6 MG tablet, Take 2 tablets by mouth daily as needed , Disp: , Rfl:     docusate sodium (COLACE) 100 MG capsule, Take 100 mg by mouth 2 times daily , Disp: , Rfl:     ascorbic acid (VITAMIN C) 500 MG tablet, Take 1,000 mg by mouth daily. , Disp: , Rfl:     Biotin 5000 MCG CAPS, Take 2 tablets by mouth daily , Disp: , Rfl:      ROS:   CV:    Endocrine:    Resp:  copd   GI:       Neuro:   PE:     General appearance:  Normal                 Ability to Communicate:  Normal       Head & Face:  Normal   Salivary Glands:  Normal              Facial Strength:  Normal   Ears:    Pinna:  Normal            EAC:  Normal cerumen removed w curette AU     TMs:  Normal       [place]earin [place]:  Li [place]in[place]     Finger Rub     Nose:    External: Normal    Septum:  Normal    Turbinates: Normal             Nasal Cavity: Normal         Naso Pharyngoscopy:     Nasal Endoscopy:      Oral Cavity & Oral Pharynx:    Tongue:  Normal    Teeth & Gums:             Palate:  Barbara     Tonsils:      FOM:  Normal     Other:      Neck:    Thyroid:Normal       Lymph nodes: Normal           Trachea:  Normal      Masses:  Normal    Other:        Eyes:    EOMs:      Nystagmus:      Neurological:    CN V:      CN VII:       Gait & Station:      Romberg:      Tandem Gait:      [place]:       Oriented x 3: Normal     Affect:  Normal    Data reviewed:      ASSESSMENT:   Diagnosis Orders   1. Cerumen debris on tympanic membrane of both ears     2.  Hearing loss, unspecified hearing loss type, unspecified laterality         PLAN:  Return in about 6 months (around 10/2/2019). No orders of the defined types were placed in this encounter.         Yolande Arroyo MD

## 2019-04-03 ENCOUNTER — OFFICE VISIT (OUTPATIENT)
Dept: INTERNAL MEDICINE | Age: 84
End: 2019-04-03
Payer: MEDICARE

## 2019-04-03 VITALS
WEIGHT: 96 LBS | RESPIRATION RATE: 16 BRPM | BODY MASS INDEX: 16.39 KG/M2 | HEIGHT: 64 IN | HEART RATE: 76 BPM | DIASTOLIC BLOOD PRESSURE: 60 MMHG | SYSTOLIC BLOOD PRESSURE: 110 MMHG

## 2019-04-03 DIAGNOSIS — E78.2 MIXED HYPERLIPIDEMIA: ICD-10-CM

## 2019-04-03 DIAGNOSIS — D50.8 IRON DEFICIENCY ANEMIA SECONDARY TO INADEQUATE DIETARY IRON INTAKE: ICD-10-CM

## 2019-04-03 DIAGNOSIS — E03.8 OTHER SPECIFIED HYPOTHYROIDISM: ICD-10-CM

## 2019-04-03 DIAGNOSIS — J43.8 OTHER EMPHYSEMA (HCC): ICD-10-CM

## 2019-04-03 DIAGNOSIS — M15.9 PRIMARY OSTEOARTHRITIS INVOLVING MULTIPLE JOINTS: ICD-10-CM

## 2019-04-03 DIAGNOSIS — F32.4 MAJOR DEPRESSIVE DISORDER WITH SINGLE EPISODE, IN PARTIAL REMISSION (HCC): ICD-10-CM

## 2019-04-03 DIAGNOSIS — K21.9 GASTROESOPHAGEAL REFLUX DISEASE WITHOUT ESOPHAGITIS: ICD-10-CM

## 2019-04-03 DIAGNOSIS — F17.210 CIGARETTE SMOKER: ICD-10-CM

## 2019-04-03 DIAGNOSIS — Z91.81 AT HIGH RISK FOR FALLS: ICD-10-CM

## 2019-04-03 DIAGNOSIS — R73.01 ELEVATED FASTING GLUCOSE: Primary | ICD-10-CM

## 2019-04-03 DIAGNOSIS — I25.10 CORONARY ARTERY DISEASE INVOLVING NATIVE CORONARY ARTERY OF NATIVE HEART, ANGINA PRESENCE UNSPECIFIED: ICD-10-CM

## 2019-04-03 DIAGNOSIS — F41.9 ANXIETY: ICD-10-CM

## 2019-04-03 DIAGNOSIS — Z72.0 TOBACCO ABUSE: ICD-10-CM

## 2019-04-03 PROCEDURE — G8599 NO ASA/ANTIPLAT THER USE RNG: HCPCS | Performed by: INTERNAL MEDICINE

## 2019-04-03 PROCEDURE — 3023F SPIROM DOC REV: CPT | Performed by: INTERNAL MEDICINE

## 2019-04-03 PROCEDURE — 3288F FALL RISK ASSESSMENT DOCD: CPT | Performed by: INTERNAL MEDICINE

## 2019-04-03 PROCEDURE — 1090F PRES/ABSN URINE INCON ASSESS: CPT | Performed by: INTERNAL MEDICINE

## 2019-04-03 PROCEDURE — 99406 BEHAV CHNG SMOKING 3-10 MIN: CPT | Performed by: INTERNAL MEDICINE

## 2019-04-03 PROCEDURE — 0518F FALL PLAN OF CARE DOCD: CPT | Performed by: INTERNAL MEDICINE

## 2019-04-03 PROCEDURE — 4004F PT TOBACCO SCREEN RCVD TLK: CPT | Performed by: INTERNAL MEDICINE

## 2019-04-03 PROCEDURE — 1123F ACP DISCUSS/DSCN MKR DOCD: CPT | Performed by: INTERNAL MEDICINE

## 2019-04-03 PROCEDURE — G8427 DOCREV CUR MEDS BY ELIG CLIN: HCPCS | Performed by: INTERNAL MEDICINE

## 2019-04-03 PROCEDURE — 99214 OFFICE O/P EST MOD 30 MIN: CPT | Performed by: INTERNAL MEDICINE

## 2019-04-03 PROCEDURE — G8926 SPIRO NO PERF OR DOC: HCPCS | Performed by: INTERNAL MEDICINE

## 2019-04-03 PROCEDURE — 4040F PNEUMOC VAC/ADMIN/RCVD: CPT | Performed by: INTERNAL MEDICINE

## 2019-04-03 PROCEDURE — G8419 CALC BMI OUT NRM PARAM NOF/U: HCPCS | Performed by: INTERNAL MEDICINE

## 2019-04-03 RX ORDER — FERROUS GLUCONATE 324(37.5)
324 TABLET ORAL 2 TIMES DAILY
Qty: 60 TABLET | Refills: 11 | Status: SHIPPED
Start: 2019-04-03 | End: 2020-08-17 | Stop reason: DRUGHIGH

## 2019-04-03 NOTE — PATIENT INSTRUCTIONS
To Ms. Ricardo Collado daughter: She should be getting ferrous gluconate (iron supplement) twice a day. From the list you gave her it appears that she is only getting it once a day. Her blood counts are still low and her iron levels are also still low. I also changed her from ferrous sulfate to ferrous gluconate because of constipation (ferrous sulfate is more likely to cause constipation). Patient Education        Eating Healthy Foods: Care Instructions  Your Care Instructions    Eating healthy foods can help lower your risk for disease. Healthy food gives you energy and keeps your heart strong, your brain active, your muscles working, and your bones strong. A healthy diet includes a variety of foods from the basic food groups: grains, vegetables, fruits, milk and milk products, and meat and beans. Some people may eat more of their favorite foods from only one food group and, as a result, miss getting the nutrients they need. So, it is important to pay attention not only to what you eat but also to what you are missing from your diet. You can eat a healthy, balanced diet by making a few small changes. Follow-up care is a key part of your treatment and safety. Be sure to make and go to all appointments, and call your doctor if you are having problems. It's also a good idea to know your test results and keep a list of the medicines you take. How can you care for yourself at home? Look at what you eat  · Keep a food diary for a week or two and record everything you eat or drink. Track the number of servings you eat from each food group. · For a balanced diet every day, eat a variety of:  ? 6 or more ounce-equivalents of grains, such as cereals, breads, crackers, rice, or pasta, every day.  An ounce-equivalent is 1 slice of bread, 1 cup of ready-to-eat cereal, or ½ cup of cooked rice, cooked pasta, or cooked cereal.  ? 2½ cups of vegetables, especially:  § Dark-green vegetables such as broccoli and spinach. § Orange vegetables such as carrots and sweet potatoes. § Dry beans (such as chou and kidney beans) and peas (such as lentils). ? 2 cups of fresh, frozen, or canned fruit. A small apple or 1 banana or orange equals 1 cup. ? 3 cups of nonfat or low-fat milk, yogurt, or other milk products. ? 5½ ounces of meat and beans, such as chicken, fish, lean meat, beans, nuts, and seeds. One egg, 1 tablespoon of peanut butter, ½ ounce nuts or seeds, or ¼ cup of cooked beans equals 1 ounce of meat. · Learn how to read food labels for serving sizes and ingredients. Fast-food and convenience-food meals often contain few or no fruits or vegetables. Make sure you eat some fruits and vegetables to make the meal more nutritious. · Look at your food diary. For each food group, add up what you have eaten and then divide the total by the number of days. This will give you an idea of how much you are eating from each food group. See if you can find some ways to change your diet to make it more healthy. Start small  · Do not try to make dramatic changes to your diet all at once. You might feel that you are missing out on your favorite foods and then be more likely to fail. · Start slowly, and gradually change your habits. Try some of the following:  ? Use whole wheat bread instead of white bread. ? Use nonfat or low-fat milk instead of whole milk. ? Eat brown rice instead of white rice, and eat whole wheat pasta instead of white-flour pasta. ? Try low-fat cheeses and low-fat yogurt. ? Add more fruits and vegetables to meals and have them for snacks. ? Add lettuce, tomato, cucumber, and onion to sandwiches. ? Add fruit to yogurt and cereal.  Enjoy food  · You can still eat your favorite foods. You just may need to eat less of them. If your favorite foods are high in fat, salt, and sugar, limit how often you eat them, but do not cut them out entirely. · Eat a wide variety of foods.   Make healthy choices when eating out  · The type of restaurant you choose can help you make healthy choices. Even fast-food chains are now offering more low-fat or healthier choices on the menu. · Choose smaller portions, or take half of your meal home. · When eating out, try:  ? A veggie pizza with a whole wheat crust or grilled chicken (instead of sausage or pepperoni). ? Pasta with roasted vegetables, grilled chicken, or marinara sauce instead of cream sauce. ? A vegetable wrap or grilled chicken wrap. ? Broiled or poached food instead of fried or breaded items. Make healthy choices easy  · Buy packaged, prewashed, ready-to-eat fresh vegetables and fruits, such as baby carrots, salad mixes, and chopped or shredded broccoli and cauliflower. · Buy packaged, presliced fruits, such as melon or pineapple. · Choose 100% fruit or vegetable juice instead of soda. Limit juice intake to 4 to 6 oz (½ to ¾ cup) a day. · Blend low-fat yogurt, fruit juice, and canned or frozen fruit to make a smoothie for breakfast or a snack. Where can you learn more? Go to https://Gracious Eloise.MarketInvoice. org and sign in to your Karma Platform account. Enter Y471 in the [facility] box to learn more about \"Eating Healthy Foods: Care Instructions. \"     If you do not have an account, please click on the \"Sign Up Now\" link. Current as of: March 28, 2018  Content Version: 11.9  © 7811-5398 BetterPet, Incorporated. Care instructions adapted under license by [facility] ([facility]). If you have questions about a medical condition or this instruction, always ask your healthcare professional. [place] [address] any warranty or liability for your use of this information. Patient Education        Learning About Benefits From Quitting Smoking  How does quitting smoking make you healthier? If you're thinking about quitting smoking, you may have a few reasons to be smoke-free. Your health may be one of them.   · When you quit smoking, you lower your risks for cancer, lung disease, heart attack, stroke, blood vessel disease, and blindness from macular degeneration. · When you're smoke-free, you get sick less often, and you heal faster. You are less likely to get colds, flu, bronchitis, and pneumonia. · As a nonsmoker, you may find that your mood is better and you are less stressed. When and how will you feel healthier? Quitting has real health benefits that start from day 1 of being smoke-free. And the longer you stay smoke-free, the healthier you get and the better you feel. The first hours  · After just 20 minutes, your blood pressure and heart rate go down. That means there's less stress on your heart and blood vessels. · Within 12 hours, the level of carbon monoxide in your blood drops back to normal. That makes room for more oxygen. With more oxygen in your body, you may notice that you have more energy than when you smoked. After 2 weeks  · Your lungs start to work better. · Your risk of heart attack starts to drop. After 1 month  · When your lungs are clear, you cough less and breathe deeper, so it's easier to be active. · Your sense of taste and smell return. That means you can enjoy food more than you have since you started smoking. Over the years  · After 1 year, your risk of heart disease is half what it would be if you kept smoking. · After 5 years, your risk of stroke starts to shrink. Within a few years after that, it's about the same as if you'd never smoked. · After 10 years, your risk of dying from lung cancer is cut by about half. And your risk for many other types of cancer is lower too. How would quitting help others in your life? When you quit smoking, you improve the health of everyone who now breathes in your smoke. · Their heart, lung, and cancer risks drop, much like yours. · They are sick less.  For babies and small children, living smoke-free means they're less likely to have ear infections, pneumonia, and bronchitis. · If you're a woman who is or will be pregnant someday, quitting smoking means a healthier . · Children who are close to you are less likely to become adult smokers. Where can you learn more? Go to https://nancy.[employer] Supply. org and sign in to your Exotel account. Enter [id number] in the [facility] box to learn more about \"Learning About Benefits From Quitting Smoking. \"     If you do not have an account, please click on the \"Sign Up Now\" link. Current as of: 2018  Content Version: 11.9  © 6198-3052 Fannect. Care instructions adapted under license by [facility] ([facility]). If you have questions about a medical condition or this instruction, always ask your healthcare professional. [address] any warranty or liability for your use of this information. Patient Education        Stopping Smoking: Care Instructions  Your Care Instructions  Cigarette smokers crave the nicotine in cigarettes. Giving it up is much harder than simply changing a habit. Your body has to stop craving the nicotine. It is hard to quit, but you can do it. There are many tools that people use to quit smoking. You may find that combining tools works best for you. There are several steps to quitting. First you get ready to quit. Then you get support to help you. After that, you learn new skills and behaviors to become a nonsmoker. For many people, a necessary step is getting and using medicine. Your doctor will help you set up the plan that best meets your needs. You may want to attend a smoking cessation program to help you quit smoking. When you choose a program, look for one that has proven success. Ask your doctor for ideas. You will greatly increase your chances of success if you take medicine as well as get counseling or join a cessation program.  Some of the changes you feel when you first quit tobacco are uncomfortable.  Your body will miss the nicotine at first, and you may feel short-tempered and grumpy. You may have trouble sleeping or concentrating. Medicine can help you deal with these symptoms. You may struggle with changing your smoking habits and rituals. The last step is the tricky one: Be prepared for the smoking urge to continue for a time. This is a lot to deal with, but keep at it. You will feel better. Follow-up care is a key part of your treatment and safety. Be sure to make and go to all appointments, and call your doctor if you are having problems. It's also a good idea to know your test results and keep a list of the medicines you take. How can you care for yourself at home? · Ask your family, friends, and coworkers for support. You have a better chance of quitting if you have help and support. · Join a support group, such as Nicotine Anonymous, for people who are trying to quit smoking. · Consider signing up for a smoking cessation program, such as the American Lung Association's Freedom from Smoking program.  · Get text messaging support. Go to the website at www.smokefree. gov to sign up for the [facility] program.  · Set a quit date. Pick your date carefully so that it is not right in the middle of a big deadline or stressful time. Once you quit, do not even take a puff. Get rid of all ashtrays and lighters after your last cigarette. Clean your house and your clothes so that they do not smell of smoke. · Learn how to be a nonsmoker. Think about ways you can avoid those things that make you reach for a cigarette. ? Avoid situations that put you at greatest risk for smoking. For some people, it is hard to have a drink with friends without smoking. For others, they might skip a coffee break with coworkers who smoke. ? Change your daily routine. Take a different route to work or eat a meal in a different place. · Cut down on stress.  Calm yourself or release tension by doing an activity you enjoy, such as reading a book, taking a hot bath, or gardening. · Talk to your doctor or pharmacist about nicotine replacement therapy, which replaces the nicotine in your body. You still get nicotine but you do not use tobacco. Nicotine replacement products help you slowly reduce the amount of nicotine you need. These products come in several forms, many of them available over-the-counter:  ? Nicotine patches  ? Nicotine gum and lozenges  ? Nicotine inhaler  · Ask your doctor about bupropion (Wellbutrin) or varenicline (Chantix), which are prescription medicines. They do not contain nicotine. They help you by reducing withdrawal symptoms, such as stress and anxiety. · Some people find hypnosis, acupuncture, and massage helpful for ending the smoking habit. · Eat a healthy diet and get regular exercise. Having healthy habits will help your body move past its craving for nicotine. · Be prepared to keep trying. Most people are not successful the first few times they try to quit. Do not get mad at yourself if you smoke again. Make a list of things you learned and think about when you want to try again, such as next week, next month, or next year. Where can you learn more? Go to https://GoshipeSiano Mobile Silicon.boosk. org and sign in to your FiPath account. Enter T019 in the [facility] box to learn more about \"Stopping Smoking: Care Instructions. \"     If you do not have an account, please click on the \"Sign Up Now\" link. Current as of: September 26, 2018  Content Version: 11.9  © 0331-1982 Roamer. Care instructions adapted under license by [facility] ([facility]). If you have questions about a medical condition or this instruction, always ask your healthcare professional. [place] [address] any warranty or liability for your use of this information.          Patient Education        Preventing Falls: Care Instructions  Your Care Instructions    Getting around your home safely can be a challenge if you have injuries or health problems that make it easy for you to fall. Loose rugs and furniture in walkways are among the dangers for many older people who have problems walking or who have poor eyesight. People who have conditions such as arthritis, osteoporosis, or dementia also have to be careful not to fall. You can make your home safer with a few simple measures. Follow-up care is a key part of your treatment and safety. Be sure to make and go to all appointments, and call your doctor if you are having problems. It's also a good idea to know your test results and keep a list of the medicines you take. How can you care for yourself at home? Taking care of yourself  · You may get dizzy if you do not drink enough water. To prevent dehydration, drink plenty of fluids, enough so that your urine is light yellow or clear like water. Choose water and other caffeine-free clear liquids. If you have kidney, heart, or liver disease and have to limit fluids, talk with your doctor before you increase the amount of fluids you drink. · Exercise regularly to improve your strength, muscle tone, and balance. Walk if you can. Swimming may be a good choice if you cannot walk easily. · Have your vision and hearing checked each year or any time you notice a change. If you have trouble seeing and hearing, you might not be able to avoid objects and could lose your balance. · Know the side effects of the medicines you take. Ask your doctor or pharmacist whether the medicines you take can affect your balance. Sleeping pills or sedatives can affect your balance. · Limit the amount of alcohol you drink. Alcohol can impair your balance and other senses. · Ask your doctor whether calluses or corns on your feet need to be removed. If you wear loose-fitting shoes because of calluses or corns, you can lose your balance and fall. · Talk to your doctor if you have numbness in your feet.   Preventing falls at home  · Remove raised doorway thresholds, throw rugs, and clutter. Repair loose carpet or raised areas in the floor. · Move furniture and electrical cords to keep them out of walking paths. · Use nonskid floor wax, and wipe up spills right away, especially on ceramic tile floors. · If you use a walker or cane, put rubber tips on it. If you use crutches, clean the bottoms of them regularly with an abrasive pad, such as steel wool. · Keep your house well lit, especially [employer], and outside walkways. Use night-lights in areas such as hallways and bathrooms. Add extra light switches or use remote switches (such as switches that go on or off when you clap your hands) to make it easier to turn lights on if you have to get up during the night. · Install sturdy handrails on stairways. · Move items in your cabinets so that the things you use a lot are on the lower shelves (about waist level). · Keep a cordless phone and a flashlight with new batteries by your bed. If possible, put a phone in each of the main rooms of your house, or carry a cell phone in case you fall and cannot reach a phone. Or, you can wear a device around your neck or wrist. You push a button that sends a signal for help. · Wear low-heeled shoes that fit well and give your feet good support. Use footwear with nonskid soles. Check the heels and soles of your shoes for wear. Repair or replace worn heels or soles. · Do not wear socks without shoes on wood floors. · Walk on the grass when the sidewalks are slippery. If you live in an area that gets snow and ice in the winter, sprinkle salt on slippery steps and sidewalks. Preventing falls in the bath  · Install grab bars and nonskid mats inside and outside your shower or tub and near the toilet and sinks. · Use shower chairs and bath benches. · Use a hand-held shower head that will allow you to sit while showering.   · Get into a tub or shower by putting the weaker leg in first. Get out of a tub or shower with your strong side first.  · Repair loose toilet seats and consider installing a raised toilet seat to make getting on and off the toilet easier. · Keep your bathroom door unlocked while you are in the shower. Where can you learn more? Go to https://chpenessa.Trippin In. org and sign in to your Clipboardhart account. Enter [id number] [id number] in the [facility] box to learn more about \"Preventing Falls: Care Instructions. \"     If you do not have an account, please click on the \"Sign Up Now\" link. Current as of: March 15, 2018  Content Version: 11.9  © 8119-8892 NPM. Care instructions adapted under license by [facility] ([facility]). If you have questions about a medical condition or this instruction, always ask your healthcare professional. [address] any warranty or liability for your use of this information. Patient Education        Preventing Outdoor Falls: Care Instructions  Your Care Instructions    Worries about falls don't need to keep you indoors. Outdoor activities like walking have big benefits for your health. You will need to watch your step and learn a few safety measures. If you are worried about having a fall outdoors, ask your doctor about exercises, classes, or physical therapy that may help. You can learn ways to gain strength, flexibility, and balance. Ask if it might help to use a cane or walker. You can make your time outdoors safer with a few simple measures. Follow-up care is a key part of your treatment and safety. Be sure to make and go to all appointments, and call your doctor if you are having problems. It's also a good idea to know your test results and keep a list of the medicines you take. How can you prevent falls outdoors? · Wear shoes with firm soles and low heels. If you have to walk on an icy surface, use grippers that can be worn over your shoes in bad weather. · Be extra careful if weather is bad. Walk on the grass when the sidewalks are slick.  If you live in a place that gets snow and ice in the winter, sprinkle salt on slippery stairs and sidewalks. · Be careful getting on or off buses and trains or getting in and out of cars. If handrails are available, use them. · Be careful when you cross the street. Look for crosswalks or places where curb cuts or ramps are present. · Try not to hurry, especially if you are carrying something. · Be cautious in parking lots or garages. There may be curbs or changes in pavement, or the height of the pavement may vary. · Make sure to wear the correct eyeglasses, if you need them. Reading glasses or bifocals can make it harder to see hazards that might be in your way. · If you are walking outdoors for exercise, try to:  ? Walk in well-lighted, well-maintained areas. These include high school or college tracks, shopping malls, and public spaces. ? Walk with a partner. ? Watch out for cracked sidewalks, curbs, changes in the height of the pavement, exposed tree roots, and debris such as fallen leaves or branches. Where can you learn more? Go to https://SpringbotpePose.com.Renovar. org and sign in to your [employer] account. Enter E241 in the [facility] box to learn more about \"Preventing Outdoor Falls: Care Instructions. \"     If you do not have an account, please click on the \"Sign Up Now\" link. Current as of: March 15, 2018  Content Version: 11.9  © 8585-3664 Hack [employer]state, Incorporated. Care instructions adapted under license by [facility] ([facility]). If you have questions about a medical condition or this instruction, always ask your healthcare professional. [place] [address] any warranty or liability for your use of this information.          Patient Education        How to Get Up Safely After a Fall: Care Instructions  Your Care Instructions    If you have injuries, health problems, or other reasons that may make it easy for you to fall at home, it is a good idea to learn how to get up safely after a fall. Learning how to get up correctly can help you avoid making an injury worse. Also, knowing what to do if you cannot get up can help you stay safe until help arrives. Follow-up care is a key part of your treatment and safety. Be sure to make and go to all appointments, and call your doctor if you are having problems. It's also a good idea to know your test results and keep a list of the medicines you take. How can you care for yourself after a fall? If you think you can get up  First lie still for a few minutes and think about how you feel. If your body feels okay and you think you can get up safely, follow the rest of the steps below:  1. Look for a chair or other piece of furniture that is close to you. 2. Roll onto your side and rest. Roll by turning your head in the direction you want to roll, move your shoulder and arm, then hip and leg in the same direction. 3. Lie still for a moment to let your blood pressure adjust.  4. Slowly push your upper body up, lift your head, and take a moment to rest.  5. Slowly get up on your hands and knees, and crawl to the chair or other stable piece of furniture. 6. Put your hands on the chair. 7. Move one foot forward, and place it flat on the floor. Your other leg should be bent with the knee on the floor. 8. Rise slowly, turn your body, and sit in the chair. Stay seated for a bit and think about how you feel. Call for help. Even if you feel okay, let someone know what happened to you. You might not know that you have a serious injury. If you cannot get up  1. If you think you are injured after a fall or you cannot get up, try not to panic. 2. Call out for help. 3. If you have a phone within reach or you have an emergency call device, use it to call for help. 4. If you do not have a phone within reach, try to slide yourself toward it.  If you cannot get to the phone, try to slide toward a door or window or a place where you think you can be heard.  5. [place] or use an object to make noise so someone might hear you. 6. If you can reach something that you can use for a pillow, place it under your head. Try to stay warm by covering yourself with a blanket or clothing while you wait for help. When should you call for help? Call 911 anytime you think you may need emergency care. For example, call if:    · You passed out (lost consciousness).     · You cannot get up after a fall.     · You have severe pain.    Call your doctor now or seek immediate medical care if:    · You have new or worse pain.     · You are dizzy or lightheaded.     · You hit your head.    Watch closely for changes in your health, and be sure to contact your doctor if:    · You do not get better as expected. Where can you learn more? Go to https://[email or web address].The Gifts [email or web address] org and sign in to your Ulmon account. Enter O198 in the ClearKarma box to learn more about \"How to Get Up Safely After a Fall: Care Instructions. \"     If you do not have an account, please click on the \"Sign Up Now\" link. Current as of: March 15, 2018  Content Version: 11.9  © 1747-0008 [employer]. Care instructions adapted under license by [facility] ([facility]). If you have questions about a medical condition or this instruction, always ask your healthcare professional. [place] [address] any warranty or liability for your use of this information.

## 2019-04-03 NOTE — PROGRESS NOTES
DR. Joshua Jacob - PROGRESS NOTE    CHIEF COMPLAINT/HISTORY OF CHIEF COMPLAINT: This 80 y.o.  female comes in today for ongoing evaluation and management of her elevated fasting glucose, hyperlipidemia, hypothyroidism, chronic obstructive pulmonary disease, osteoarthritis, gastroesophageal reflux disease, osteoarthritis, iron deficiency anemia, anxiety, depression, and tobacco abuse. She was seen by Dr. Tiera Bruner yesterday for a cerumen impaction. He cleaned her ears out and she feels better. She is going to be pursuing hearing aids. She tries to control her elevated fasting glucose with diet and exercise. She takes Ativan for anxiety, which does help. She is also on Zoloft for depression, which is helping. She takes ferrous sulfate for iron deficiency anemia. She is supposed to be taking the iron supplement twice a day but it is not clear from the list she brought in whether she is getting it twice a day or not. She doesn't like the ferrous sulfate because it constipates her. She tries to control her hyperlipidemia with diet and exercise. Her gastroesophageal reflux disease has been stable. She has chronic obstructive pulmonary disease and is still smoking 1 pack of cigarettes a day. She has not had any chronic obstructive pulmonary disease exacerbations. She has not needed to use her albuterol inhaler. Otherwise she seems to be doing fairly well and denies any other complaints.        ALLERGIES/INTOLERANCES:   Allergies   Allergen Reactions    Acyclovir And Related Diarrhea and Nausea Only    Albuterol     Amitriptyline      Dizziness      Asa [Aspirin]      Stomach upset      Betadine [Povidone Iodine]     Biaxin [Clarithromycin] Nausea Only    Buspar [Buspirone]     Cefuroxime Axetil      Diarrhea      Celestone [Betamethasone]     Codeine     Darvocet A500 [Propoxyphene N-Acetaminophen]     Dicyclomine Hcl     Doxycycline Nausea Only    Esomeprazole Magnesium     Fluconazole     Lidex [Fluocinolone]     Neomycin     Pcn [Penicillins] Hives    Protonix [Pantoprazole]     Quinolones Hives    Statins [Statins]      Leg cramps      Tape Kvng Ditto Tape]     Tramadol      \"makes her walk into walls\"    Valium     Vicodin [Hydrocodone-Acetaminophen]     Zithromax [Azithromycin]      Nausea      Hydrocodone Nausea Only       MEDICATIONS:   Outpatient Medications Marked as Taking for the 4/3/19 encounter (Office Visit) with Lawerence Alan, DO   Medication Sig Dispense Refill    sertraline (ZOLOFT) 25 MG tablet Take 0.5 tablets by mouth daily 15 tablet 11    LORazepam (ATIVAN) 1 MG tablet Take 1 tablet by mouth every 6 hours as needed for Anxiety for up to 30 days. (Patient taking differently: Take 0.5 mg by mouth every 6 hours as needed for Anxiety. ) 120 tablet 0    nitroGLYCERIN (NITROSTAT) 0.4 MG SL tablet Place 1 tablet under the tongue every 5 minutes as needed for Chest pain 25 tablet 3    ferrous sulfate 325 (65 Fe) MG tablet Take 1 tablet by mouth 2 times daily 60 tablet 11    cetirizine (ZYRTEC) 10 MG tablet Take 10 mg by mouth daily      Multiple Minerals-Vitamins (CALCIUM-MAGNESIUM-ZINC-D3) TABS Take 1 tablet by mouth daily      Fiber POWD Take by mouth nightly      Multiple Vitamins-Minerals (I-DANIA) TABS Take 1 tablet by mouth 2 times daily      Cyanocobalamin (B-12) 1000 MCG CAPS Take 1 tablet by mouth daily      miconazole (NEOSPORIN AF) 2 % cream Apply topically 2 times daily Apply topically 2 times daily.  Propylene Glycol (SYSTANE BALANCE OP) Apply 1 drop to eye 3 times daily       albuterol (PROVENTIL HFA) 108 (90 BASE) MCG/ACT inhaler Inhale 2 puffs into the lungs every 6 hours as needed for Wheezing or Shortness of Breath. 1 Inhaler 3    acetaminophen (TYLENOL) 500 MG tablet Take 500 mg by mouth every 6 hours as needed for Pain.       Probiotic Product (PROBIOTIC DAILY PO) Take 1 tablet by mouth daily TruBiotics      Multiple Vitamins-Minerals (MULTIVITAMIN & MINERAL PO) Take 1 tablet by mouth daily.  Cholecalciferol (VITAMIN D3) 1000 UNITS TABS Take 1 tablet by mouth daily.  senna (SENOKOT) 8.6 MG tablet Take 2 tablets by mouth daily as needed       docusate sodium (COLACE) 100 MG capsule Take 100 mg by mouth 2 times daily       ascorbic acid (VITAMIN C) 500 MG tablet Take 1,000 mg by mouth daily.  Biotin 5000 MCG CAPS Take 2 tablets by mouth daily          IMMUNIZATIONS: Reviewed for influenza and pneumococcal status as indicated in electronic record. REVIEW OF SYSTEMS:     General: negative for - chills, fever or night sweats  Skin: negative for - pruritus or rash  Head: Negative for: headache or recent history of head trauma  Ear, Nose, Throat: negative for - epistaxis, nasal congestion, nasal discharge, sore throat, tinnitus or vertigo  Cardiovascular: negative for - chest pain, dyspnea on exertion or shortness of breath  Respiratory: negative for - cough, hemoptysis or wheezing  Gastrointestinal: positive for - constipation  negative for - diarrhea or nausea/vomiting  Genitourinary: negative for - dysuria, hematuria or nocturia  Musculoskeletal: positive for - joint pain  negative for - muscle pain or muscular weakness  Neurologic: negative for - gait disturbance, numbness/tingling, seizures or tremors  Psychiatric: positive for - anxiety and depression  negative for - suicidal ideation     PHYSICAL EXAMINATION:    Wt Readings from Last 2 Encounters:   04/03/19 96 lb (43.5 kg)   04/02/19 93 lb 4.1 oz (42.3 kg)       Vitals:    04/03/19 1605   BP: 110/60   Site: Right Upper Arm   Position: Sitting   Cuff Size: Medium Adult   Pulse: 76   Resp: 16   Weight: 96 lb (43.5 kg)   Height: 5' 4.02\" (1.626 m)     Body mass index is 16.47 kg/m². General: This is a 80 y.o.  female who is alert and oriented to person, place and time. She appears to be her stated age and does not appear to be in any acute distress.   Skin: Skin color, texture, turgor normal. No rashes or lesions. HEENT/Neck: Head: Normal, normocephalic, atraumatic. Eye: Normal external eye, conjunctiva, lids cornea, BLAIR. Ears: Normal TM's bilaterally. Normal auditory canals and external ears. Non-tender. Nose: Normal external nose, mucus membranes and septum. Pharynx: Dental Hygiene adequate. Normal buccal mucosa. Normal pharynx. Neck / Thyroid: Supple, no masses, nodes, nodules or enlargement. Lungs: Normal - CTA without rales, rhonchi, or wheezing. Heart: regular rate and rhythm, S1, S2 normal, no murmur, click, rub or gallop No S3 or S4. Abdomen: Non-obese, soft, non-distended, non-tender, normal active bowel sounds, no masses palpated and no hepatosplenomegaly  Extremities: There is no clubbing, cyanosis, or edema in any of the extremities. Neurologic:  cranial nerves II-XII are grossly intact  Osteopathic Structural Examination: Patient was examined in the seated and standing positions. There was full range of motion in the cervical, thoracic, and lumbar spines. No scoliosis. No change in thoracic kyphosis or lumbar lordosis. No increased paravertebral muscle tenderness. ASSESSMENT/PLAN:    1. Elevated fasting glucose, stable  - We reviewed her fasting glucose and Hemoglobin A1c. Results showed stable control.  - She will continue to watch her diet and exercise to keep her elevated fasting glucose under control.   - We will continue to monitor for the development of diabetes. - Hemoglobin A1C; Future  - Basic Metabolic Panel; Future    2. Mixed hyperlipidemia, controlled  - She will continue to watch her diet and exercise    3. Other specified hypothyroidism, stable  - She will continue to take Synthroid    4. Coronary artery disease involving native coronary artery of native heart, angina presence unspecified, stable  - We will continue to monitor     5.  Other emphysema ([address] stable  - We will continue to monitor  - [place] TOBACCO USE CESSATION INTERMEDIATE 3-10 MINUTES    6. Gastroesophageal reflux disease without esophagitis, stable  - We will continue to monitor     7. Iron deficiency anemia secondary to inadequate dietary iron intake, worse  - We will switch her from ferrous sulfate to ferrous gluconate 324 mg twice a day  - We will have her daughter make sure she is giving her the supplement twice a day instead of once a day  - CBC Auto Differential; Future  - Iron and TIBC; Future  - Ferritin; Future    8. Major depressive disorder with single episode, in partial remission ([address] stable  9. Anxiety, stable  - She will continue to use the psychiatric medicines    10. Primary osteoarthritis involving multiple joints, stable  - We will continue to monitor     11. Tobacco abuse  12. Cigarette smoker  - She was advised to quit smoking immediately and completely. The risks of continued smoking were reviewed with her and she did verbalize understanding.  - Tobacco Cessation Counseling: Patient advised about behavior change, including information about personal health harms, usage of appropriate cessation measures and benefits of cessation. Time spent (minutes): 5 minutes   - [place] TOBACCO USE CESSATION INTERMEDIATE 3-10 MINUTES    13. At high risk for falls  - On the basis of positive falls risk screening, assessment and plan is as follows: home safety tips provided. Orders Placed This Encounter   Procedures    Hemoglobin A1C     Standing Status:   Future     Standing Expiration Date:   4/2/2020    CBC Auto Differential     Standing Status:   Future     Standing Expiration Date:   4/2/2020    Basic Metabolic Panel     Standing Status:   Future     Standing Expiration Date:   4/2/2020    Iron and TIBC     Standing Status:   Future     Standing Expiration Date:   4/2/2020     Order Specific Question:   Is Patient Fasting? Answer:   no     Order Specific Question:   No of Hours?      Answer:   na    Ferritin     Standing Status:   Future     Standing Expiration

## 2019-04-11 DIAGNOSIS — F41.9 ANXIETY: ICD-10-CM

## 2019-04-11 RX ORDER — LORAZEPAM 1 MG/1
1 TABLET ORAL EVERY 6 HOURS PRN
Qty: 120 TABLET | Refills: 0 | Status: SHIPPED | OUTPATIENT
Start: 2019-04-11 | End: 2019-05-10 | Stop reason: SDUPTHER

## 2019-05-09 DIAGNOSIS — F41.9 ANXIETY: ICD-10-CM

## 2019-05-10 RX ORDER — LORAZEPAM 1 MG/1
1 TABLET ORAL EVERY 6 HOURS PRN
Qty: 120 TABLET | Refills: 0 | Status: SHIPPED | OUTPATIENT
Start: 2019-05-10 | End: 2019-06-14 | Stop reason: SDUPTHER

## 2019-06-14 DIAGNOSIS — F41.9 ANXIETY: ICD-10-CM

## 2019-06-14 RX ORDER — LORAZEPAM 1 MG/1
1 TABLET ORAL EVERY 6 HOURS PRN
Qty: 120 TABLET | Refills: 0 | Status: SHIPPED | OUTPATIENT
Start: 2019-06-14 | End: 2019-08-09 | Stop reason: SDUPTHER

## 2019-06-15 NOTE — TELEPHONE ENCOUNTER
[place] reviewed today    Controlled Substance Monitoring:    Acute and Chronic Pain Monitoring:   RX Monitoring 6/14/2019   Attestation -   Acute Pain Prescriptions -   Periodic Controlled Substance Monitoring No signs of potential drug abuse or diversion identified.

## 2019-07-02 ENCOUNTER — HOSPITAL ENCOUNTER (OUTPATIENT)
Dept: LAB | Age: 84
Discharge: HOME | End: 2019-07-02
Payer: MEDICARE

## 2019-07-02 DIAGNOSIS — R73.01 ELEVATED FASTING GLUCOSE: ICD-10-CM

## 2019-07-02 DIAGNOSIS — D50.8 IRON DEFICIENCY ANEMIA SECONDARY TO INADEQUATE DIETARY IRON INTAKE: ICD-10-CM

## 2019-07-02 LAB
ABSOLUTE EOS #: 0.1 K/UL (ref 0–0.4)
ABSOLUTE IMMATURE GRANULOCYTE: ABNORMAL K/UL (ref 0–0.3)
ABSOLUTE LYMPH #: 1.4 K/UL (ref 1–4.8)
ABSOLUTE MONO #: 0.8 K/UL (ref 0.1–1.2)
ANION GAP SERPL CALCULATED.3IONS-SCNC: 16 MMOL/L (ref 9–17)
BASOPHILS # BLD: 1 % (ref 0–1)
BASOPHILS ABSOLUTE: 0.1 K/UL (ref 0–0.2)
BUN BLDV-MCNC: 19 MG/DL (ref 8–23)
BUN/CREAT BLD: 22 (ref 9–20)
CALCIUM SERPL-MCNC: 10.3 MG/DL (ref 8.6–10.4)
CHLORIDE BLD-SCNC: 98 MMOL/L (ref 98–107)
CO2: 24 MMOL/L (ref 20–31)
CREAT SERPL-MCNC: 0.85 MG/DL (ref 0.5–0.9)
DIFFERENTIAL TYPE: ABNORMAL
EOSINOPHILS RELATIVE PERCENT: 1 % (ref 1–7)
ESTIMATED AVERAGE GLUCOSE: 103 MG/DL
FERRITIN: 21 UG/L (ref 13–150)
GFR AFRICAN AMERICAN: >60 ML/MIN
GFR NON-AFRICAN AMERICAN: >60 ML/MIN
GFR SERPL CREATININE-BSD FRML MDRD: ABNORMAL ML/MIN/{1.73_M2}
GFR SERPL CREATININE-BSD FRML MDRD: ABNORMAL ML/MIN/{1.73_M2}
GLUCOSE BLD-MCNC: 137 MG/DL (ref 70–99)
HBA1C MFR BLD: 5.2 % (ref 4.8–5.9)
HCT VFR BLD CALC: 32.6 % (ref 36–46)
HEMOGLOBIN: 10.2 G/DL (ref 12–16)
IMMATURE GRANULOCYTES: ABNORMAL %
IRON SATURATION: 6 % (ref 20–55)
IRON: 28 UG/DL (ref 37–145)
LYMPHOCYTES # BLD: 15 % (ref 16–46)
MCH RBC QN AUTO: 26.2 PG (ref 26–34)
MCHC RBC AUTO-ENTMCNC: 31.2 G/DL (ref 31–37)
MCV RBC AUTO: 84.1 FL (ref 80–100)
MONOCYTES # BLD: 8 % (ref 4–11)
NRBC AUTOMATED: ABNORMAL PER 100 WBC
PDW BLD-RTO: 16.3 % (ref 11–14.5)
PLATELET # BLD: 478 K/UL (ref 140–450)
PLATELET ESTIMATE: ABNORMAL
PMV BLD AUTO: 7.4 FL (ref 6–12)
POTASSIUM SERPL-SCNC: 4 MMOL/L (ref 3.7–5.3)
RBC # BLD: 3.88 M/UL (ref 4–5.2)
RBC # BLD: ABNORMAL 10*6/UL
SEG NEUTROPHILS: 75 % (ref 43–77)
SEGMENTED NEUTROPHILS ABSOLUTE COUNT: 7 K/UL (ref 1.8–7.7)
SODIUM BLD-SCNC: 138 MMOL/L (ref 135–144)
TOTAL IRON BINDING CAPACITY: 466 UG/DL (ref 250–450)
UNSATURATED IRON BINDING CAPACITY: 438 UG/DL (ref 112–347)
WBC # BLD: 9.4 K/UL (ref 3.5–11)
WBC # BLD: ABNORMAL 10*3/UL

## 2019-07-02 PROCEDURE — 83550 IRON BINDING TEST: CPT

## 2019-07-02 PROCEDURE — 83540 ASSAY OF IRON: CPT

## 2019-07-02 PROCEDURE — 83036 HEMOGLOBIN GLYCOSYLATED A1C: CPT

## 2019-07-02 PROCEDURE — 80048 BASIC METABOLIC PNL TOTAL CA: CPT

## 2019-07-02 PROCEDURE — 82728 ASSAY OF FERRITIN: CPT

## 2019-07-02 PROCEDURE — 36415 COLL VENOUS BLD VENIPUNCTURE: CPT

## 2019-07-02 PROCEDURE — 85025 COMPLETE CBC W/AUTO DIFF WBC: CPT

## 2019-07-03 ENCOUNTER — OFFICE VISIT (OUTPATIENT)
Dept: INTERNAL MEDICINE | Age: 84
End: 2019-07-03
Payer: MEDICARE

## 2019-07-03 VITALS
WEIGHT: 96.8 LBS | HEART RATE: 72 BPM | DIASTOLIC BLOOD PRESSURE: 64 MMHG | RESPIRATION RATE: 16 BRPM | SYSTOLIC BLOOD PRESSURE: 102 MMHG | HEIGHT: 64 IN | BODY MASS INDEX: 16.53 KG/M2

## 2019-07-03 DIAGNOSIS — E78.2 MIXED HYPERLIPIDEMIA: ICD-10-CM

## 2019-07-03 DIAGNOSIS — Z72.0 TOBACCO ABUSE: ICD-10-CM

## 2019-07-03 DIAGNOSIS — R73.01 ELEVATED FASTING GLUCOSE: Primary | ICD-10-CM

## 2019-07-03 DIAGNOSIS — E03.8 OTHER SPECIFIED HYPOTHYROIDISM: ICD-10-CM

## 2019-07-03 DIAGNOSIS — F17.210 CIGARETTE SMOKER: ICD-10-CM

## 2019-07-03 DIAGNOSIS — F32.4 MAJOR DEPRESSIVE DISORDER WITH SINGLE EPISODE, IN PARTIAL REMISSION (HCC): ICD-10-CM

## 2019-07-03 DIAGNOSIS — D50.8 IRON DEFICIENCY ANEMIA SECONDARY TO INADEQUATE DIETARY IRON INTAKE: ICD-10-CM

## 2019-07-03 DIAGNOSIS — I25.10 CORONARY ARTERY DISEASE INVOLVING NATIVE CORONARY ARTERY OF NATIVE HEART, ANGINA PRESENCE UNSPECIFIED: ICD-10-CM

## 2019-07-03 DIAGNOSIS — K21.9 GASTROESOPHAGEAL REFLUX DISEASE WITHOUT ESOPHAGITIS: ICD-10-CM

## 2019-07-03 DIAGNOSIS — M15.9 PRIMARY OSTEOARTHRITIS INVOLVING MULTIPLE JOINTS: ICD-10-CM

## 2019-07-03 DIAGNOSIS — F41.9 ANXIETY: ICD-10-CM

## 2019-07-03 DIAGNOSIS — J43.8 OTHER EMPHYSEMA (HCC): ICD-10-CM

## 2019-07-03 PROCEDURE — G8427 DOCREV CUR MEDS BY ELIG CLIN: HCPCS | Performed by: INTERNAL MEDICINE

## 2019-07-03 PROCEDURE — 99214 OFFICE O/P EST MOD 30 MIN: CPT | Performed by: INTERNAL MEDICINE

## 2019-07-03 PROCEDURE — 99406 BEHAV CHNG SMOKING 3-10 MIN: CPT | Performed by: INTERNAL MEDICINE

## 2019-07-03 PROCEDURE — G8599 NO ASA/ANTIPLAT THER USE RNG: HCPCS | Performed by: INTERNAL MEDICINE

## 2019-07-03 PROCEDURE — G8926 SPIRO NO PERF OR DOC: HCPCS | Performed by: INTERNAL MEDICINE

## 2019-07-03 PROCEDURE — 4040F PNEUMOC VAC/ADMIN/RCVD: CPT | Performed by: INTERNAL MEDICINE

## 2019-07-03 PROCEDURE — 1123F ACP DISCUSS/DSCN MKR DOCD: CPT | Performed by: INTERNAL MEDICINE

## 2019-07-03 PROCEDURE — 4004F PT TOBACCO SCREEN RCVD TLK: CPT | Performed by: INTERNAL MEDICINE

## 2019-07-03 PROCEDURE — 0518F FALL PLAN OF CARE DOCD: CPT | Performed by: INTERNAL MEDICINE

## 2019-07-03 PROCEDURE — 1090F PRES/ABSN URINE INCON ASSESS: CPT | Performed by: INTERNAL MEDICINE

## 2019-07-03 PROCEDURE — G8419 CALC BMI OUT NRM PARAM NOF/U: HCPCS | Performed by: INTERNAL MEDICINE

## 2019-07-03 PROCEDURE — 3023F SPIROM DOC REV: CPT | Performed by: INTERNAL MEDICINE

## 2019-07-03 PROCEDURE — 3288F FALL RISK ASSESSMENT DOCD: CPT | Performed by: INTERNAL MEDICINE

## 2019-07-03 NOTE — PATIENT INSTRUCTIONS
marinara sauce instead of cream sauce. ? A vegetable wrap or grilled chicken wrap. ? Broiled or poached food instead of fried or breaded items. Make healthy choices easy  · Buy packaged, prewashed, ready-to-eat fresh vegetables and fruits, such as baby carrots, salad mixes, and chopped or shredded broccoli and cauliflower. · Buy packaged, presliced fruits, such as melon or pineapple. · Choose 100% fruit or vegetable juice instead of soda. Limit juice intake to 4 to 6 oz (½ to ¾ cup) a day. · Blend low-fat yogurt, fruit juice, and canned or frozen fruit to make a smoothie for breakfast or a snack. Where can you learn more? Go to https://The Minerva Projectdedrickeb.Hit Streak Music. org and sign in to your Flux account. Enter X881 in the Sunlight [employer] box to learn more about \"Eating Healthy Foods: Care Instructions. \"     If you do not have an account, please click on the \"Sign Up Now\" link. Current as of: November 7, 2018  Content Version: 12.0  © 8328-4073 Cloud[employer]age. Care instructions adapted under license by [facility] ([facility]). If you have questions about a medical condition or this instruction, always ask your healthcare professional. [address] any warranty or liability for your use of this information. Patient Education        Walking for Exercise: Care Instructions  Your Care Instructions    Walking is one of the easiest ways to get the exercise you need for good health. A brisk, 30-minute walk each day can help you feel better and have more energy. It can help you lower your risk of disease. Walking can help you keep your bones strong and your heart healthy. Check with your doctor before you start a walking plan if you have heart problems, other health issues, or you have not been active in a long time. Follow your doctor's instructions for safe levels of exercise. Follow-up care is a key part of your treatment and safety.  Be sure to make and go to all come in several forms, many of them available over-the-counter:  ? Nicotine patches  ? Nicotine gum and lozenges  ? Nicotine inhaler  · Ask your doctor about bupropion (Wellbutrin) or varenicline (Chantix), which are prescription medicines. They do not contain nicotine. They help you by reducing withdrawal symptoms, such as stress and anxiety. · Some people find hypnosis, acupuncture, and massage helpful for ending the smoking habit. · Eat a healthy diet and get regular exercise. Having healthy habits will help your body move past its craving for nicotine. · Be prepared to keep trying. Most people are not successful the first few times they try to quit. Do not get mad at yourself if you smoke again. Make a list of things you learned and think about when you want to try again, such as next week, next month, or next year. Where can you learn more? Go to https://AXON Ghost [email or web address].Blazent. org and sign in to your [employer] account. Enter R005 in the S&N Airoflo box to learn more about \"Stopping Smoking: Care Instructions. \"     If you do not have an account, please click on the \"Sign Up Now\" link. Current as of: September 26, 2018  Content Version: 12.0  © 6589-4941 [employer]wise, Incorporated. Care instructions adapted under license by [facility] [employer] [facility] ([facility]). If you have questions about a medical condition or this instruction, always ask your healthcare professional. [place] [address] any warranty or liability for your use of this information.

## 2019-07-03 NOTE — PROGRESS NOTES
Tape]     Tramadol      \"makes her walk into walls\"    Valium     Vicodin [Hydrocodone-Acetaminophen]     Zithromax [Azithromycin]      Nausea      Hydrocodone Nausea Only       MEDICATIONS:   Outpatient Medications Marked as Taking for the 7/3/19 encounter (Office Visit) with Johnny Pierre, DO   Medication Sig Dispense Refill    LORazepam (ATIVAN) 1 MG tablet Take 1 tablet by mouth every 6 hours as needed for Anxiety for up to 30 days. 120 tablet 0    ferrous gluconate 324 (37.5 Fe) MG TABS Take 1 tablet by mouth 2 times daily 60 tablet 11    sertraline (ZOLOFT) 25 MG tablet Take 0.5 tablets by mouth daily 15 tablet 11    nitroGLYCERIN (NITROSTAT) 0.4 MG SL tablet Place 1 tablet under the tongue every 5 minutes as needed for Chest pain 25 tablet 3    cetirizine (ZYRTEC) 10 MG tablet Take 10 mg by mouth daily      Multiple Minerals-Vitamins (CALCIUM-MAGNESIUM-ZINC-D3) TABS Take 1 tablet by mouth daily      Fiber POWD Take by mouth nightly      Multiple Vitamins-Minerals (I-DANIA) TABS Take 1 tablet by mouth 2 times daily      Cyanocobalamin (B-12) 1000 MCG CAPS Take 1 tablet by mouth daily      miconazole (NEOSPORIN AF) 2 % cream Apply topically 2 times daily Apply topically 2 times daily.  Propylene Glycol (SYSTANE BALANCE OP) Apply 1 drop to eye 3 times daily       albuterol (PROVENTIL HFA) 108 (90 BASE) MCG/ACT inhaler Inhale 2 puffs into the lungs every 6 hours as needed for Wheezing or Shortness of Breath. 1 Inhaler 3    acetaminophen (TYLENOL) 500 MG tablet Take 500 mg by mouth every 6 hours as needed for Pain.  Probiotic Product (PROBIOTIC DAILY PO) Take 1 tablet by mouth daily TruBiotics      Multiple Vitamins-Minerals (MULTIVITAMIN & MINERAL PO) Take 1 tablet by mouth daily.  Cholecalciferol (VITAMIN D3) 1000 UNITS TABS Take 1 tablet by mouth daily.       senna (SENOKOT) 8.6 MG tablet Take 2 tablets by mouth daily as needed       docusate sodium (COLACE) 100 MG Non-tender. Nose: Normal external nose, mucus membranes and septum. Pharynx: Dental Hygiene adequate. Normal buccal mucosa. Normal pharynx. Neck / Thyroid: Supple, no masses, nodes, nodules or enlargement. Lungs: Normal - CTA without rales, rhonchi, or wheezing. Heart: regular rate and rhythm, S1, S2 normal, no murmur, click, rub or gallop No S3 or S4. Abdomen: Non-obese, soft, non-distended, non-tender, normal active bowel sounds, no masses palpated and no hepatosplenomegaly  Extremities: There is no clubbing, cyanosis, or edema in any of the extremities. Neurologic:  cranial nerves II-XII are grossly intact  Osteopathic Structural Examination: Patient was examined in the seated and standing positions. There was full range of motion in the cervical, thoracic, and lumbar spines. No scoliosis. No change in thoracic kyphosis or lumbar lordosis. No increased paravertebral muscle tenderness. ASSESSMENT/PLAN:    1. Elevated fasting glucose, stable  - We reviewed her fasting glucose and Hemoglobin A1c. Results showed stable control.  - She will continue to watch her diet and exercise to keep her elevated fasting glucose under control.   - We will continue to monitor for the development of diabetes. - Hemoglobin A1C; Future  - Urinalysis with Microscopic; Future    2. Mixed hyperlipidemia, controlled  - She will continue to watch her diet and exercise  - Lipid Panel; Future    3. Other specified hypothyroidism, stable  - She will continue to take Synthroid  - CBC Auto Differential; Future  - Comprehensive Metabolic Panel; Future  - Urinalysis with Microscopic; Future  - TSH without Reflex; Future  - T4, Free; Future    4. Coronary artery disease involving native coronary artery of native heart, angina presence unspecified, stable  - We will continue to monitor     5. Other emphysema ([address] stable  - She will continue to use her breathing medicines  - [place] TOBACCO USE CESSATION INTERMEDIATE 3-10 MINUTES    6.

## 2019-07-26 ENCOUNTER — TELEPHONE (OUTPATIENT)
Dept: INTERNAL MEDICINE | Age: 84
End: 2019-07-26

## 2019-08-09 DIAGNOSIS — F41.9 ANXIETY: ICD-10-CM

## 2019-08-09 RX ORDER — LORAZEPAM 1 MG/1
1 TABLET ORAL EVERY 6 HOURS PRN
Qty: 120 TABLET | Refills: 0 | Status: SHIPPED | OUTPATIENT
Start: 2019-08-09 | End: 2019-09-12 | Stop reason: SDUPTHER

## 2019-09-12 DIAGNOSIS — F41.9 ANXIETY: ICD-10-CM

## 2019-09-12 RX ORDER — LORAZEPAM 1 MG/1
1 TABLET ORAL EVERY 6 HOURS PRN
Qty: 120 TABLET | Refills: 0 | Status: SHIPPED | OUTPATIENT
Start: 2019-09-12 | End: 2019-10-04 | Stop reason: SDUPTHER

## 2019-10-03 ENCOUNTER — HOSPITAL ENCOUNTER (OUTPATIENT)
Dept: LAB | Age: 84
Discharge: HOME | End: 2019-10-03
Payer: MEDICARE

## 2019-10-03 DIAGNOSIS — E03.8 OTHER SPECIFIED HYPOTHYROIDISM: ICD-10-CM

## 2019-10-03 DIAGNOSIS — R73.01 ELEVATED FASTING GLUCOSE: ICD-10-CM

## 2019-10-03 DIAGNOSIS — D50.8 IRON DEFICIENCY ANEMIA SECONDARY TO INADEQUATE DIETARY IRON INTAKE: ICD-10-CM

## 2019-10-03 DIAGNOSIS — E78.2 MIXED HYPERLIPIDEMIA: ICD-10-CM

## 2019-10-03 LAB
-: ABNORMAL
ABSOLUTE EOS #: 0.3 K/UL (ref 0–0.4)
ABSOLUTE IMMATURE GRANULOCYTE: ABNORMAL K/UL (ref 0–0.3)
ABSOLUTE LYMPH #: 1.9 K/UL (ref 1–4.8)
ABSOLUTE MONO #: 0.9 K/UL (ref 0.1–1.2)
ALBUMIN SERPL-MCNC: 4.9 G/DL (ref 3.5–5.2)
ALBUMIN/GLOBULIN RATIO: 1.6 (ref 1–2.5)
ALP BLD-CCNC: 82 U/L (ref 35–104)
ALT SERPL-CCNC: 18 U/L (ref 5–33)
AMORPHOUS: ABNORMAL
ANION GAP SERPL CALCULATED.3IONS-SCNC: 13 MMOL/L (ref 9–17)
AST SERPL-CCNC: 28 U/L
BACTERIA: ABNORMAL
BASOPHILS # BLD: 1 % (ref 0–1)
BASOPHILS ABSOLUTE: 0.1 K/UL (ref 0–0.2)
BILIRUB SERPL-MCNC: 0.45 MG/DL (ref 0.3–1.2)
BILIRUBIN URINE: ABNORMAL
BUN BLDV-MCNC: 14 MG/DL (ref 8–23)
BUN/CREAT BLD: 20 (ref 9–20)
CALCIUM SERPL-MCNC: 10.6 MG/DL (ref 8.6–10.4)
CASTS UA: ABNORMAL /LPF (ref 0–2)
CHLORIDE BLD-SCNC: 98 MMOL/L (ref 98–107)
CHOLESTEROL/HDL RATIO: 2.6
CHOLESTEROL: 193 MG/DL
CO2: 29 MMOL/L (ref 20–31)
COLOR: ABNORMAL
COMMENT UA: ABNORMAL
CREAT SERPL-MCNC: 0.71 MG/DL (ref 0.5–0.9)
CRYSTALS, UA: ABNORMAL /HPF
DIFFERENTIAL TYPE: ABNORMAL
EOSINOPHILS RELATIVE PERCENT: 3 % (ref 1–7)
EPITHELIAL CELLS UA: ABNORMAL /HPF (ref 0–5)
ESTIMATED AVERAGE GLUCOSE: 105 MG/DL
FERRITIN: 468 UG/L (ref 13–150)
GFR AFRICAN AMERICAN: >60 ML/MIN
GFR NON-AFRICAN AMERICAN: >60 ML/MIN
GFR SERPL CREATININE-BSD FRML MDRD: ABNORMAL ML/MIN/{1.73_M2}
GFR SERPL CREATININE-BSD FRML MDRD: ABNORMAL ML/MIN/{1.73_M2}
GLUCOSE BLD-MCNC: 128 MG/DL (ref 70–99)
GLUCOSE URINE: NEGATIVE
HBA1C MFR BLD: 5.3 % (ref 4.8–5.9)
HCT VFR BLD CALC: 38.1 % (ref 36–46)
HDLC SERPL-MCNC: 75 MG/DL
HEMOGLOBIN: 12.2 G/DL (ref 12–16)
IMMATURE GRANULOCYTES: ABNORMAL %
IRON SATURATION: 19 % (ref 20–55)
IRON: 65 UG/DL (ref 37–145)
KETONES, URINE: ABNORMAL
LDL CHOLESTEROL: 96 MG/DL (ref 0–130)
LEUKOCYTE ESTERASE, URINE: NEGATIVE
LYMPHOCYTES # BLD: 23 % (ref 16–46)
MCH RBC QN AUTO: 27.4 PG (ref 26–34)
MCHC RBC AUTO-ENTMCNC: 31.9 G/DL (ref 31–37)
MCV RBC AUTO: 85.8 FL (ref 80–100)
MONOCYTES # BLD: 11 % (ref 4–11)
MUCUS: ABNORMAL
NITRITE, URINE: NEGATIVE
NRBC AUTOMATED: ABNORMAL PER 100 WBC
OTHER OBSERVATIONS UA: ABNORMAL
PDW BLD-RTO: 22 % (ref 11–14.5)
PH UA: 5.5 (ref 5–6)
PLATELET # BLD: 463 K/UL (ref 140–450)
PLATELET ESTIMATE: ABNORMAL
PMV BLD AUTO: 7.7 FL (ref 6–12)
POTASSIUM SERPL-SCNC: 4.2 MMOL/L (ref 3.7–5.3)
PROTEIN UA: ABNORMAL
RBC # BLD: 4.44 M/UL (ref 4–5.2)
RBC # BLD: ABNORMAL 10*6/UL
RBC UA: ABNORMAL /HPF (ref 0–4)
RENAL EPITHELIAL, UA: ABNORMAL /HPF
SEG NEUTROPHILS: 62 % (ref 43–77)
SEGMENTED NEUTROPHILS ABSOLUTE COUNT: 5 K/UL (ref 1.8–7.7)
SODIUM BLD-SCNC: 140 MMOL/L (ref 135–144)
SPECIFIC GRAVITY UA: 1.03 (ref 1.01–1.02)
THYROXINE, FREE: 1.21 NG/DL (ref 0.93–1.7)
TOTAL IRON BINDING CAPACITY: 341 UG/DL (ref 250–450)
TOTAL PROTEIN: 8 G/DL (ref 6.4–8.3)
TRICHOMONAS: ABNORMAL
TRIGL SERPL-MCNC: 112 MG/DL
TSH SERPL DL<=0.05 MIU/L-ACNC: 2.43 MIU/L (ref 0.3–5)
TURBIDITY: ABNORMAL
UNSATURATED IRON BINDING CAPACITY: 276 UG/DL (ref 112–347)
URINE HGB: NEGATIVE
UROBILINOGEN, URINE: NORMAL
VLDLC SERPL CALC-MCNC: NORMAL MG/DL (ref 1–30)
WBC # BLD: 8.2 K/UL (ref 3.5–11)
WBC # BLD: ABNORMAL 10*3/UL
WBC UA: ABNORMAL /HPF (ref 0–4)
YEAST: ABNORMAL

## 2019-10-03 PROCEDURE — 83540 ASSAY OF IRON: CPT

## 2019-10-03 PROCEDURE — 83036 HEMOGLOBIN GLYCOSYLATED A1C: CPT

## 2019-10-03 PROCEDURE — 84443 ASSAY THYROID STIM HORMONE: CPT

## 2019-10-03 PROCEDURE — 85025 COMPLETE CBC W/AUTO DIFF WBC: CPT

## 2019-10-03 PROCEDURE — 36415 COLL VENOUS BLD VENIPUNCTURE: CPT

## 2019-10-03 PROCEDURE — 82728 ASSAY OF FERRITIN: CPT

## 2019-10-03 PROCEDURE — 81001 URINALYSIS AUTO W/SCOPE: CPT

## 2019-10-03 PROCEDURE — 80053 COMPREHEN METABOLIC PANEL: CPT

## 2019-10-03 PROCEDURE — 83550 IRON BINDING TEST: CPT

## 2019-10-03 PROCEDURE — 80061 LIPID PANEL: CPT

## 2019-10-03 PROCEDURE — 84439 ASSAY OF FREE THYROXINE: CPT

## 2019-10-04 ENCOUNTER — OFFICE VISIT (OUTPATIENT)
Dept: INTERNAL MEDICINE | Age: 84
End: 2019-10-04
Payer: MEDICARE

## 2019-10-04 VITALS
RESPIRATION RATE: 16 BRPM | HEIGHT: 60 IN | HEART RATE: 80 BPM | DIASTOLIC BLOOD PRESSURE: 80 MMHG | BODY MASS INDEX: 19.04 KG/M2 | SYSTOLIC BLOOD PRESSURE: 128 MMHG | WEIGHT: 97 LBS

## 2019-10-04 DIAGNOSIS — Z91.81 AT HIGH RISK FOR FALLS: ICD-10-CM

## 2019-10-04 DIAGNOSIS — E78.2 MIXED HYPERLIPIDEMIA: ICD-10-CM

## 2019-10-04 DIAGNOSIS — Z87.891 PERSONAL HISTORY OF TOBACCO USE, PRESENTING HAZARDS TO HEALTH: ICD-10-CM

## 2019-10-04 DIAGNOSIS — Z72.0 TOBACCO ABUSE: ICD-10-CM

## 2019-10-04 DIAGNOSIS — D50.8 IRON DEFICIENCY ANEMIA SECONDARY TO INADEQUATE DIETARY IRON INTAKE: ICD-10-CM

## 2019-10-04 DIAGNOSIS — F32.4 MAJOR DEPRESSIVE DISORDER WITH SINGLE EPISODE, IN PARTIAL REMISSION (HCC): ICD-10-CM

## 2019-10-04 DIAGNOSIS — H61.23 IMPACTED CERUMEN, BILATERAL: ICD-10-CM

## 2019-10-04 DIAGNOSIS — F41.9 ANXIETY: ICD-10-CM

## 2019-10-04 DIAGNOSIS — H91.93 DECREASED HEARING OF BOTH EARS: ICD-10-CM

## 2019-10-04 DIAGNOSIS — K21.9 GASTROESOPHAGEAL REFLUX DISEASE WITHOUT ESOPHAGITIS: ICD-10-CM

## 2019-10-04 DIAGNOSIS — Z00.00 ROUTINE GENERAL MEDICAL EXAMINATION AT A HEALTH CARE FACILITY: Primary | ICD-10-CM

## 2019-10-04 DIAGNOSIS — Z13.6 SCREENING FOR CARDIOVASCULAR CONDITION: ICD-10-CM

## 2019-10-04 DIAGNOSIS — E11.9 CONTROLLED TYPE 2 DIABETES MELLITUS WITHOUT COMPLICATION, WITHOUT LONG-TERM CURRENT USE OF INSULIN (HCC): ICD-10-CM

## 2019-10-04 DIAGNOSIS — I25.10 CORONARY ARTERY DISEASE INVOLVING NATIVE CORONARY ARTERY OF NATIVE HEART, ANGINA PRESENCE UNSPECIFIED: ICD-10-CM

## 2019-10-04 DIAGNOSIS — M15.9 PRIMARY OSTEOARTHRITIS INVOLVING MULTIPLE JOINTS: ICD-10-CM

## 2019-10-04 DIAGNOSIS — J43.8 OTHER EMPHYSEMA (HCC): ICD-10-CM

## 2019-10-04 DIAGNOSIS — E03.8 OTHER SPECIFIED HYPOTHYROIDISM: ICD-10-CM

## 2019-10-04 DIAGNOSIS — F17.210 CIGARETTE SMOKER: ICD-10-CM

## 2019-10-04 PROCEDURE — G8926 SPIRO NO PERF OR DOC: HCPCS | Performed by: INTERNAL MEDICINE

## 2019-10-04 PROCEDURE — G0439 PPPS, SUBSEQ VISIT: HCPCS | Performed by: INTERNAL MEDICINE

## 2019-10-04 PROCEDURE — G8484 FLU IMMUNIZE NO ADMIN: HCPCS | Performed by: INTERNAL MEDICINE

## 2019-10-04 PROCEDURE — 4004F PT TOBACCO SCREEN RCVD TLK: CPT | Performed by: INTERNAL MEDICINE

## 2019-10-04 PROCEDURE — 3288F FALL RISK ASSESSMENT DOCD: CPT | Performed by: INTERNAL MEDICINE

## 2019-10-04 PROCEDURE — G8427 DOCREV CUR MEDS BY ELIG CLIN: HCPCS | Performed by: INTERNAL MEDICINE

## 2019-10-04 PROCEDURE — 1090F PRES/ABSN URINE INCON ASSESS: CPT | Performed by: INTERNAL MEDICINE

## 2019-10-04 PROCEDURE — 99406 BEHAV CHNG SMOKING 3-10 MIN: CPT | Performed by: INTERNAL MEDICINE

## 2019-10-04 PROCEDURE — G8420 CALC BMI NORM PARAMETERS: HCPCS | Performed by: INTERNAL MEDICINE

## 2019-10-04 PROCEDURE — 0518F FALL PLAN OF CARE DOCD: CPT | Performed by: INTERNAL MEDICINE

## 2019-10-04 PROCEDURE — 4040F PNEUMOC VAC/ADMIN/RCVD: CPT | Performed by: INTERNAL MEDICINE

## 2019-10-04 PROCEDURE — G0446 INTENS BEHAVE THER CARDIO DX: HCPCS | Performed by: INTERNAL MEDICINE

## 2019-10-04 PROCEDURE — 99214 OFFICE O/P EST MOD 30 MIN: CPT | Performed by: INTERNAL MEDICINE

## 2019-10-04 PROCEDURE — 3023F SPIROM DOC REV: CPT | Performed by: INTERNAL MEDICINE

## 2019-10-04 PROCEDURE — 1123F ACP DISCUSS/DSCN MKR DOCD: CPT | Performed by: INTERNAL MEDICINE

## 2019-10-04 PROCEDURE — G8599 NO ASA/ANTIPLAT THER USE RNG: HCPCS | Performed by: INTERNAL MEDICINE

## 2019-10-04 PROCEDURE — 69210 REMOVE IMPACTED EAR WAX UNI: CPT | Performed by: INTERNAL MEDICINE

## 2019-10-04 RX ORDER — NITROGLYCERIN 0.4 MG/1
0.4 TABLET SUBLINGUAL EVERY 5 MIN PRN
Qty: 25 TABLET | Refills: 3 | Status: SHIPPED | OUTPATIENT
Start: 2019-10-04 | End: 2021-03-16 | Stop reason: SDUPTHER

## 2019-10-04 RX ORDER — LORAZEPAM 1 MG/1
1 TABLET ORAL EVERY 6 HOURS PRN
Qty: 120 TABLET | Refills: 0 | Status: SHIPPED | OUTPATIENT
Start: 2019-10-04 | End: 2019-11-18 | Stop reason: SDUPTHER

## 2019-10-04 ASSESSMENT — PATIENT HEALTH QUESTIONNAIRE - PHQ9
SUM OF ALL RESPONSES TO PHQ QUESTIONS 1-9: 0
SUM OF ALL RESPONSES TO PHQ QUESTIONS 1-9: 0

## 2019-10-04 ASSESSMENT — LIFESTYLE VARIABLES: HOW OFTEN DO YOU HAVE A DRINK CONTAINING ALCOHOL: 0

## 2019-11-18 DIAGNOSIS — F41.9 ANXIETY: ICD-10-CM

## 2019-11-18 RX ORDER — LORAZEPAM 1 MG/1
1 TABLET ORAL EVERY 6 HOURS PRN
Qty: 120 TABLET | Refills: 0 | Status: SHIPPED | OUTPATIENT
Start: 2019-11-18 | End: 2019-12-16 | Stop reason: SDUPTHER

## 2019-12-16 DIAGNOSIS — F41.9 ANXIETY: ICD-10-CM

## 2019-12-16 RX ORDER — LORAZEPAM 1 MG/1
1 TABLET ORAL EVERY 6 HOURS PRN
Qty: 120 TABLET | Refills: 0 | Status: SHIPPED | OUTPATIENT
Start: 2019-12-16 | End: 2020-01-15 | Stop reason: SDUPTHER

## 2020-01-15 RX ORDER — LORAZEPAM 1 MG/1
1 TABLET ORAL EVERY 6 HOURS PRN
Qty: 120 TABLET | Refills: 0 | Status: SHIPPED | OUTPATIENT
Start: 2020-01-15 | End: 2020-02-10 | Stop reason: SDUPTHER

## 2020-02-03 ENCOUNTER — HOSPITAL ENCOUNTER (OUTPATIENT)
Dept: LAB | Age: 85
Discharge: HOME | End: 2020-02-03
Payer: MEDICARE

## 2020-02-03 ENCOUNTER — OFFICE VISIT (OUTPATIENT)
Dept: INTERNAL MEDICINE | Age: 85
End: 2020-02-03
Payer: MEDICARE

## 2020-02-03 VITALS
TEMPERATURE: 97.7 F | DIASTOLIC BLOOD PRESSURE: 60 MMHG | WEIGHT: 95.8 LBS | HEIGHT: 60 IN | RESPIRATION RATE: 18 BRPM | BODY MASS INDEX: 18.81 KG/M2 | SYSTOLIC BLOOD PRESSURE: 116 MMHG | HEART RATE: 88 BPM

## 2020-02-03 LAB
ABSOLUTE EOS #: 0.13 K/UL (ref 0–0.44)
ABSOLUTE IMMATURE GRANULOCYTE: 0.03 K/UL (ref 0–0.3)
ABSOLUTE LYMPH #: 1.17 K/UL (ref 1.1–3.7)
ABSOLUTE MONO #: 0.69 K/UL (ref 0.1–1.2)
ANION GAP SERPL CALCULATED.3IONS-SCNC: 16 MMOL/L (ref 9–17)
BASOPHILS # BLD: 1 % (ref 0–2)
BASOPHILS ABSOLUTE: 0.11 K/UL (ref 0–0.2)
BUN BLDV-MCNC: 9 MG/DL (ref 8–23)
BUN/CREAT BLD: 16 (ref 9–20)
CALCIUM SERPL-MCNC: 10.1 MG/DL (ref 8.6–10.4)
CHLORIDE BLD-SCNC: 94 MMOL/L (ref 98–107)
CO2: 25 MMOL/L (ref 20–31)
CREAT SERPL-MCNC: 0.58 MG/DL (ref 0.5–0.9)
DIFFERENTIAL TYPE: ABNORMAL
EOSINOPHILS RELATIVE PERCENT: 2 % (ref 1–4)
ESTIMATED AVERAGE GLUCOSE: 105 MG/DL
FERRITIN: 66 UG/L (ref 13–150)
GFR AFRICAN AMERICAN: >60 ML/MIN
GFR NON-AFRICAN AMERICAN: >60 ML/MIN
GFR SERPL CREATININE-BSD FRML MDRD: ABNORMAL ML/MIN/{1.73_M2}
GFR SERPL CREATININE-BSD FRML MDRD: ABNORMAL ML/MIN/{1.73_M2}
GLUCOSE BLD-MCNC: 138 MG/DL (ref 70–99)
HBA1C MFR BLD: 5.3 % (ref 4.8–5.9)
HCT VFR BLD CALC: 40.7 % (ref 36.3–47.1)
HEMOGLOBIN: 13 G/DL (ref 11.9–15.1)
IMMATURE GRANULOCYTES: 0 %
IRON SATURATION: 17 % (ref 20–55)
IRON: 60 UG/DL (ref 37–145)
LYMPHOCYTES # BLD: 15 % (ref 24–43)
MCH RBC QN AUTO: 30.1 PG (ref 25.2–33.5)
MCHC RBC AUTO-ENTMCNC: 31.9 G/DL (ref 25.2–33.5)
MCV RBC AUTO: 94.2 FL (ref 82.6–102.9)
MONOCYTES # BLD: 9 % (ref 3–12)
NRBC AUTOMATED: 0 PER 100 WBC
PDW BLD-RTO: 14.7 % (ref 11.8–14.4)
PLATELET # BLD: 383 K/UL (ref 138–453)
PLATELET ESTIMATE: ABNORMAL
PMV BLD AUTO: 9.3 FL (ref 8.1–13.5)
POTASSIUM SERPL-SCNC: 4.4 MMOL/L (ref 3.7–5.3)
RBC # BLD: 4.32 M/UL (ref 3.95–5.11)
RBC # BLD: ABNORMAL 10*6/UL
SEG NEUTROPHILS: 73 % (ref 36–65)
SEGMENTED NEUTROPHILS ABSOLUTE COUNT: 5.79 K/UL (ref 1.5–8.1)
SODIUM BLD-SCNC: 135 MMOL/L (ref 135–144)
TOTAL IRON BINDING CAPACITY: 345 UG/DL (ref 250–450)
UNSATURATED IRON BINDING CAPACITY: 285 UG/DL (ref 112–347)
WBC # BLD: 7.9 K/UL (ref 3.5–11.3)
WBC # BLD: ABNORMAL 10*3/UL

## 2020-02-03 PROCEDURE — 85025 COMPLETE CBC W/AUTO DIFF WBC: CPT

## 2020-02-03 PROCEDURE — 99214 OFFICE O/P EST MOD 30 MIN: CPT | Performed by: INTERNAL MEDICINE

## 2020-02-03 PROCEDURE — G8420 CALC BMI NORM PARAMETERS: HCPCS | Performed by: INTERNAL MEDICINE

## 2020-02-03 PROCEDURE — 1090F PRES/ABSN URINE INCON ASSESS: CPT | Performed by: INTERNAL MEDICINE

## 2020-02-03 PROCEDURE — 82728 ASSAY OF FERRITIN: CPT

## 2020-02-03 PROCEDURE — G8484 FLU IMMUNIZE NO ADMIN: HCPCS | Performed by: INTERNAL MEDICINE

## 2020-02-03 PROCEDURE — 4040F PNEUMOC VAC/ADMIN/RCVD: CPT | Performed by: INTERNAL MEDICINE

## 2020-02-03 PROCEDURE — 3023F SPIROM DOC REV: CPT | Performed by: INTERNAL MEDICINE

## 2020-02-03 PROCEDURE — 36415 COLL VENOUS BLD VENIPUNCTURE: CPT

## 2020-02-03 PROCEDURE — 83540 ASSAY OF IRON: CPT

## 2020-02-03 PROCEDURE — G8926 SPIRO NO PERF OR DOC: HCPCS | Performed by: INTERNAL MEDICINE

## 2020-02-03 PROCEDURE — 83036 HEMOGLOBIN GLYCOSYLATED A1C: CPT

## 2020-02-03 PROCEDURE — 99406 BEHAV CHNG SMOKING 3-10 MIN: CPT | Performed by: INTERNAL MEDICINE

## 2020-02-03 PROCEDURE — 3288F FALL RISK ASSESSMENT DOCD: CPT | Performed by: INTERNAL MEDICINE

## 2020-02-03 PROCEDURE — 0518F FALL PLAN OF CARE DOCD: CPT | Performed by: INTERNAL MEDICINE

## 2020-02-03 PROCEDURE — 1123F ACP DISCUSS/DSCN MKR DOCD: CPT | Performed by: INTERNAL MEDICINE

## 2020-02-03 PROCEDURE — 4004F PT TOBACCO SCREEN RCVD TLK: CPT | Performed by: INTERNAL MEDICINE

## 2020-02-03 PROCEDURE — 80048 BASIC METABOLIC PNL TOTAL CA: CPT

## 2020-02-03 PROCEDURE — G8427 DOCREV CUR MEDS BY ELIG CLIN: HCPCS | Performed by: INTERNAL MEDICINE

## 2020-02-03 PROCEDURE — 83550 IRON BINDING TEST: CPT

## 2020-02-03 RX ORDER — ALBUTEROL SULFATE 90 UG/1
2 AEROSOL, METERED RESPIRATORY (INHALATION) EVERY 6 HOURS PRN
Qty: 1 INHALER | Refills: 3 | Status: SHIPPED | OUTPATIENT
Start: 2020-02-03

## 2020-02-03 NOTE — PROGRESS NOTES
Lia Xiao received counseling on the following healthy behaviors: nutrition, exercise and tobacco cessation  Reviewed prior labs and health maintenance  Continue current medications, diet and exercise. Discussed use, benefit, and side effects of prescribed medications. Barriers to medication compliance addressed. Patient given educational materials - see patient instructions  Was a self-tracking handout given in paper form or via Sharecarehart? Yes    Requested Prescriptions     Signed Prescriptions Disp Refills    albuterol sulfate HFA (PROVENTIL HFA) 108 (90 Base) MCG/ACT inhaler 1 Inhaler 3     Sig: Inhale 2 puffs into the lungs every 6 hours as needed for Wheezing or Shortness of Breath       All patient questions answered. Patient voiced understanding. Quality Measures    Body mass index is 18.71 kg/m². Elevated. Weight control plan discussed: Healthy diet and regular exercise. BP: 116/60. Blood pressure is normal. Treatment plan: See main progress note    Fall Risk 10/4/2019 9/4/2018 2/16/2018 4/7/2017 9/28/2016 5/6/2014   2 or more falls in past year? yes yes no no no yes   Fall with injury in past year? yes no yes no no no     The patient has a history of falls. I did not - not indicated , complete a risk assessment for falls. See progress note for plan, if risk assessment completed. Lab Results   Component Value Date    LDLCHOLESTEROL 96 10/03/2019    (goal LDL reduction with dx if diabetes is 50% LDL reduction)    PHQ Scores 10/4/2019 12/21/2018 9/4/2018 5/18/2018 4/7/2017 9/28/2016 2/3/2014   PHQ2 Score 0 2 4 0 0 0 0   PHQ9 Score 0 2 13 0 0 0 0     See progress note for plan, if depression exists. Interpretation of Total Score: Major depression if the answer to questions 1 or 2 and 5 or more of questions 1 to 9 are at least [facility] than half the days. \"  Other depressive syndrome if questions 1 or 2 and two, three, or four of questions 1 to 9 are at least [facility] than half the days\"   Depression Severity: 5-9 = Mild depression, 10-14 = Moderate depression, 15-19 = Moderately severe depression, 20-27 = Severe depression

## 2020-02-03 NOTE — PATIENT INSTRUCTIONS
good, quick way to make sure that you have a balanced meal. It also helps you spread carbs throughout the day. ? Divide your plate by types of foods. Put non-starchy vegetables on half the plate, meat or other protein food on one-quarter of the plate, and a grain or starchy vegetable in the final quarter of the plate. To this you can add a small piece of fruit and 1 cup of milk or yogurt, depending on how many carbs you are supposed to eat at a meal.  · Try to eat about the same amount of carbs at each meal. Do not \"save up\" your daily allowance of carbs to eat at one meal.  · Proteins have very little or no carbs per serving. Examples of proteins are beef, chicken, turkey, fish, eggs, tofu, cheese, cottage cheese, and peanut butter. A serving size of meat is 3 ounces, which is about the size of a deck of cards. Examples of meat substitute serving sizes (equal to 1 ounce of meat) are 1/4 cup of cottage cheese, 1 egg, 1 tablespoon of peanut butter, and ½ cup of tofu. How can you eat out and still eat healthy? · Learn to estimate the serving sizes of foods that have carbohydrate. If you measure food at home, it will be easier to estimate the amount in a serving of restaurant food. · If the meal you order has too much carbohydrate (such as potatoes, corn, or baked beans), ask to have a low-carbohydrate food instead. Ask for a salad or green vegetables. · If you use insulin, check your blood sugar before and after eating out to help you plan how much to eat in the future. · If you eat more carbohydrate at a meal than you had planned, take a walk or do other exercise. This will help lower your blood sugar. What else should you know? · Limit saturated fat, such as the fat from meat and dairy products. This is a healthy choice because people who have diabetes are at higher risk of heart disease. So choose lean cuts of meat and nonfat or low-fat dairy products.  Use olive or canola oil instead of butter or shortening when cooking. · Don't skip meals. Your blood sugar may drop too low if you skip meals and take insulin or certain medicines for diabetes. · Check with your doctor before you drink alcohol. Alcohol can cause your blood sugar to drop too low. Alcohol can also cause a bad reaction if you take certain diabetes medicines. Follow-up care is a key part of your treatment and safety. Be sure to make and go to all appointments, and call your doctor if you are having problems. It's also a good idea to know your test results and keep a list of the medicines you take. Where can you learn more? Go to https://chpepiceweb.[email or web address] org and sign in to your Flutter account. Enter Q666 in the MediaHound box to learn more about \"Learning About Diabetes Food Guidelines. \"     If you do not have an account, please click on the \"Sign Up Now\" link. Current as of: April 16, 2019  Content Version: 12.3  © 5229-5616 [employer]. Care instructions adapted under license by [facility] ([facility]). If you have questions about a medical condition or this instruction, always ask your healthcare professional. [address] any warranty or liability for your use of this information. Patient Education        Learning About Diabetes and Exercise  Can you exercise if you have diabetes? When you have diabetes, it's important to get regular exercise. This helps control your blood sugar level. You can still play sports, run, ride a bike, go swimming, and do other activities when you have diabetes. How can exercise help you manage diabetes? Your body turns the food you eat into glucose, a type of sugar. You need this sugar for energy. When you have diabetes, the sugar builds up in your blood. But when you exercise, your body uses sugar. This helps keep it from building up in your blood and results in lower blood sugar and better control of diabetes. Exercise may help you in other ways too.  It can help you reach and stay at a healthy weight. It also helps improve blood pressure and cholesterol, which can reduce the risk of heart disease. Exercise can make you feel stronger and happier. It can help you relax and sleep better, and give you confidence in other things you do. How can you exercise safely? Before you start a new exercise program, talk to your doctor about how and when to exercise. You may need to have a medical exam and tests before you begin. Some types of exercise can be harmful if your diabetes is causing other problems, such as problems with your feet. Your doctor can tell you what types of exercise are good choices for you. These tips can help you exercise safely when you have diabetes. If your diabetes is controlled by diet or medicine that doesn't lower your blood sugar, you don't need to eat a snack before you exercise. · Check your blood sugar before you exercise. And be careful about what you eat. ? If your blood sugar is less than 100, eat a carbohydrate snack before you exercise. ? Be careful when you exercise if your blood sugar is over 300. High blood sugar can make you dehydrated. And that makes your blood sugar levels go even higher. If you have ketones in your blood or urine and your blood sugar is over 300, do not exercise. · Don't try to do too much at first. Build up your exercise program bit by bit. Try to get at least 30 minutes of exercise on most days of the week. Walking is a good choice. You also may want to do other activities, such as riding a bike or swimming. You might try running or gardening. Try to include muscle-strengthening exercises at least 2 times a week. These exercises include push-ups and weight training. You can also use rubber tubing or stretch bands. You stretch or pull the tubing or band to build muscle strength. If you want to exercise more, slowly increase how hard or long you exercise.   · You may get symptoms of low blood sugar during breathes in your smoke. · Their heart, lung, and cancer risks drop, much like yours. · They are sick less. For babies and small children, living smoke-free means they're less likely to have ear infections, pneumonia, and bronchitis. · If you're a woman who is or will be pregnant someday, quitting smoking means a healthier . · Children who are close to you are less likely to become adult smokers. Where can you learn more? Go to https://[email or web address] Grouppe[email or web address].[employer]. org and sign in to your Wormhole account. Enter [id number] [id number] in the Outline App box to learn more about \"Learning About Benefits From Quitting Smoking. \"     If you do not have an account, please click on the \"Sign Up Now\" link. Current as of: 2019  Content Version: 12.3  © 0892-3495 [employer]. Care instructions adapted under license by [facility] ([facility]). If you have questions about a medical condition or this instruction, always ask your healthcare professional. [place] [address] any warranty or liability for your use of this information. Patient Education        Stopping Smoking: Care Instructions  Your Care Instructions  Cigarette smokers crave the nicotine in cigarettes. Giving it up is much harder than simply changing a habit. Your body has to stop craving the nicotine. It is hard to quit, but you can do it. There are many tools that people use to quit smoking. You may find that combining tools works best for you. There are several steps to quitting. First you get ready to quit. Then you get support to help you. After that, you learn new skills and behaviors to become a nonsmoker. For many people, a necessary step is getting and using medicine. Your doctor will help you set up the plan that best meets your needs. You may want to attend a smoking cessation program to help you quit smoking. When you choose a program, look for one that has proven success. Ask your doctor for ideas.  You will smoke.  ? Change your daily routine. Take a different route to work or eat a meal in a different place. · Cut down on stress. Calm yourself or release tension by doing an activity you enjoy, such as reading a book, taking a hot bath, or gardening. · Talk to your doctor or pharmacist about nicotine replacement therapy, which replaces the nicotine in your body. You still get nicotine but you do not use tobacco. Nicotine replacement products help you slowly reduce the amount of nicotine you need. These products come in several forms, many of them available over-the-counter:  ? Nicotine patches  ? Nicotine gum and lozenges  ? Nicotine inhaler  · Ask your doctor about bupropion (Wellbutrin) or varenicline (Chantix), which are prescription medicines. They do not contain nicotine. They help you by reducing withdrawal symptoms, such as stress and anxiety. · Some people find hypnosis, acupuncture, and massage helpful for ending the smoking habit. · Eat a healthy diet and get regular exercise. Having healthy habits will help your body move past its craving for nicotine. · Be prepared to keep trying. Most people are not successful the first few times they try to quit. Do not get mad at yourself if you smoke again. Make a list of things you learned and think about when you want to try again, such as next week, next month, or next year. Where can you learn more? Go to https://ePetWorlddedrickReach.ly.[email or web address] org and sign in to your [employer] account. Enter D747 in the [facility] box to learn more about \"Stopping Smoking: Care Instructions. \"     If you do not have an account, please click on the \"Sign Up Now\" link. Current as of: July 4, 2019  Content Version: 12.3  © 3801-8799 Healthwise, [employer]. Care instructions adapted under license by [facility] ([facility]).  If you have questions about a medical condition or this instruction, always ask your healthcare professional. [employer] disclaims any warranty or liability for your use of this information.

## 2020-02-03 NOTE — PROGRESS NOTES
external eye, conjunctiva, lids cornea, BLAIR. Ears: Normal TM's bilaterally. Normal auditory canals and external ears. Non-tender. Nose: Normal external nose, mucus membranes and septum. Pharynx: Dental Hygiene adequate. Normal buccal mucosa. Normal pharynx. Neck / Thyroid: Supple, no masses, nodes, nodules or enlargement. Lungs: CTA without rales, rhonchi, or wheezing. Diminished breath sounds throughout all fields  Heart: regular rate and rhythm, S1, S2 normal, no murmur, click, rub or gallop No S3 or S4. Abdomen: Non-obese, soft, non-distended, non-tender, normal active bowel sounds, no masses palpated and no hepatosplenomegaly  Extremities: There is no clubbing, cyanosis, or edema in any of the extremities. Neurologic:  cranial nerves II-XII are grossly intact  Osteopathic Structural Examination: Patient was examined in the seated and standing positions. There was full range of motion in the cervical, thoracic, and lumbar spines. No scoliosis. No change in thoracic kyphosis or lumbar lordosis. No increased paravertebral muscle tenderness. ASSESSMENT/PLAN:    1. Controlled type 2 diabetes mellitus without complication, without long-term current use of insulin ([facility])  - Her most recent HbA1c was 5.3%, which is lower than the target value of 6.5% or lower. We are not going to make changes to her diabetic medications. She was advised to continue to watch her diet and exercise to keep her diabetes under control.    - Basic Metabolic Panel, Fasting; Future  - Hemoglobin A1C; Future  -  DIABETES FOOT EXAM    2. Slow transit constipation, worse  3. Nausea, worse  - We talked about eating a more consistent diet and speaking with [employer] about her food preferences and maybe she'll get more things that she likes to eat    4. Mixed hyperlipidemia, controlled  - She will continue to watch her diet and exercise    5. Other specified hypothyroidism, stable  - We will continue to monitor     6.  Coronary

## 2020-02-10 RX ORDER — LORAZEPAM 1 MG/1
1 TABLET ORAL EVERY 6 HOURS PRN
Qty: 120 TABLET | Refills: 0 | Status: SHIPPED | OUTPATIENT
Start: 2020-02-10 | End: 2020-03-11 | Stop reason: SDUPTHER

## 2020-03-11 RX ORDER — LORAZEPAM 1 MG/1
1 TABLET ORAL EVERY 6 HOURS PRN
Qty: 120 TABLET | Refills: 0 | Status: SHIPPED | OUTPATIENT
Start: 2020-03-11 | End: 2020-04-10 | Stop reason: SDUPTHER

## 2020-04-10 RX ORDER — LORAZEPAM 1 MG/1
1 TABLET ORAL EVERY 6 HOURS PRN
Qty: 120 TABLET | Refills: 0 | Status: SHIPPED | OUTPATIENT
Start: 2020-04-10 | End: 2020-05-08 | Stop reason: SDUPTHER

## 2020-04-10 NOTE — TELEPHONE ENCOUNTER
OARRS checked today    Controlled Substance Monitoring:    Acute and Chronic Pain Monitoring:   RX Monitoring 4/10/2020   Attestation -   Acute Pain Prescriptions -   Periodic Controlled Substance Monitoring No signs of potential drug abuse or diversion identified.

## 2020-04-25 RX ORDER — SERTRALINE HYDROCHLORIDE 25 MG/1
12.5 TABLET, FILM COATED ORAL DAILY
Qty: 15 TABLET | Refills: 11 | Status: SHIPPED
Start: 2020-04-25 | End: 2020-08-17

## 2020-05-08 RX ORDER — LORAZEPAM 1 MG/1
1 TABLET ORAL EVERY 6 HOURS PRN
Qty: 120 TABLET | Refills: 0 | Status: SHIPPED | OUTPATIENT
Start: 2020-05-08 | End: 2020-06-08 | Stop reason: SDUPTHER

## 2020-05-18 ENCOUNTER — VIRTUAL VISIT (OUTPATIENT)
Dept: INTERNAL MEDICINE | Age: 85
End: 2020-05-18
Payer: MEDICARE

## 2020-05-18 VITALS — WEIGHT: 95 LBS | HEIGHT: 60 IN | RESPIRATION RATE: 32 BRPM | BODY MASS INDEX: 18.65 KG/M2

## 2020-05-18 PROCEDURE — G8427 DOCREV CUR MEDS BY ELIG CLIN: HCPCS | Performed by: INTERNAL MEDICINE

## 2020-05-18 PROCEDURE — 1090F PRES/ABSN URINE INCON ASSESS: CPT | Performed by: INTERNAL MEDICINE

## 2020-05-18 PROCEDURE — 1123F ACP DISCUSS/DSCN MKR DOCD: CPT | Performed by: INTERNAL MEDICINE

## 2020-05-18 PROCEDURE — 3288F FALL RISK ASSESSMENT DOCD: CPT | Performed by: INTERNAL MEDICINE

## 2020-05-18 PROCEDURE — 99406 BEHAV CHNG SMOKING 3-10 MIN: CPT | Performed by: INTERNAL MEDICINE

## 2020-05-18 PROCEDURE — 99214 OFFICE O/P EST MOD 30 MIN: CPT | Performed by: INTERNAL MEDICINE

## 2020-05-18 PROCEDURE — 0518F FALL PLAN OF CARE DOCD: CPT | Performed by: INTERNAL MEDICINE

## 2020-05-18 PROCEDURE — 4040F PNEUMOC VAC/ADMIN/RCVD: CPT | Performed by: INTERNAL MEDICINE

## 2020-05-18 SDOH — ECONOMIC STABILITY: INCOME INSECURITY: HOW HARD IS IT FOR YOU TO PAY FOR THE VERY BASICS LIKE FOOD, HOUSING, MEDICAL CARE, AND HEATING?: NOT HARD AT ALL

## 2020-05-18 SDOH — ECONOMIC STABILITY: TRANSPORTATION INSECURITY
IN THE PAST 12 MONTHS, HAS LACK OF TRANSPORTATION KEPT YOU FROM MEETINGS, WORK, OR FROM GETTING THINGS NEEDED FOR DAILY LIVING?: NO

## 2020-05-18 SDOH — ECONOMIC STABILITY: FOOD INSECURITY: WITHIN THE PAST 12 MONTHS, YOU WORRIED THAT YOUR FOOD WOULD RUN OUT BEFORE YOU GOT MONEY TO BUY MORE.: NEVER TRUE

## 2020-05-18 SDOH — ECONOMIC STABILITY: TRANSPORTATION INSECURITY
IN THE PAST 12 MONTHS, HAS THE LACK OF TRANSPORTATION KEPT YOU FROM MEDICAL APPOINTMENTS OR FROM GETTING MEDICATIONS?: NO

## 2020-05-18 SDOH — ECONOMIC STABILITY: FOOD INSECURITY: WITHIN THE PAST 12 MONTHS, THE FOOD YOU BOUGHT JUST DIDN'T LAST AND YOU DIDN'T HAVE MONEY TO GET MORE.: NEVER TRUE

## 2020-05-18 NOTE — PROGRESS NOTES
DR. Jensen Shoulder - TELEHEALTH PROGRESS NOTE    CHIEF COMPLAINT/HISTORY OF CHIEF COMPLAINT: This 80 y.o.  female presents via Telehealth today for ongoing evaluation and management of her diabetes mellitus type 2, hyperlipidemia, hypothyroidism, chronic obstructive pulmonary disease, osteoarthritis, gastroesophageal reflux disease, osteoarthritis, iron deficiency anemia, anxiety, depression, and tobacco abuse. She has been having more headaches lately. They come and go. The headaches are \"on the top of her head. \" She has been taking Ativan and Tylenol for them, which does help them. It takes about an hour for the headache to go away. She is unable to state how frequently they are happening. She tries to control her elevated fasting glucose with diet and exercise. Her family reports her sugars running between 110 and 130. She takes Ativan for anxiety, which does help. She is also on Zoloft for depression, which is helping. She tries to control her hyperlipidemia with diet and exercise. Her gastroesophageal reflux disease has been stable. She has chronic obstructive pulmonary disease and is supposed to use an albuterol inhaler as needed. She has been more short of breath lately, but she hasn't been using her inhaler because she doesn't think she needs it. Her constipation has been more improved lately.  She is still smoking about one pack of cigarettes a day. Otherwise she seems to be doing fairly well and denies any other complaints.        ALLERGIES/INTOLERANCES:   Allergies   Allergen Reactions    Acyclovir And Related Diarrhea and Nausea Only    Albuterol     Amitriptyline      Dizziness      Asa [Aspirin]      Stomach upset      Betadine [Povidone Iodine]     Biaxin [Clarithromycin] Nausea Only    Buspar [Buspirone]     Cefuroxime Axetil      Diarrhea      Celestone [Betamethasone]     Codeine     Darvocet A500 [Propoxyphene N-Acetaminophen]     Dicyclomine Hcl     Doxycycline Nausea Only    Esomeprazole Magnesium     Fluconazole     Lidex [Fluocinolone]     Neomycin     Pcn [Penicillins] Hives    Protonix [Pantoprazole]     Quinolones Hives    Statins [Statins]      Leg cramps      Tape [Adhesive Tape]     Tramadol      \"makes her walk into walls\"    Valium     Vicodin [Hydrocodone-Acetaminophen]     Zithromax [Azithromycin]      Nausea      Hydrocodone Nausea Only       MEDICATIONS:   Outpatient Medications Marked as Taking for the 5/18/20 encounter (Virtual Visit) with Candance Castles, DO   Medication Sig Dispense Refill    LORazepam (ATIVAN) 1 MG tablet Take 1 tablet by mouth every 6 hours as needed for Anxiety for up to 30 days. 120 tablet 0    sertraline (ZOLOFT) 25 MG tablet Take 0.5 tablets by mouth daily 15 tablet 11    albuterol sulfate HFA (PROVENTIL HFA) 108 (90 Base) MCG/ACT inhaler Inhale 2 puffs into the lungs every 6 hours as needed for Wheezing or Shortness of Breath 1 Inhaler 3    nitroGLYCERIN (NITROSTAT) 0.4 MG SL tablet Place 1 tablet under the tongue every 5 minutes as needed for Chest pain 25 tablet 3    ferrous gluconate 324 (37.5 Fe) MG TABS Take 1 tablet by mouth 2 times daily (Patient taking differently: Take 324 mg by mouth 2 times daily Family states she is taking this daily not BID) 60 tablet 11    cetirizine (ZYRTEC) 10 MG tablet Take 10 mg by mouth daily      Fiber POWD Take by mouth nightly      Multiple Vitamins-Minerals (I-DANIA) TABS Take 1 tablet by mouth 2 times daily      Cyanocobalamin (B-12) 1000 MCG CAPS Take 1 tablet by mouth daily      Propylene Glycol (SYSTANE BALANCE OP) Apply 1 drop to eye 3 times daily       acetaminophen (TYLENOL) 500 MG tablet Take 500 mg by mouth every 6 hours as needed for Pain.  Probiotic Product (PROBIOTIC DAILY PO) Take 1 tablet by mouth daily TruBiotics      Multiple Vitamins-Minerals (MULTIVITAMIN & MINERAL PO) Take 1 tablet by mouth daily.       Cholecalciferol (VITAMIN D3) 1000 UNITS TABS Take native coronary artery of native heart, angina presence unspecified, stable  - We will continue to monitor     6. Other emphysema ([facility] [address] stable  - We will continue to monitor  - We encouraged her to use her albuterol inhaler for the increased shortness of breath  - TX TOBACCO USE CESSATION INTERMEDIATE 3-10 MINUTES    7. Gastroesophageal reflux disease without esophagitis, stable  - We will continue to monitor     8. Major depressive disorder with single episode, in partial remission ([address] stable  - She will continue to take Zoloft    9. Anxiety, stable  - She will continue to use the Ativan as needed    10. Primary osteoarthritis involving multiple joints, stable  - We will continue to monitor     11. Tobacco abuse  12. Cigarette smoker  - She was advised to quit smoking immediately and completely. The risks of continued smoking were reviewed with her and she did verbalize understanding.  - Tobacco Cessation Counseling: Patient advised about behavior change, including information about personal health harms, usage of appropriate cessation measures and benefits of cessation. Time spent (minutes): 5 minutes   - TX TOBACCO USE CESSATION INTERMEDIATE 3-10 MINUTES      Orders Placed This Encounter   Procedures    TX TOBACCO USE CESSATION INTERMEDIATE 3-10 MINUTES       Requested Prescriptions      No prescriptions requested or ordered in this encounter       She will do the lab work we ordered for today, next time, since the orders are still valid. Return in about 3 months (around 8/18/2020). Pursuant to the emergency declaration under the [facility] waiver authority and the [employer] General Act, this TelehHealth visit was conducted, with patient's consent, to reduce the patient's risk of exposure to COVID-19 and provide continuity of care for an established patient.     Services were provided through a video synchronous

## 2020-05-18 NOTE — PATIENT INSTRUCTIONS
good, quick way to make sure that you have a balanced meal. It also helps you spread carbs throughout the day. ? Divide your plate by types of foods. Put non-starchy vegetables on half the plate, meat or other protein food on one-quarter of the plate, and a grain or starchy vegetable in the final quarter of the plate. To this you can add a small piece of fruit and 1 cup of milk or yogurt, depending on how many carbs you are supposed to eat at a meal.  · Try to eat about the same amount of carbs at each meal. Do not \"save up\" your daily allowance of carbs to eat at one meal.  · Proteins have very little or no carbs per serving. Examples of proteins are beef, chicken, turkey, fish, eggs, tofu, cheese, cottage cheese, and peanut butter. A serving size of meat is 3 ounces, which is about the size of a deck of cards. Examples of meat substitute serving sizes (equal to 1 ounce of meat) are 1/4 cup of cottage cheese, 1 egg, 1 tablespoon of peanut butter, and ½ cup of tofu. How can you eat out and still eat healthy? · Learn to estimate the serving sizes of foods that have carbohydrate. If you measure food at home, it will be easier to estimate the amount in a serving of restaurant food. · If the meal you order has too much carbohydrate (such as potatoes, corn, or baked beans), ask to have a low-carbohydrate food instead. Ask for a salad or green vegetables. · If you use insulin, check your blood sugar before and after eating out to help you plan how much to eat in the future. · If you eat more carbohydrate at a meal than you had planned, take a walk or do other exercise. This will help lower your blood sugar. What else should you know? · Limit saturated fat, such as the fat from meat and dairy products. This is a healthy choice because people who have diabetes are at higher risk of heart disease. So choose lean cuts of meat and nonfat or low-fat dairy products.  Use olive or canola oil instead of butter or shortening when cooking. · Don't skip meals. Your blood sugar may drop too low if you skip meals and take insulin or certain medicines for diabetes. · Check with your doctor before you drink alcohol. Alcohol can cause your blood sugar to drop too low. Alcohol can also cause a bad reaction if you take certain diabetes medicines. Follow-up care is a key part of your treatment and safety. Be sure to make and go to all appointments, and call your doctor if you are having problems. It's also a good idea to know your test results and keep a list of the medicines you take. Where can you learn more? Go to https://chpepiceweb.Platfora. org and sign in to your [employer] account. Enter H882 in the [employer] box to learn more about \"Learning About Diabetes Food Guidelines. \"     If you do not have an account, please click on the \"Sign Up Now\" link. Current as of: December 19, 2019Content Version: 12.4  © 1056-4012 Healthwise, [employer]. Care instructions adapted under license by [facility] ([facility]). If you have questions about a medical condition or this instruction, always ask your healthcare professional. [address] any warranty or liability for your use of this information. Patient Education        Learning About Diabetes and Exercise  Can you exercise if you have diabetes? When you have diabetes, it's important to get regular exercise. This helps control your blood sugar level. You can still play sports, run, ride a bike, go swimming, and do other activities when you have diabetes. How can exercise help you manage diabetes? Your body turns the food you eat into glucose, a type of sugar. You need this sugar for energy. When you have diabetes, the sugar builds up in your blood. But when you exercise, your body uses sugar. This helps keep it from building up in your blood and results in lower blood sugar and better control of diabetes. Exercise may help you in other ways too.  It can help you reach and stay at a healthy weight. It also helps improve blood pressure and cholesterol, which can reduce the risk of heart disease. Exercise can make you feel stronger and happier. It can help you relax and sleep better, and give you confidence in other things you do. How can you exercise safely? Before you start a new exercise program, talk to your doctor about how and when to exercise. You may need to have a medical exam and tests before you begin. Some types of exercise can be harmful if your diabetes is causing other problems, such as problems with your feet. Your doctor can tell you what types of exercise are good choices for you. These tips can help you exercise safely when you have diabetes. If your diabetes is controlled by diet or medicine that doesn't lower your blood sugar, you don't need to eat a snack before you exercise. · Check your blood sugar before you exercise. And be careful about what you eat. ? If your blood sugar is less than 100, eat a carbohydrate snack before you exercise. ? Be careful when you exercise if your blood sugar is over 300. High blood sugar can make you dehydrated. And that makes your blood sugar levels go even higher. If you have ketones in your blood or urine and your blood sugar is over 300, do not exercise. · Don't try to do too much at first. Build up your exercise program bit by bit. Try to get at least 30 minutes of exercise on most days of the week. Walking is a good choice. You also may want to do other activities, such as riding a bike or swimming. You might try running or gardening. Try to include muscle-strengthening exercises at least 2 times a week. These exercises include push-ups and weight training. You can also use rubber tubing or stretch bands. You stretch or pull the tubing or band to build muscle strength. If you want to exercise more, slowly increase how hard or long you exercise.   · You may get symptoms of low blood sugar during exercise or up to 24 hours later. Some symptoms of low blood sugar, such as sweating, a fast heartbeat, or feeling tired, can be confused with what can happen anytime you exercise. Other symptoms may include feeling anxious, dizzy, weak, or shaky. So it's a good idea to check your blood sugar again. · You can treat low blood sugar by eating or drinking something that has 15 grams of carbohydrate. These should be quick-sugar foods. Quick-sugar foods such as glucose tablets, table sugar, honey, fruit juice, regular (not diet) soda pop, or hard candy can help raise blood sugar. Check your blood sugar level again 15 minutes after having a quick-sugar food to make sure your level is getting back to your target range. · Drink plenty of water before, during, and after you exercise. · Wear medical alert jewelry that says you have diabetes. You can buy this at most drugstores. · Pay attention to your body. If you are used to exercise and notice that you can't do as much as usual, talk to your doctor. Follow-up care is a key part of your treatment and safety. Be sure to make and go to all appointments, and call your doctor if you are having problems. It's also a good idea to know your test results and keep a list of the medicines you take. Where can you learn more? Go to https://Seismotechzonia.[employer]. org and sign in to your [employer] account. Enter S677 in the [facility] box to learn more about \"Learning About Diabetes and Exercise. \"     If you do not have an account, please click on the \"Sign Up Now\" link. Current as of: December 19, 2019Content Version: 12.4  © 4576-1789 Healthwise, Incorporated. Care instructions adapted under license by [employer]. If you have questions about a medical condition or this instruction, always ask your healthcare professional. [address]  any warranty or liability for your use of this information.          Patient Education        Learning any warranty or liability for your use of this information.

## 2020-06-08 RX ORDER — LORAZEPAM 1 MG/1
1 TABLET ORAL EVERY 6 HOURS PRN
Qty: 120 TABLET | Refills: 0 | Status: SHIPPED | OUTPATIENT
Start: 2020-06-08 | End: 2020-07-09 | Stop reason: SDUPTHER

## 2020-07-09 RX ORDER — LORAZEPAM 1 MG/1
1 TABLET ORAL EVERY 6 HOURS PRN
Qty: 120 TABLET | Refills: 0 | Status: SHIPPED | OUTPATIENT
Start: 2020-07-09 | End: 2020-08-09 | Stop reason: SDUPTHER

## 2020-07-09 NOTE — TELEPHONE ENCOUNTER
[place]AR[place] checked today    Controlled Substance Monitoring:    Acute and Chronic Pain Monitoring:   RX Monitoring 7/9/2020   Attestation -   Acute Pain Prescriptions -   Periodic Controlled Substance Monitoring No signs of potential drug abuse or diversion identified.

## 2020-08-09 RX ORDER — LORAZEPAM 1 MG/1
1 TABLET ORAL EVERY 6 HOURS PRN
Qty: 120 TABLET | Refills: 0 | Status: SHIPPED | OUTPATIENT
Start: 2020-08-09 | End: 2020-09-04 | Stop reason: SDUPTHER

## 2020-08-17 ENCOUNTER — HOSPITAL ENCOUNTER (OUTPATIENT)
Dept: LAB | Age: 85
Discharge: HOME | End: 2020-08-17
Payer: MEDICARE

## 2020-08-17 ENCOUNTER — OFFICE VISIT (OUTPATIENT)
Dept: INTERNAL MEDICINE | Age: 85
End: 2020-08-17
Payer: MEDICARE

## 2020-08-17 VITALS
DIASTOLIC BLOOD PRESSURE: 88 MMHG | BODY MASS INDEX: 19.56 KG/M2 | RESPIRATION RATE: 16 BRPM | SYSTOLIC BLOOD PRESSURE: 130 MMHG | WEIGHT: 97 LBS | TEMPERATURE: 97.2 F | HEART RATE: 80 BPM | HEIGHT: 59 IN

## 2020-08-17 LAB
ANION GAP SERPL CALCULATED.3IONS-SCNC: 11 MMOL/L (ref 9–17)
BUN BLDV-MCNC: 16 MG/DL (ref 8–23)
BUN/CREAT BLD: 24 (ref 9–20)
CALCIUM SERPL-MCNC: 10.1 MG/DL (ref 8.6–10.4)
CHLORIDE BLD-SCNC: 99 MMOL/L (ref 98–107)
CO2: 27 MMOL/L (ref 20–31)
CREAT SERPL-MCNC: 0.68 MG/DL (ref 0.5–0.9)
ESTIMATED AVERAGE GLUCOSE: 120 MG/DL
GFR AFRICAN AMERICAN: >60 ML/MIN
GFR NON-AFRICAN AMERICAN: >60 ML/MIN
GFR SERPL CREATININE-BSD FRML MDRD: ABNORMAL ML/MIN/{1.73_M2}
GFR SERPL CREATININE-BSD FRML MDRD: ABNORMAL ML/MIN/{1.73_M2}
GLUCOSE FASTING: 118 MG/DL (ref 70–99)
HBA1C MFR BLD: 5.8 % (ref 4.8–5.9)
POTASSIUM SERPL-SCNC: 4.2 MMOL/L (ref 3.7–5.3)
SODIUM BLD-SCNC: 137 MMOL/L (ref 135–144)

## 2020-08-17 PROCEDURE — 36415 COLL VENOUS BLD VENIPUNCTURE: CPT

## 2020-08-17 PROCEDURE — 3023F SPIROM DOC REV: CPT | Performed by: INTERNAL MEDICINE

## 2020-08-17 PROCEDURE — 99406 BEHAV CHNG SMOKING 3-10 MIN: CPT | Performed by: INTERNAL MEDICINE

## 2020-08-17 PROCEDURE — 80048 BASIC METABOLIC PNL TOTAL CA: CPT

## 2020-08-17 PROCEDURE — G8427 DOCREV CUR MEDS BY ELIG CLIN: HCPCS | Performed by: INTERNAL MEDICINE

## 2020-08-17 PROCEDURE — 99214 OFFICE O/P EST MOD 30 MIN: CPT | Performed by: INTERNAL MEDICINE

## 2020-08-17 PROCEDURE — 4040F PNEUMOC VAC/ADMIN/RCVD: CPT | Performed by: INTERNAL MEDICINE

## 2020-08-17 PROCEDURE — 1090F PRES/ABSN URINE INCON ASSESS: CPT | Performed by: INTERNAL MEDICINE

## 2020-08-17 PROCEDURE — 83036 HEMOGLOBIN GLYCOSYLATED A1C: CPT

## 2020-08-17 PROCEDURE — 0518F FALL PLAN OF CARE DOCD: CPT | Performed by: INTERNAL MEDICINE

## 2020-08-17 PROCEDURE — G8926 SPIRO NO PERF OR DOC: HCPCS | Performed by: INTERNAL MEDICINE

## 2020-08-17 PROCEDURE — 3288F FALL RISK ASSESSMENT DOCD: CPT | Performed by: INTERNAL MEDICINE

## 2020-08-17 PROCEDURE — G8420 CALC BMI NORM PARAMETERS: HCPCS | Performed by: INTERNAL MEDICINE

## 2020-08-17 PROCEDURE — 1123F ACP DISCUSS/DSCN MKR DOCD: CPT | Performed by: INTERNAL MEDICINE

## 2020-08-17 PROCEDURE — 4004F PT TOBACCO SCREEN RCVD TLK: CPT | Performed by: INTERNAL MEDICINE

## 2020-08-17 RX ORDER — OXYBUTYNIN CHLORIDE 10 MG/1
10 TABLET, EXTENDED RELEASE ORAL NIGHTLY
Qty: 30 TABLET | Refills: 11 | Status: SHIPPED
Start: 2020-08-17 | End: 2021-03-16

## 2020-08-17 RX ORDER — DOXYCYCLINE HYCLATE 50 MG/1
324 CAPSULE, GELATIN COATED ORAL
COMMUNITY
End: 2022-01-01 | Stop reason: ALTCHOICE

## 2020-08-17 RX ORDER — SERTRALINE HYDROCHLORIDE 25 MG/1
12.5 TABLET, FILM COATED ORAL DAILY
COMMUNITY
End: 2020-12-30 | Stop reason: ALTCHOICE

## 2020-08-17 NOTE — PATIENT INSTRUCTIONS
Patient Education        Learning About Diabetes Food Guidelines  Your Care Instructions     Meal planning is important to manage diabetes. It helps keep your blood sugar at a target level (which you set with your doctor). You don't have to eat special foods. You can eat what your family eats, including sweets once in a while. But you do have to pay attention to how often you eat and how much you eat of certain foods. You may want to work with a dietitian or a certified diabetes educator (CDE) to help you plan meals and snacks. A dietitian or CDE can also help you lose weight if that is one of your goals. What should you know about eating carbs? Managing the amount of carbohydrate (carbs) you eat is an important part of healthy meals when you have diabetes. Carbohydrate is found in many foods. · Learn which foods have carbs. And learn the amounts of carbs in different foods. ? Bread, cereal, pasta, and rice have about 15 grams of carbs in a serving. A serving is 1 slice of bread (1 ounce), ½ cup of cooked cereal, or 1/3 cup of cooked pasta or rice. ? Fruits have 15 grams of carbs in a serving. A serving is 1 small fresh fruit, such as an apple or orange; ½ of a banana; ½ cup of cooked or canned fruit; ½ cup of fruit juice; 1 cup of melon or raspberries; or 2 tablespoons of dried fruit. ? Milk and no-sugar-added yogurt have 15 grams of carbs in a serving. A serving is 1 cup of milk or 2/3 cup of no-sugar-added yogurt. ? Starchy vegetables have 15 grams of carbs in a serving. A serving is ½ cup of mashed potatoes or sweet potato; 1 cup winter squash; ½ of a small baked potato; ½ cup of cooked beans; or ½ cup cooked corn or green peas. · Learn how much carbs to eat each day and at each meal. A dietitian or CDE can teach you how to keep track of the amount of carbs you eat. This is called carbohydrate counting. · If you are not sure how to count carbohydrate grams, use the Plate Method to plan meals.  It is a good, quick way to make sure that you have a balanced meal. It also helps you spread carbs throughout the day. ? Divide your plate by types of foods. Put non-starchy vegetables on half the plate, meat or other protein food on one-quarter of the plate, and a grain or starchy vegetable in the final quarter of the plate. To this you can add a small piece of fruit and 1 cup of milk or yogurt, depending on how many carbs you are supposed to eat at a meal.  · Try to eat about the same amount of carbs at each meal. Do not \"save up\" your daily allowance of carbs to eat at one meal.  · Proteins have very little or no carbs per serving. Examples of proteins are beef, chicken, turkey, fish, eggs, tofu, cheese, cottage cheese, and peanut butter. A serving size of meat is 3 ounces, which is about the size of a deck of cards. Examples of meat substitute serving sizes (equal to 1 ounce of meat) are 1/4 cup of cottage cheese, 1 egg, 1 tablespoon of peanut butter, and ½ cup of tofu. How can you eat out and still eat healthy? · Learn to estimate the serving sizes of foods that have carbohydrate. If you measure food at home, it will be easier to estimate the amount in a serving of restaurant food. · If the meal you order has too much carbohydrate (such as potatoes, corn, or baked beans), ask to have a low-carbohydrate food instead. Ask for a salad or green vegetables. · If you use insulin, check your blood sugar before and after eating out to help you plan how much to eat in the future. · If you eat more carbohydrate at a meal than you had planned, take a walk or do other exercise. This will help lower your blood sugar. What else should you know? · Limit saturated fat, such as the fat from meat and dairy products. This is a healthy choice because people who have diabetes are at higher risk of heart disease. So choose lean cuts of meat and nonfat or low-fat dairy products.  Use olive or canola oil instead of butter or shortening other ways too. It can help you reach and stay at a healthy weight. It also helps improve blood pressure and cholesterol, which can reduce the risk of heart disease. Exercise can make you feel stronger and happier. It can help you relax and sleep better, and give you confidence in other things you do. How can you exercise safely? Before you start a new exercise program, talk to your doctor about how and when to exercise. You may need to have a medical exam and tests before you begin. Some types of exercise can be harmful if your diabetes is causing other problems, such as problems with your feet. Your doctor can tell you what types of exercise are good choices for you. These tips can help you exercise safely when you have diabetes. If your diabetes is controlled by diet or medicine that doesn't lower your blood sugar, you don't need to eat a snack before you exercise. · Check your blood sugar before you exercise. And be careful about what you eat. ? If your blood sugar is less than 100, eat a carbohydrate snack before you exercise. ? Be careful when you exercise if your blood sugar is over 300. High blood sugar can make you dehydrated. And that makes your blood sugar levels go even higher. If you have ketones in your blood or urine and your blood sugar is over 300, do not exercise. · Don't try to do too much at first. Build up your exercise program bit by bit. Try to get at least 30 minutes of exercise on most days of the week. Walking is a good choice. You also may want to do other activities, such as riding a bike or swimming. You might try running or gardening. Try to include muscle-strengthening exercises at least 2 times a week. These exercises include push-ups and weight training. You can also use rubber tubing or stretch bands. You stretch or pull the tubing or band to build muscle strength. If you want to exercise more, slowly increase how hard or long you exercise.   · You may get symptoms of low blood sugar during exercise or up to 24 hours later. Some symptoms of low blood sugar, such as sweating, a fast heartbeat, or feeling tired, can be confused with what can happen anytime you exercise. Other symptoms may include feeling anxious, dizzy, weak, or shaky. So it's a good idea to check your blood sugar again. · You can treat low blood sugar by eating or drinking something that has 15 grams of carbohydrate. These should be quick-sugar foods. Quick-sugar foods such as glucose tablets, table sugar, honey, fruit juice, regular (not diet) soda pop, or hard candy can help raise blood sugar. Check your blood sugar level again 15 minutes after having a quick-sugar food to make sure your level is getting back to your target range. · Drink plenty of water before, during, and after you exercise. · Wear medical alert jewelry that says you have diabetes. You can buy this at most drugstores. · Pay attention to your body. If you are used to exercise and notice that you can't do as much as usual, talk to your doctor. Follow-up care is a key part of your treatment and safety. Be sure to make and go to all appointments, and call your doctor if you are having problems. It's also a good idea to know your test results and keep a list of the medicines you take. Where can you learn more? Go to https://Kaldoorazonia.[employer].. org and sign in to your [employer] account. Enter A394 in the [employer] box to learn more about \"Learning About Diabetes and Exercise. \"     If you do not have an account, please click on the \"Sign Up Now\" link. Current as of: December 20, 2019               Content Version: 12.5  © 0566-4133 Healthwise, Incorporated. Care instructions adapted under license by [address] If you have questions about a medical condition or this instruction, always ask your healthcare professional. [address] any warranty or liability for your use of this information. Patient Education        Learning About Benefits From Quitting Smoking  How does quitting smoking make you healthier? If you're thinking about quitting smoking, you may have a few reasons to be smoke-free. Your health may be one of them. · When you quit smoking, you lower your risks for cancer, lung disease, heart attack, stroke, blood vessel disease, and blindness from macular degeneration. · When you're smoke-free, you get sick less often, and you heal faster. You are less likely to get colds, flu, bronchitis, and pneumonia. · As a nonsmoker, you may find that your mood is better and you are less stressed. When and how will you feel healthier? Quitting has real health benefits that start from day 1 of being smoke-free. And the longer you stay smoke-free, the healthier you get and the better you feel. The first hours  · After just 20 minutes, your blood pressure and heart rate go down. That means there's less stress on your heart and blood vessels. · Within 12 hours, the level of carbon monoxide in your blood drops back to normal. That makes room for more oxygen. With more oxygen in your body, you may notice that you have more energy than when you smoked. After 2 weeks  · Your lungs start to work better. · Your risk of heart attack starts to drop. After 1 month  · When your lungs are clear, you cough less and breathe deeper, so it's easier to be active. · Your sense of taste and smell return. That means you can enjoy food more than you have since you started smoking. Over the years  · Over the years, your risks of heart disease, heart attack, and stroke are lower. · After 10 years, your risk of dying from lung cancer is cut by about half. And your risk for many other types of cancer is lower too. How would quitting help others in your life? When you quit smoking, you improve the health of everyone who now breathes in your smoke.   · Their heart, lung, and cancer risks drop, much like yours.  · They are sick less. For babies and small children, living smoke-free means they're less likely to have ear infections, pneumonia, and bronchitis. · If you're a woman who is or will be pregnant someday, quitting smoking means a healthier . · Children who are close to you are less likely to become adult smokers. Where can you learn more? Go to https://chpepiceweb.FreshGrade. org and sign in to your [employer] account. Enter [id number] [id number] [id number] in the [employer] box to learn more about \"Learning About Benefits From Quitting Smoking. \"     If you do not have an account, please click on the \"Sign Up Now\" link. Current as of: 2020               Content Version: 12.5   Healthwise, Incorporated. Care instructions adapted under license by [facility] ([facility]). If you have questions about a medical condition or this instruction, always ask your healthcare professional. [place] [address] any warranty or liability for your use of this information. Patient Education        Stopping Smoking: Care Instructions  Your Care Instructions     Cigarette smokers crave the nicotine in cigarettes. Giving it up is much harder than simply changing a habit. Your body has to stop craving the nicotine. It is hard to quit, but you can do it. There are many tools that people use to quit smoking. You may find that combining tools works best for you. There are several steps to quitting. First you get ready to quit. Then you get support to help you. After that, you learn new skills and behaviors to become a nonsmoker. For many people, a necessary step is getting and using medicine. Your doctor will help you set up the plan that best meets your needs. You may want to attend a smoking cessation program to help you quit smoking. When you choose a program, look for one that has proven success. Ask your doctor for ideas.  You will greatly increase your chances of success if you take medicine as well as get counseling or join a cessation program.  Some of the changes you feel when you first quit tobacco are uncomfortable. Your body will miss the nicotine at first, and you may feel short-tempered and grumpy. You may have trouble sleeping or concentrating. Medicine can help you deal with these symptoms. You may struggle with changing your smoking habits and rituals. The last step is the tricky one: Be prepared for the smoking urge to continue for a time. This is a lot to deal with, but keep at it. You will feel better. Follow-up care is a key part of your treatment and safety. Be sure to make and go to all appointments, and call your doctor if you are having problems. It's also a good idea to know your test results and keep a list of the medicines you take. How can you care for yourself at home? · Ask your family, friends, and coworkers for support. You have a better chance of quitting if you have help and support. · Join a support group, such as Nicotine Anonymous, for people who are trying to quit smoking. · Consider signing up for a smoking cessation program, such as the American Lung Association's Freedom from Smoking program.  · Get text messaging support. Go to the website at www.smokefree. gov to sign up for the [facility] program.  · Set a quit date. Pick your date carefully so that it is not right in the middle of a big deadline or stressful time. Once you quit, do not even take a puff. Get rid of all ashtrays and lighters after your last cigarette. Clean your house and your clothes so that they do not smell of smoke. · Learn how to be a nonsmoker. Think about ways you can avoid those things that make you reach for a cigarette. ? Avoid situations that put you at greatest risk for smoking. For some people, it is hard to have a drink with friends without smoking. For others, they might skip a coffee break with coworkers who smoke. ? Change your daily routine.  Take a different route to work

## 2020-08-17 NOTE — PROGRESS NOTES
DR. Noemy Duran - PROGRESS NOTE    CHIEF COMPLAINT/HISTORY OF CHIEF COMPLAINT: This 80 y.o.  female comes in today for ongoing evaluation and management of her diabetes mellitus type 2, hyperlipidemia, hypothyroidism, chronic obstructive pulmonary disease, osteoarthritis, gastroesophageal reflux disease, osteoarthritis, iron deficiency anemia, anxiety, depression, and tobacco abuse. She has been having more constipation lately. She frequently feels \"FOS,\" like she is full of stool. Yesterday she had a normal bowel movement and no abdominal pain, however. She has been having problems with having to get up and urinate several times through the night. She typically goes between 3 and 5 times a night. She used to take something for overactive bladder \"years ago\" and would like to try it again. She tries to control her elevated fasting glucose with diet and exercise. Her family reports her sugars running between 110 and 130. She takes Ativan for anxiety, which does help. She is also on Zoloft for depression, which is helping. She tries to control her hyperlipidemia with diet and exercise. Her gastroesophageal reflux disease has been stable. She has chronic obstructive pulmonary disease and is supposed to use an albuterol inhaler as needed. She has been more short of breath lately, but she hasn't been using her inhaler because she doesn't think she needs it. She has been taking Ativan and Tylenol for her headaches, which does help. She also uses the Ativan for anxiety.  She is still smoking about one pack of cigarettes a day. Otherwise she seems to be doing fairly well and denies any other complaints.        ALLERGIES/INTOLERANCES:   Allergies   Allergen Reactions    Acyclovir And Related Diarrhea and Nausea Only    Albuterol     Amitriptyline      Dizziness      Asa [Aspirin]      Stomach upset      Betadine [Povidone Iodine]     Biaxin [Clarithromycin] Nausea Only    Buspar [Buspirone]     Cefuroxime Axetil      Diarrhea      Celestone [Betamethasone]     Codeine     Darvocet A500 [Propoxyphene N-Acetaminophen]     Dicyclomine Hcl     Doxycycline Nausea Only    Esomeprazole Magnesium     Fluconazole     Lidex [Fluocinolone]     Neomycin     Pcn [Penicillins] Hives    Protonix [Pantoprazole]     Quinolones Hives    Statins [Statins]      Leg cramps      Tape [Adhesive Tape]     Tramadol      \"makes her walk into walls\"    Valium     Vicodin [Hydrocodone-Acetaminophen]     Zithromax [Azithromycin]      Nausea      Hydrocodone Nausea Only       MEDICATIONS:   Outpatient Medications Marked as Taking for the 8/17/20 encounter (Office Visit) with Aldo Gooden, DO   Medication Sig Dispense Refill    ferrous gluconate (FERGON) 324 (38 Fe) MG tablet Take 324 mg by mouth daily (with breakfast)      sertraline (ZOLOFT) 25 MG tablet Take 12.5 mg by mouth daily      LORazepam (ATIVAN) 1 MG tablet Take 1 tablet by mouth every 6 hours as needed for Anxiety for up to 30 days. 120 tablet 0    albuterol sulfate HFA (PROVENTIL HFA) 108 (90 Base) MCG/ACT inhaler Inhale 2 puffs into the lungs every 6 hours as needed for Wheezing or Shortness of Breath 1 Inhaler 3    nitroGLYCERIN (NITROSTAT) 0.4 MG SL tablet Place 1 tablet under the tongue every 5 minutes as needed for Chest pain 25 tablet 3    cetirizine (ZYRTEC) 10 MG tablet Take 10 mg by mouth daily      Fiber POWD Take by mouth nightly      Propylene Glycol (SYSTANE BALANCE OP) Apply 1 drop to eye 3 times daily       acetaminophen (TYLENOL) 500 MG tablet Take 500 mg by mouth every 6 hours as needed for Pain.  Probiotic Product (PROBIOTIC DAILY PO) Take 1 tablet by mouth daily TruBiotics      Multiple Vitamins-Minerals (MULTIVITAMIN & MINERAL PO) Take 1 tablet by mouth daily.  Cholecalciferol (VITAMIN D3) 1000 UNITS TABS Take 1 tablet by mouth daily.       docusate sodium (COLACE) 100 MG capsule Take 100 mg by mouth 2 times daily  ascorbic acid (VITAMIN C) 500 MG tablet Take 1,000 mg by mouth daily.  Biotin 5000 MCG CAPS Take 2 tablets by mouth daily          IMMUNIZATIONS: Reviewed for influenza and pneumococcal status as indicated in electronic record. REVIEW OF SYSTEMS:     General: negative for - chills, fever or night sweats  Skin: negative for - pruritus or rash  Head: Negative for: headache or recent history of head trauma  Ear, Nose, Throat: negative for - epistaxis, nasal congestion, nasal discharge, sore throat, tinnitus or vertigo  Cardiovascular: negative for - chest pain, dyspnea on exertion or shortness of breath  Respiratory: negative for - cough, hemoptysis or wheezing  Gastrointestinal: positive for - constipation  negative for - diarrhea or nausea/vomiting  Genitourinary: positive for - nocturia and urinary frequency/urgency  negative for - dysuria or hematuria  Musculoskeletal: positive for - joint pain  negative for - muscle pain or muscular weakness  Neurologic: negative for - gait disturbance, numbness/tingling, seizures or tremors  Psychiatric: positive for - anxiety and depression  negative for - suicidal ideation    HEMOGLOBIN A1c FROM CURRENT AND PRIOR VISIT:    Hemoglobin A1C   Date Value Ref Range Status   08/17/2020 5.8 4.8 - 5.9 % Final   02/03/2020 5.3 4.8 - 5.9 % Final        PHYSICAL EXAMINATION:    Wt Readings from Last 2 Encounters:   08/17/20 97 lb (44 kg)   05/18/20 95 lb (43.1 kg)       Vitals:    08/17/20 1337   BP: 130/88   Site: Left Upper Arm   Position: Sitting   Cuff Size: Medium Adult   Pulse: 80   Resp: 16   Temp: 97.2 °F (36.2 °C)   Weight: 97 lb (44 kg)   Height: 4' 11\" (1.499 m)     Body mass index is 19.59 kg/m². General: This is a 80 y.o.  female who is alert and oriented to person, place and time. She appears to be her stated age and does not appear to be in any acute distress. Skin: Skin color, texture, turgor normal. No rashes or lesions.   HEENT/Neck: Head: Normal, normocephalic, atraumatic. Eye: Normal external eye, conjunctiva, lids cornea, BLAIR. Ears: Normal TM's bilaterally. Normal auditory canals and external ears. Non-tender. Nose: Normal external nose, mucus membranes and septum. Pharynx: Dental Hygiene adequate. Normal buccal mucosa. Normal pharynx. Neck / Thyroid: Supple, no masses, nodes, nodules or enlargement. Lungs: Normal - CTA without rales, rhonchi, or wheezing. Heart: regular rate and rhythm, S1, S2 normal, no murmur, click, rub or gallop No S3 or S4. Abdomen: Non-obese, soft, non-distended, non-tender, normal active bowel sounds, no masses palpated and no hepatosplenomegaly  Extremities: There is no clubbing, cyanosis, or edema in any of the extremities. Neurologic:  cranial nerves II-XII are grossly intact  Osteopathic Structural Examination: Patient was examined in the seated and standing positions. There was full range of motion in the cervical, thoracic, and lumbar spines. No scoliosis. No change in thoracic kyphosis or lumbar lordosis. No increased paravertebral muscle tenderness. ASSESSMENT/PLAN:    1. Controlled type 2 diabetes mellitus without complication, without long-term current use of insulin (HCC)  - Her most recent HbA1c was 5.8%, which is lower than the target value of 6.5% or lower. We are not going to make changes to her diabetic medications. She was advised to continue to watch her diet and exercise to keep her diabetes under control.    - Hemoglobin A1C; Future  - Microalbumin, Ur; Future  - Urinalysis with Microscopic; Future    2. Overactive bladder, worse  - We will put her back on oxybutynin ER 10 mg nightly. She used to take it 6 years ago according to our records. 3. Mixed hyperlipidemia, controlled. - She will continue to watch her diet and exercise  - Lipid Panel; Future    4.  Other specified hypothyroidism, stable  - She will continue to take Synthroid  - CBC Auto Differential; Future  - Comprehensive Metabolic Panel, Fasting; Future  - TSH without Reflex; Future  - T4, Free; Future    5. Coronary artery disease involving native coronary artery of native heart, angina presence unspecified, stable  - We will continue to monitor     6. Other emphysema ([facility] [address] stable  - She will continue to use her breathing medicines  - WA TOBACCO USE CESSATION INTERMEDIATE 3-10 MINUTES    7. Gastroesophageal reflux disease without esophagitis, stable  - We will continue to monitor     8. Major depressive disorder with single episode, in partial remission ([address] stable  9. Anxiety, stable  - She will continue to take her psychiatric medicines    10. Primary osteoarthritis involving multiple joints, stable  - We will continue to monitor     11. Chronic nonintractable headache, unspecified headache type, stable  - We will continue to monitor     12. Slow transit constipation, improved  - We will continue to monitor     13. Tobacco abuse  14. Cigarette smoker  - She was advised to quit smoking immediately and completely. The risks of continued smoking were reviewed with her and she did verbalize understanding.  - Tobacco Cessation Counseling: Patient advised about behavior change, including information about personal health harms, usage of appropriate cessation measures and benefits of cessation.   Time spent (minutes): 5 minutes   - WA TOBACCO USE CESSATION INTERMEDIATE 3-10 MINUTES      Orders Placed This Encounter   Procedures    Hemoglobin A1C     Standing Status:   Future     Standing Expiration Date:   8/17/2021    CBC Auto Differential     Standing Status:   Future     Standing Expiration Date:   8/17/2021    Comprehensive Metabolic Panel, Fasting     Standing Status:   Future     Standing Expiration Date:   8/17/2021    Lipid Panel     Standing Status:   Future     Standing Expiration Date:   8/17/2021     Order Specific Question:   Is Patient Fasting?/# of Hours     Answer:   12    Microalbumin, Ur     Standing Status:   Future

## 2020-08-17 NOTE — PROGRESS NOTES
Lazarus Leatherwood received counseling on the following healthy behaviors: nutrition, exercise and tobacco cessation  Reviewed prior labs and health maintenance  Continue current medications, diet and exercise. Discussed use, benefit, and side effects of prescribed medications. Barriers to medication compliance addressed. Patient given educational materials - see patient instructions  Was a self-tracking handout given in paper form or via [employer]? Yes    Requested Prescriptions     Signed Prescriptions Disp Refills    oxybutynin (DITROPAN XL) 10 MG extended release tablet 30 tablet 11     Sig: Take 1 tablet by mouth nightly       All patient questions answered. Patient voiced understanding. Quality Measures    Body mass index is 19.59 kg/m². Normal. Weight control plan discussed: Healthy diet and regular exercise. BP: 130/88. Blood pressure is normal. Treatment plan: See main progress note    Fall Risk 10/4/2019 9/4/2018 2/16/2018 4/7/2017 9/28/2016 5/6/2014   2 or more falls in past year? yes yes no no no yes   Fall with injury in past year? yes no yes no no no     The patient has a history of falls. I did not - not indicated , complete a risk assessment for falls. See progress note for plan, if risk assessment completed. Lab Results   Component Value Date    LDLCHOLESTEROL 96 10/03/2019    (goal LDL reduction with dx if diabetes is 50% LDL reduction)    PHQ Scores 10/4/2019 12/21/2018 9/4/2018 5/18/2018 4/7/2017 9/28/2016 2/3/2014   PHQ2 Score 0 2 4 0 0 0 0   PHQ9 Score 0 2 13 0 0 0 0     See progress note for plan, if depression exists. Interpretation of Total Score: Major depression if the answer to questions 1 or 2 and 5 or more of questions 1 to 9 are at least [facility] than half the days. \"  Other depressive syndrome if questions 1 or 2 and two, three, or four of questions 1 to 9 are at least [facility] than half the days\"   Depression Severity: 5-9 = Mild depression, 10-14 = Moderate depression, 15-19 = Moderately severe depression, 20-27 = Severe depression

## 2020-09-04 RX ORDER — LORAZEPAM 1 MG/1
1 TABLET ORAL EVERY 6 HOURS PRN
Qty: 120 TABLET | Refills: 0 | Status: SHIPPED | OUTPATIENT
Start: 2020-09-04 | End: 2020-10-06 | Stop reason: SDUPTHER

## 2020-09-04 NOTE — TELEPHONE ENCOUNTER
Sabrina's daughter called requesting a refill of the below medication which has been pended for you:     Requested Prescriptions     Pending Prescriptions Disp Refills    LORazepam (ATIVAN) 1 MG tablet 120 tablet 0     Sig: Take 1 tablet by mouth every 6 hours as needed for Anxiety for up to 30 days.        Last Appointment Date: 8/17/2020  Next Appointment Date: 11/16/2020    Allergies   Allergen Reactions    Acyclovir And Related Diarrhea and Nausea Only    Albuterol     Amitriptyline      Dizziness      Asa [Aspirin]      Stomach upset      Betadine [Povidone Iodine]     Biaxin [Clarithromycin] Nausea Only    Buspar [Buspirone]     Cefuroxime Axetil      Diarrhea      Celestone [Betamethasone]     Codeine     Darvocet A500 [Propoxyphene N-Acetaminophen]     Dicyclomine Hcl     Doxycycline Nausea Only    Esomeprazole Magnesium     Fluconazole     Lidex [Fluocinolone]     Neomycin     Pcn [Penicillins] Hives    Protonix [Pantoprazole]     Quinolones Hives    Statins [Statins]      Leg cramps      Tape [Adhesive Tape]     Tramadol      \"makes her walk into walls\"    Valium     Vicodin [Hydrocodone-Acetaminophen]     Zithromax [Azithromycin]      Nausea      Hydrocodone Nausea Only

## 2020-10-06 RX ORDER — LORAZEPAM 1 MG/1
1 TABLET ORAL EVERY 6 HOURS PRN
Qty: 120 TABLET | Refills: 0 | Status: SHIPPED | OUTPATIENT
Start: 2020-10-06 | End: 2020-11-06 | Stop reason: SDUPTHER

## 2020-10-06 NOTE — TELEPHONE ENCOUNTER
Pharmacy requesting a refill of the below medication which has been pended for you:     Requested Prescriptions     Pending Prescriptions Disp Refills    LORazepam (ATIVAN) 1 MG tablet 120 tablet 0     Sig: Take 1 tablet by mouth every 6 hours as needed for Anxiety for up to 30 days.        Last Appointment Date: 8/17/2020  Next Appointment Date: 11/16/2020    Allergies   Allergen Reactions    Acyclovir And Related Diarrhea and Nausea Only    Albuterol     Amitriptyline      Dizziness      Asa [Aspirin]      Stomach upset      Betadine [Povidone Iodine]     Biaxin [Clarithromycin] Nausea Only    Buspar [Buspirone]     Cefuroxime Axetil      Diarrhea      Celestone [Betamethasone]     Codeine     Darvocet A500 [Propoxyphene N-Acetaminophen]     Dicyclomine Hcl     Doxycycline Nausea Only    Esomeprazole Magnesium     Fluconazole     Lidex [Fluocinolone]     Neomycin     Pcn [Penicillins] Hives    Protonix [Pantoprazole]     Quinolones Hives    Statins [Statins]      Leg cramps      Tape [Adhesive Tape]     Tramadol      \"makes her walk into walls\"    Valium     Vicodin [Hydrocodone-Acetaminophen]     Zithromax [Azithromycin]      Nausea      Hydrocodone Nausea Only

## 2020-10-07 NOTE — TELEPHONE ENCOUNTER
[place] checked today    Controlled Substance Monitoring:    Acute and Chronic Pain Monitoring:   RX Monitoring 10/6/2020   Attestation -   Acute Pain Prescriptions -   Periodic Controlled Substance Monitoring No signs of potential drug abuse or diversion identified.

## 2020-11-06 RX ORDER — LORAZEPAM 1 MG/1
1 TABLET ORAL EVERY 6 HOURS PRN
Qty: 120 TABLET | Refills: 0 | Status: SHIPPED | OUTPATIENT
Start: 2020-11-06 | End: 2020-12-06 | Stop reason: SDUPTHER

## 2020-11-06 NOTE — TELEPHONE ENCOUNTER
Patients daughter called in requesting a refill on ativan sent to [facility]. Medication pended if agreeable.      Last Appt:  8/17/2020  Next Appt:   11/16/2020  Med verified in Epic

## 2020-11-27 ENCOUNTER — OFFICE VISIT (OUTPATIENT)
Dept: PRIMARY CARE CLINIC | Age: 85
End: 2020-11-27
Payer: MEDICARE

## 2020-11-27 ENCOUNTER — HOSPITAL ENCOUNTER (OUTPATIENT)
Dept: GENERAL RADIOLOGY | Age: 85
Discharge: HOME | End: 2020-11-29
Payer: MEDICARE

## 2020-11-27 ENCOUNTER — HOSPITAL ENCOUNTER (OUTPATIENT)
Dept: CT IMAGING | Age: 85
Discharge: HOME | End: 2020-11-29
Payer: MEDICARE

## 2020-11-27 VITALS
SYSTOLIC BLOOD PRESSURE: 90 MMHG | DIASTOLIC BLOOD PRESSURE: 60 MMHG | BODY MASS INDEX: 19.59 KG/M2 | TEMPERATURE: 98 F | HEIGHT: 59 IN | HEART RATE: 92 BPM | OXYGEN SATURATION: 93 %

## 2020-11-27 PROCEDURE — 1123F ACP DISCUSS/DSCN MKR DOCD: CPT | Performed by: PHYSICIAN ASSISTANT

## 2020-11-27 PROCEDURE — 99214 OFFICE O/P EST MOD 30 MIN: CPT | Performed by: PHYSICIAN ASSISTANT

## 2020-11-27 PROCEDURE — 72100 X-RAY EXAM L-S SPINE 2/3 VWS: CPT

## 2020-11-27 PROCEDURE — G8427 DOCREV CUR MEDS BY ELIG CLIN: HCPCS | Performed by: PHYSICIAN ASSISTANT

## 2020-11-27 PROCEDURE — 99213 OFFICE O/P EST LOW 20 MIN: CPT

## 2020-11-27 PROCEDURE — 1090F PRES/ABSN URINE INCON ASSESS: CPT | Performed by: PHYSICIAN ASSISTANT

## 2020-11-27 PROCEDURE — G8420 CALC BMI NORM PARAMETERS: HCPCS | Performed by: PHYSICIAN ASSISTANT

## 2020-11-27 PROCEDURE — G8484 FLU IMMUNIZE NO ADMIN: HCPCS | Performed by: PHYSICIAN ASSISTANT

## 2020-11-27 PROCEDURE — 73060 X-RAY EXAM OF HUMERUS: CPT

## 2020-11-27 PROCEDURE — 4040F PNEUMOC VAC/ADMIN/RCVD: CPT | Performed by: PHYSICIAN ASSISTANT

## 2020-11-27 PROCEDURE — 70450 CT HEAD/BRAIN W/O DYE: CPT

## 2020-11-27 PROCEDURE — 0518F FALL PLAN OF CARE DOCD: CPT | Performed by: PHYSICIAN ASSISTANT

## 2020-11-27 PROCEDURE — 3288F FALL RISK ASSESSMENT DOCD: CPT | Performed by: PHYSICIAN ASSISTANT

## 2020-11-27 PROCEDURE — 4004F PT TOBACCO SCREEN RCVD TLK: CPT | Performed by: PHYSICIAN ASSISTANT

## 2020-11-27 ASSESSMENT — ENCOUNTER SYMPTOMS
SHORTNESS OF BREATH: 0
BACK PAIN: 1
COUGH: 1

## 2020-11-27 NOTE — PROGRESS NOTES
Subjective:      Patient ID: Rona Maddox is a 80 y.o. female. Arm Pain    The incident occurred more than 1 week ago (orginal fracture rt arm Feb 2018. Monday fell 2 x's.). The incident occurred at home. The injury mechanism was a fall. The pain is present in the right forearm. The pain radiates to the right arm. The pain is at a severity of 8/10. The pain is moderate. Pertinent negatives include no chest pain. The symptoms are aggravated by movement. She has tried acetaminophen for the symptoms. The treatment provided mild relief. Review of Systems   Constitutional: Positive for activity change. Negative for appetite change and fever. Respiratory: Positive for cough (smoker). Negative for shortness of breath. Cardiovascular: Negative for chest pain. Musculoskeletal: Positive for arthralgias (rt arm pain ) and back pain (coccyx and low back pain ). Objective:   Physical Exam  Vitals signs and nursing note reviewed. Constitutional:       General: She is not in acute distress. Appearance: Normal appearance. She is not ill-appearing, toxic-appearing or diaphoretic. HENT:      Head: Normocephalic. Neck:      Musculoskeletal: No neck rigidity or muscular tenderness. Cardiovascular:      Rate and Rhythm: Normal rate and regular rhythm. Heart sounds: Normal heart sounds. Pulmonary:      Effort: Pulmonary effort is normal.      Breath sounds: Normal breath sounds. Musculoskeletal:         General: Tenderness and signs of injury (fall on Monday) present. No deformity. Right upper arm: She exhibits tenderness. She exhibits no bony tenderness, no swelling, no edema, no deformity and no laceration. Comments: Pain to rt arm since 2018 due to fracture. Difficulty using arm. Pain to sacral spine on palpation. Skin:     General: Skin is warm and dry. Neurological:      Mental Status: She is alert and oriented to person, place, and time.    Psychiatric:         Mood and Affect: Mood normal.         Behavior: Behavior normal.         Thought Content: Thought content normal.         Judgment: Judgment normal.         Assessment:      1. Acute low back pain, unspecified back pain laterality, unspecified whether sciatica present    2. Right arm pain    3. Fall, initial encounter            Plan:      Xray humerus. Xray lumbar spine. Ct head w/o contrast.  Home health referral for PT and Skilled nursing. Follow up with PCP in 1 week.         Jerel Scheuermann

## 2020-11-30 ENCOUNTER — TELEPHONE (OUTPATIENT)
Dept: PRIMARY CARE CLINIC | Age: 85
End: 2020-11-30

## 2020-11-30 NOTE — TELEPHONE ENCOUNTER
[facility] to [facility]

## 2020-12-06 RX ORDER — LORAZEPAM 1 MG/1
1 TABLET ORAL EVERY 6 HOURS PRN
Qty: 120 TABLET | Refills: 0 | Status: SHIPPED
Start: 2020-12-06 | End: 2020-12-15 | Stop reason: SDUPTHER

## 2020-12-15 ENCOUNTER — HOSPITAL ENCOUNTER (OUTPATIENT)
Dept: LAB | Age: 85
Discharge: HOME | End: 2020-12-15
Payer: MEDICARE

## 2020-12-15 ENCOUNTER — OFFICE VISIT (OUTPATIENT)
Dept: INTERNAL MEDICINE | Age: 85
End: 2020-12-15
Payer: MEDICARE

## 2020-12-15 VITALS
BODY MASS INDEX: 18.3 KG/M2 | HEIGHT: 60 IN | WEIGHT: 93.2 LBS | SYSTOLIC BLOOD PRESSURE: 118 MMHG | RESPIRATION RATE: 20 BRPM | HEART RATE: 80 BPM | DIASTOLIC BLOOD PRESSURE: 78 MMHG

## 2020-12-15 PROBLEM — N32.81 OVERACTIVE BLADDER: Status: ACTIVE | Noted: 2020-12-15

## 2020-12-15 LAB
ABSOLUTE EOS #: 0.07 K/UL (ref 0–0.44)
ABSOLUTE IMMATURE GRANULOCYTE: 0.03 K/UL (ref 0–0.3)
ABSOLUTE LYMPH #: 1.39 K/UL (ref 1.1–3.7)
ABSOLUTE MONO #: 0.92 K/UL (ref 0.1–1.2)
ALBUMIN SERPL-MCNC: 4.5 G/DL (ref 3.5–5.2)
ALBUMIN/GLOBULIN RATIO: 1.4 (ref 1–2.5)
ALP BLD-CCNC: 106 U/L (ref 35–104)
ALT SERPL-CCNC: 14 U/L (ref 5–33)
ANION GAP SERPL CALCULATED.3IONS-SCNC: 10 MMOL/L (ref 9–17)
AST SERPL-CCNC: 27 U/L
BASOPHILS # BLD: 1 % (ref 0–2)
BASOPHILS ABSOLUTE: 0.09 K/UL (ref 0–0.2)
BILIRUB SERPL-MCNC: 0.33 MG/DL (ref 0.3–1.2)
BUN BLDV-MCNC: 17 MG/DL (ref 8–23)
BUN/CREAT BLD: 27 (ref 9–20)
CALCIUM SERPL-MCNC: 10.7 MG/DL (ref 8.6–10.4)
CHLORIDE BLD-SCNC: 99 MMOL/L (ref 98–107)
CO2: 28 MMOL/L (ref 20–31)
CREAT SERPL-MCNC: 0.64 MG/DL (ref 0.5–0.9)
DIFFERENTIAL TYPE: ABNORMAL
EOSINOPHILS RELATIVE PERCENT: 1 % (ref 1–4)
ESTIMATED AVERAGE GLUCOSE: 117 MG/DL
GFR AFRICAN AMERICAN: >60 ML/MIN
GFR NON-AFRICAN AMERICAN: >60 ML/MIN
GFR SERPL CREATININE-BSD FRML MDRD: ABNORMAL ML/MIN/{1.73_M2}
GFR SERPL CREATININE-BSD FRML MDRD: ABNORMAL ML/MIN/{1.73_M2}
GLUCOSE BLD-MCNC: 108 MG/DL (ref 70–99)
HBA1C MFR BLD: 5.7 % (ref 4.8–5.9)
HCT VFR BLD CALC: 35.8 % (ref 36.3–47.1)
HEMOGLOBIN: 10.8 G/DL (ref 11.9–15.1)
IMMATURE GRANULOCYTES: 0 %
LYMPHOCYTES # BLD: 19 % (ref 24–43)
MCH RBC QN AUTO: 25.7 PG (ref 25.2–33.5)
MCHC RBC AUTO-ENTMCNC: 30.2 G/DL (ref 25.2–33.5)
MCV RBC AUTO: 85.2 FL (ref 82.6–102.9)
MONOCYTES # BLD: 13 % (ref 3–12)
NRBC AUTOMATED: 0 PER 100 WBC
PDW BLD-RTO: 16.1 % (ref 11.8–14.4)
PLATELET # BLD: 441 K/UL (ref 138–453)
PLATELET ESTIMATE: ABNORMAL
PMV BLD AUTO: 9.4 FL (ref 8.1–13.5)
POTASSIUM SERPL-SCNC: 5 MMOL/L (ref 3.7–5.3)
RBC # BLD: 4.2 M/UL (ref 3.95–5.11)
RBC # BLD: ABNORMAL 10*6/UL
SEG NEUTROPHILS: 66 % (ref 36–65)
SEGMENTED NEUTROPHILS ABSOLUTE COUNT: 4.8 K/UL (ref 1.5–8.1)
SODIUM BLD-SCNC: 137 MMOL/L (ref 135–144)
TOTAL PROTEIN: 7.7 G/DL (ref 6.4–8.3)
TSH SERPL DL<=0.05 MIU/L-ACNC: 1.48 MIU/L (ref 0.3–5)
WBC # BLD: 7.3 K/UL (ref 3.5–11.3)
WBC # BLD: ABNORMAL 10*3/UL

## 2020-12-15 PROCEDURE — 85025 COMPLETE CBC W/AUTO DIFF WBC: CPT

## 2020-12-15 PROCEDURE — 80061 LIPID PANEL: CPT

## 2020-12-15 PROCEDURE — 84443 ASSAY THYROID STIM HORMONE: CPT

## 2020-12-15 PROCEDURE — 3288F FALL RISK ASSESSMENT DOCD: CPT | Performed by: NURSE PRACTITIONER

## 2020-12-15 PROCEDURE — 4004F PT TOBACCO SCREEN RCVD TLK: CPT | Performed by: NURSE PRACTITIONER

## 2020-12-15 PROCEDURE — 99214 OFFICE O/P EST MOD 30 MIN: CPT

## 2020-12-15 PROCEDURE — 83036 HEMOGLOBIN GLYCOSYLATED A1C: CPT

## 2020-12-15 PROCEDURE — 69209 REMOVE IMPACTED EAR WAX UNI: CPT | Performed by: NURSE PRACTITIONER

## 2020-12-15 PROCEDURE — 0518F FALL PLAN OF CARE DOCD: CPT | Performed by: NURSE PRACTITIONER

## 2020-12-15 PROCEDURE — 1123F ACP DISCUSS/DSCN MKR DOCD: CPT | Performed by: NURSE PRACTITIONER

## 2020-12-15 PROCEDURE — G8926 SPIRO NO PERF OR DOC: HCPCS | Performed by: NURSE PRACTITIONER

## 2020-12-15 PROCEDURE — G8427 DOCREV CUR MEDS BY ELIG CLIN: HCPCS | Performed by: NURSE PRACTITIONER

## 2020-12-15 PROCEDURE — 80053 COMPREHEN METABOLIC PANEL: CPT

## 2020-12-15 PROCEDURE — 69210 REMOVE IMPACTED EAR WAX UNI: CPT | Performed by: NURSE PRACTITIONER

## 2020-12-15 PROCEDURE — 1090F PRES/ABSN URINE INCON ASSESS: CPT | Performed by: NURSE PRACTITIONER

## 2020-12-15 PROCEDURE — 3023F SPIROM DOC REV: CPT | Performed by: NURSE PRACTITIONER

## 2020-12-15 PROCEDURE — G8419 CALC BMI OUT NRM PARAM NOF/U: HCPCS | Performed by: NURSE PRACTITIONER

## 2020-12-15 PROCEDURE — 99214 OFFICE O/P EST MOD 30 MIN: CPT | Performed by: NURSE PRACTITIONER

## 2020-12-15 PROCEDURE — 36415 COLL VENOUS BLD VENIPUNCTURE: CPT

## 2020-12-15 PROCEDURE — G8484 FLU IMMUNIZE NO ADMIN: HCPCS | Performed by: NURSE PRACTITIONER

## 2020-12-15 PROCEDURE — 4040F PNEUMOC VAC/ADMIN/RCVD: CPT | Performed by: NURSE PRACTITIONER

## 2020-12-15 RX ORDER — ALBUTEROL SULFATE 2.5 MG/3ML
2.5 SOLUTION RESPIRATORY (INHALATION) EVERY 6 HOURS PRN
Qty: 120 EACH | Refills: 3 | Status: SHIPPED | OUTPATIENT
Start: 2020-12-15

## 2020-12-15 ASSESSMENT — ENCOUNTER SYMPTOMS
NAUSEA: 0
DIARRHEA: 0
SORE THROAT: 0
SHORTNESS OF BREATH: 0
CHEST TIGHTNESS: 0
VOMITING: 0

## 2020-12-15 ASSESSMENT — PATIENT HEALTH QUESTIONNAIRE - PHQ9
SUM OF ALL RESPONSES TO PHQ QUESTIONS 1-9: 2
SUM OF ALL RESPONSES TO PHQ QUESTIONS 1-9: 2
1. LITTLE INTEREST OR PLEASURE IN DOING THINGS: 1
SUM OF ALL RESPONSES TO PHQ QUESTIONS 1-9: 2
2. FEELING DOWN, DEPRESSED OR HOPELESS: 1
SUM OF ALL RESPONSES TO PHQ9 QUESTIONS 1 & 2: 2

## 2020-12-15 NOTE — PATIENT INSTRUCTIONS
Patient Education        Preventing Falls: Care Instructions  Your Care Instructions     Getting around your home safely can be a challenge if you have injuries or health problems that make it easy for you to fall. Loose rugs and furniture in walkways are among the dangers for many older people who have problems walking or who have poor eyesight. People who have conditions such as arthritis, osteoporosis, or dementia also have to be careful not to fall. You can make your home safer with a few simple measures. Follow-up care is a key part of your treatment and safety. Be sure to make and go to all appointments, and call your doctor if you are having problems. It's also a good idea to know your test results and keep a list of the medicines you take. How can you care for yourself at home? Taking care of yourself  · You may get dizzy if you do not drink enough water. To prevent dehydration, drink plenty of fluids, enough so that your urine is light yellow or clear like water. Choose water and other caffeine-free clear liquids. If you have kidney, heart, or liver disease and have to limit fluids, talk with your doctor before you increase the amount of fluids you drink. · Exercise regularly to improve your strength, muscle tone, and balance. Walk if you can. Swimming may be a good choice if you cannot walk easily. · Have your vision and hearing checked each year or any time you notice a change. If you have trouble seeing and hearing, you might not be able to avoid objects and could lose your balance. · Know the side effects of the medicines you take. Ask your doctor or pharmacist whether the medicines you take can affect your balance. Sleeping pills or sedatives can affect your balance. · Limit the amount of alcohol you drink. Alcohol can impair your balance and other senses. · Ask your doctor whether calluses or corns on your feet need to be removed. If you wear loose-fitting shoes because of calluses or corns, you can lose your balance and fall. · Talk to your doctor if you have numbness in your feet. Preventing falls at home  · Remove raised doorway thresholds, throw rugs, and clutter. Repair loose carpet or raised areas in the floor. · Move furniture and electrical cords to keep them out of walking paths. · Use nonskid floor wax, and wipe up spills right away, especially on ceramic tile floors. · If you use a walker or cane, put rubber tips on it. If you use crutches, clean the bottoms of them regularly with an abrasive pad, such as steel wool. · Keep your house well lit, especially [place], and outside walkways. Use night-lights in areas such as hallways and bathrooms. Add extra light switches or use remote switches (such as switches that go on or off when you clap your hands) to make it easier to turn lights on if you have to get up during the night. · Install sturdy handrails on stairways. · Move items in your cabinets so that the things you use a lot are on the lower shelves (about waist level). · Keep a cordless phone and a flashlight with new batteries by your bed. If possible, put a phone in each of the main rooms of your house, or carry a cell phone in case you fall and cannot reach a phone. Or, you can wear a device around your neck or wrist. You push a button that sends a signal for help. · Wear low-heeled shoes that fit well and give your feet good support. Use footwear with nonskid soles. Check the heels and soles of your shoes for wear. Repair or replace worn heels or soles. · Do not wear socks without shoes on wood floors. · Walk on the grass when the sidewalks are slippery. If you live in an area that gets snow and ice in the winter, sprinkle salt on slippery steps and sidewalks.   Preventing falls in the bath · Install grab bars and nonskid mats inside and outside your shower or tub and near the toilet and sinks. · Use shower chairs and bath benches. · Use a hand-held shower head that will allow you to sit while showering. · Get into a tub or shower by putting the weaker leg in first. Get out of a tub or shower with your strong side first.  · Repair loose toilet seats and consider installing a raised toilet seat to make getting on and off the toilet easier. · Keep your bathroom door unlocked while you are in the shower. Where can you learn more? Go to https://[employer]rubberit. org and sign in to your [employer] account. Enter [phone] in the [employer] box to learn more about \"Preventing Falls: Care Instructions. \"     If you do not have an account, please click on the \"Sign Up Now\" link. Current as of: April 15, 2020               Content Version: 12.6  © 4951-5073 [employer]. Care instructions adapted under license by [facility] ([facility]). If you have questions about a medical condition or this instruction, always ask your healthcare professional. [place] [address] any warranty or liability for your use of this information.

## 2020-12-15 NOTE — PROGRESS NOTES
Warm water ear lavage performed to bilateral ears. Large amount of light yellow-orange cerumen expelled from right ear, with assist of lighted curette. Tympanic membrane visible. Small amount of redness to ear canal with no residual cerumen noted. Moderate amount of light yellow-orange cerumen expelled from left ear with assist of lighted curette. Tympanic membrane visible. No irritation to ear canal, but scant amount of cerumen still present. Procedure was stopped due to patient coughing. Pt denies pain or dizziness.

## 2020-12-15 NOTE — PROGRESS NOTES
[facility] Internal Medicine  [address] [place], [address] Kaia [address]n  [phone]      Ivory Downing c/o of Established New Doctor (previous Dr. Marylu Waldron patient)      HPI:     HPI  Patient presents for evaluation and management of chronic medical conditions as noted below. Diabetes is well controlled with diet, A1C 5.8. Not currently on medication for hyperlipidemia. She does not follow with cardiology for her coronary artery disease. Currently asymptomatic. Has not been using nitroglycerin. Hypothyroidism is currently stable without medication. COPD is stable. She does have albuterol inhaler PRN, but her daughter questions if she is using it correctly - previous allergy noted, but daughter states she tolerates this. Discussed obtaining nebulizer, patient is agreeable. Anemia is been stable. She has been unable to tolerate iron supplements, due in part to constipation. Fatigue has been stable. Continues on sertraline for depression and anxiety. Daughter states she has been on ativan for nearly 3 decades. Discussed risks and benefits, patient and daughter express understanding. Arthritis stable with PRN tylenol. Currently on vitamin D supplements and multivitamin for osteoporosis. Has ongoing problems with chronic constipation. She states this is not currently problematic. Denies hematochezia    Chronic sinusitis has not recently been problematic. GERD well controlled without medication. Daughter notes her toes are sometimes purple - has been slowly progressing. Patient denies pain. Pulses are 2+ bilaterally and toes are warm - discussed obtaining JOHANA, daughter declines at this time. Has been having some mild R facial drooping for over 1 month. No facial droop noted on exam today. Discussed obtaining CT, family declines at this time     Does use oxybutynin for overactive bladder, still gets up several times throughout the night. Had fall 11/23, declined initial evaluation. Previous fall R shoulder that is still problematic. Has been getting home PT, OT is supposed to start soon. Concern for cerumen impaction. Current Outpatient Medications   Medication Sig Dispense Refill    albuterol (PROVENTIL) (2.5 MG/3ML) 0.083% nebulizer solution Take 3 mLs by nebulization every 6 hours as needed for Wheezing 120 each 3    LORazepam (ATIVAN) 1 MG tablet TAKE ONE TABLET BY MOUTH EVERY 6 HOURS AS NEEDED FOR ANXIETY FOR UP TO 30 DAYS 120 tablet 0    sertraline (ZOLOFT) 25 MG tablet Take 12.5 mg by mouth daily      albuterol sulfate HFA (PROVENTIL HFA) 108 (90 Base) MCG/ACT inhaler Inhale 2 puffs into the lungs every 6 hours as needed for Wheezing or Shortness of Breath 1 Inhaler 3    nitroGLYCERIN (NITROSTAT) 0.4 MG SL tablet Place 1 tablet under the tongue every 5 minutes as needed for Chest pain 25 tablet 3    Fiber POWD Take by mouth nightly      Multiple Vitamins-Minerals (I-DANIA) TABS Take 1 tablet by mouth 2 times daily      Propylene Glycol (SYSTANE BALANCE OP) Apply 1 drop to eye 3 times daily       acetaminophen (TYLENOL) 500 MG tablet Take 500 mg by mouth every 6 hours as needed for Pain.  Probiotic Product (PROBIOTIC DAILY PO) Take 1 tablet by mouth daily TruBiotics      Multiple Vitamins-Minerals (MULTIVITAMIN & MINERAL PO) Take 1 tablet by mouth daily.  Cholecalciferol (VITAMIN D3) 1000 UNITS TABS Take 1 tablet by mouth daily.  docusate sodium (COLACE) 100 MG capsule Take 100 mg by mouth 2 times daily       ascorbic acid (VITAMIN C) 500 MG tablet Take 1,000 mg by mouth daily.       Biotin 5000 MCG CAPS Take 2 tablets by mouth daily       ferrous gluconate (FERGON) 324 (38 Fe) MG tablet Take 324 mg by mouth daily (with breakfast)      oxybutynin (DITROPAN XL) 10 MG extended release tablet Take 1 tablet by mouth nightly (Patient not taking: Reported on 12/15/2020) 30 tablet 11 No current facility-administered medications for this visit. Allergies   Allergen Reactions    Acyclovir And Related Diarrhea and Nausea Only    Albuterol     Amitriptyline      Dizziness      Asa [Aspirin]      Stomach upset      Betadine [Povidone Iodine]     Biaxin [Clarithromycin] Nausea Only    Buspar [Buspirone]     Cefuroxime Axetil      Diarrhea      Celestone [Betamethasone]     Codeine     Darvocet A500 [Propoxyphene N-Acetaminophen]     Dicyclomine Hcl     Doxycycline Nausea Only    Esomeprazole Magnesium     Fluconazole     Lidex [Fluocinolone]     Neomycin     Pcn [Penicillins] Hives    Protonix [Pantoprazole]     Quinolones Hives    Statins [Statins]      Leg cramps      Tape [Adhesive Tape]     Tramadol      \"makes her walk into walls\"    Valium     Vicodin [Hydrocodone-Acetaminophen]     Zithromax [Azithromycin]      Nausea      Hydrocodone Nausea Only       Health Maintenance   Topic Date Due    Shingles Vaccine (1 of 2) [birth year]1977    Annual Wellness Visit (AWV)  05/29/2019    TSH testing  10/03/2020    Flu vaccine (1) 12/15/2021 (Originally 9/1/2020)    DTaP/Tdap/Td vaccine (2 - Tdap) 01/04/2037 (Originally 3/23/2008)    Pneumococcal 65+ years Vaccine  Completed    Hepatitis A vaccine  Aged Out    Hib vaccine  Aged Out    Meningococcal (ACWY) vaccine  Aged Out       Subjective:      Review of Systems   Constitutional: Negative for chills and fever. HENT: Negative for congestion and sore throat. Respiratory: Negative for chest tightness and shortness of breath. Cardiovascular: Negative for chest pain and leg swelling. Gastrointestinal: Negative for diarrhea, nausea and vomiting. Genitourinary: Negative for difficulty urinating and dysuria. Musculoskeletal: Negative for gait problem and myalgias. Neurological: Negative for dizziness and headaches. Psychiatric/Behavioral: Negative for confusion and decreased concentration.        Objective: Vitals:    12/15/20 1509   BP: 118/78   Site: Right Upper Arm   Position: Sitting   Cuff Size: Medium Adult   Pulse: 80   Resp: 20   Weight: 93 lb 3.2 oz (42.3 kg)   Height: 5' (1.524 m)     Physical Exam  Vitals signs and nursing note reviewed. Constitutional:       General: She is not in acute distress. Appearance: She is well-developed. HENT:      Head: Normocephalic and atraumatic. Right Ear: External ear normal.      Left Ear: External ear normal.      Ears:      Comments: Bilateral cerumen impaction  Eyes:      General: Lids are normal.      Extraocular Movements: Extraocular movements intact. Conjunctiva/sclera: Conjunctivae normal.   Neck:      Musculoskeletal: Neck supple. Thyroid: No thyromegaly. Cardiovascular:      Rate and Rhythm: Normal rate and regular rhythm. Heart sounds: No murmur. Pulmonary:      Effort: Pulmonary effort is normal. No accessory muscle usage or respiratory distress. Breath sounds: Normal breath sounds. No wheezing, rhonchi or rales. Abdominal:      Palpations: Abdomen is soft. Tenderness: There is no abdominal tenderness. There is no guarding or rebound. Musculoskeletal: Normal range of motion. Right lower leg: No edema. Left lower leg: No edema. Feet:      Comments: Toes show mild discoloration bilaterally, no signs of ischemia or necrosis. Warm B/L. Dorsalis pedis and posterior tibialis 2+ B/L  Lymphadenopathy:      Cervical: No cervical adenopathy. Skin:     General: Skin is warm and dry. Nails: There is no clubbing. Neurological:      Mental Status: She is alert. Cranial Nerves: No facial asymmetry. Coordination: Coordination normal.   Psychiatric:         Mood and Affect: Mood normal.         Speech: Speech normal.         Behavior: Behavior normal.         Assessment/Plan:        1.  Controlled type 2 diabetes mellitus without complication, without long-term current use of insulin ([address] stable - A1C 5.8, continue efforts at diet. - Comprehensive Metabolic Panel; Future  - Hemoglobin A1C; Future    2. Mixed hyperlipidemia, stable  - Continue efforts at diet  - Lipid Panel; Future    3. Coronary artery disease involving native coronary artery of native heart, angina presence unspecified, stable  - Asymptomatic. Continue to monitor    4. Other specified hypothyroidism,  stable  - Continue to monitor  - TSH With Reflex Ft4; Future    5. Other emphysema ([facility] stable  - Will start albuterol nebulizer PRN  - albuterol (PROVENTIL) (2.5 MG/3ML) 0.083% nebulizer solution; Take 3 mLs by nebulization every 6 hours as needed for Wheezing  Dispense: 120 each; Refill: 3  - Nebulizer Administration Set    6. Iron deficiency anemia secondary to inadequate dietary iron intake, stable  - Recheck CBC  - CBC Auto Differential; Future    7. Fatigue, unspecified type,  stable  - Continue to monitor    8. Major depressive disorder with single episode, in partial remission ([facility] stable  9. Anxiety, stable  - Reviewed risks and benefits of ativan as noted above    10. Primary osteoarthritis involving multiple joints,  stable  - Continue to monitor    11. Osteoporosis, unspecified osteoporosis type, unspecified pathological fracture presence,  stable  - Continue to monitor    12. Chronic constipation, stable  - Not currently problematic, continue to monitor    13. Chronic maxillary sinusitis,  stable  - Asymptomatic. Continue to monitor    14. Gastroesophageal reflux disease without esophagitis,  stable  - Continue to monitor    15. Overactive bladder,  stable  - Continue to monitor    16. At high risk for falls, stable  - As noted below    17. Gait instability, stable  - May consider walker    17. Bilateral impacted cerumen  - DE REMOVAL IMPACTED CERUMEN IRRIGATION/LVG UNILAT        Return in about 3 months (around 3/15/2021).     Orders Placed This Encounter   Procedures    CBC Auto Differential     Standing Status:   Future Standing Expiration Date:   12/15/2021    Comprehensive Metabolic Panel     Standing Status:   Future     Standing Expiration Date:   12/15/2021    Hemoglobin A1C     Standing Status:   Future     Standing Expiration Date:   12/15/2021    Lipid Panel     Standing Status:   Future     Standing Expiration Date:   12/15/2021     Order Specific Question:   Is Patient Fasting?/# of Hours     Answer:   8    TSH With Reflex Ft4     Standing Status:   Future     Standing Expiration Date:   12/15/2021    Nebulizer Administration Set    MD REMOVAL IMPACTED CERUMEN IRRIGATION/LVG UNILAT     Orders Placed This Encounter   Medications    albuterol (PROVENTIL) (2.5 MG/3ML) 0.083% nebulizer solution     Sig: Take 3 mLs by nebulization every 6 hours as needed for Wheezing     Dispense:  120 each     Refill:  3       All patient questions answered. Pt voiced understanding. Electronically signed by EUFEMIA De Oliveira on 12/15/2020 at 4:26 PM       On the basis of positive falls risk screening, assessment and plan is as follows: home safety tips provided.

## 2020-12-16 ENCOUNTER — TELEPHONE (OUTPATIENT)
Dept: INTERNAL MEDICINE | Age: 85
End: 2020-12-16
Payer: MEDICARE

## 2020-12-16 LAB
CHOLESTEROL/HDL RATIO: 2.3
CHOLESTEROL: 169 MG/DL
HDLC SERPL-MCNC: 73 MG/DL
LDL CHOLESTEROL: 79 MG/DL (ref 0–130)
TRIGL SERPL-MCNC: 85 MG/DL
VLDLC SERPL CALC-MCNC: NORMAL MG/DL (ref 1–30)

## 2020-12-16 PROCEDURE — G0180 MD CERTIFICATION HHA PATIENT: HCPCS | Performed by: INTERNAL MEDICINE

## 2020-12-23 ENCOUNTER — TELEPHONE (OUTPATIENT)
Dept: INTERNAL MEDICINE | Age: 85
End: 2020-12-23

## 2020-12-23 NOTE — TELEPHONE ENCOUNTER
Patients daughter called in stating that patient was supposed to have a nebulizer device sent to [facility], per daughter this has never been sent, she states [facility] received the solution but they do not have the device and again that this was to be sent to [facility]. Daughter states  its not urgent but they would like to have sent when possible. Please advise.

## 2020-12-30 ENCOUNTER — TELEPHONE (OUTPATIENT)
Dept: INTERNAL MEDICINE | Age: 85
End: 2020-12-30

## 2020-12-30 RX ORDER — MIRTAZAPINE 15 MG/1
7.5 TABLET, FILM COATED ORAL NIGHTLY
Qty: 30 TABLET | Refills: 3 | Status: SHIPPED | OUTPATIENT
Start: 2020-12-30 | End: 2022-01-01

## 2020-12-30 NOTE — TELEPHONE ENCOUNTER
Received call from [facility] Evangel[facility] Montiel Craw [phone])- daughter Wil Uriarte) is concerned; pt has not been eating much. She does the grocery shopping for pt, and not much has been eaten. Nurse and daughter are requesting to try an appetite stimulant. Send scrip to [facility]. Call daughter if med is ordered Wil Uriarte [phone]).

## 2020-12-30 NOTE — TELEPHONE ENCOUNTER
Given she has a history of depression and is currently on sertraline, I would advise to consider remeron. This is an antidepressant, with a side effect of increasing appetite/weight gain. Would advise to start with 1/2 tablet. Discontinue sertraline. Please let me know if there are any questions.

## 2021-01-01 ENCOUNTER — HOSPITAL ENCOUNTER (OUTPATIENT)
Dept: LAB | Age: 86
Discharge: HOME | End: 2021-07-13
Payer: MEDICARE

## 2021-01-01 ENCOUNTER — OUTSIDE SERVICES (OUTPATIENT)
Dept: INTERNAL MEDICINE | Age: 86
End: 2021-01-01
Payer: MEDICARE

## 2021-01-01 ENCOUNTER — TELEPHONE (OUTPATIENT)
Dept: INTERNAL MEDICINE | Age: 86
End: 2021-01-01

## 2021-01-01 ENCOUNTER — OFFICE VISIT (OUTPATIENT)
Dept: CARDIOLOGY | Age: 86
End: 2021-01-01
Payer: MEDICARE

## 2021-01-01 ENCOUNTER — HOSPITAL ENCOUNTER (EMERGENCY)
Age: 86
Discharge: HOME | End: 2021-09-27
Attending: EMERGENCY MEDICINE
Payer: MEDICARE

## 2021-01-01 ENCOUNTER — HOSPITAL ENCOUNTER (OUTPATIENT)
Dept: NON INVASIVE DIAGNOSTICS | Age: 86
Discharge: HOME | End: 2021-05-19
Payer: MEDICARE

## 2021-01-01 ENCOUNTER — OFFICE VISIT (OUTPATIENT)
Dept: INTERNAL MEDICINE | Age: 86
End: 2021-01-01
Payer: MEDICARE

## 2021-01-01 ENCOUNTER — TELEPHONE (OUTPATIENT)
Dept: CARDIOLOGY | Age: 86
End: 2021-01-01

## 2021-01-01 ENCOUNTER — APPOINTMENT (OUTPATIENT)
Dept: GENERAL RADIOLOGY | Age: 86
End: 2021-01-01
Payer: MEDICARE

## 2021-01-01 ENCOUNTER — OFFICE VISIT (OUTPATIENT)
Dept: OPTOMETRY | Age: 86
End: 2021-01-01
Payer: MEDICARE

## 2021-01-01 ENCOUNTER — TELEPHONE (OUTPATIENT)
Dept: INTERNAL MEDICINE | Age: 86
End: 2021-01-01
Payer: MEDICARE

## 2021-01-01 ENCOUNTER — APPOINTMENT (OUTPATIENT)
Dept: CT IMAGING | Age: 86
End: 2021-01-01
Payer: MEDICARE

## 2021-01-01 VITALS
HEART RATE: 85 BPM | SYSTOLIC BLOOD PRESSURE: 132 MMHG | OXYGEN SATURATION: 91 % | TEMPERATURE: 98.4 F | RESPIRATION RATE: 20 BRPM | DIASTOLIC BLOOD PRESSURE: 80 MMHG

## 2021-01-01 VITALS
HEIGHT: 60 IN | SYSTOLIC BLOOD PRESSURE: 102 MMHG | BODY MASS INDEX: 16.1 KG/M2 | HEART RATE: 89 BPM | DIASTOLIC BLOOD PRESSURE: 62 MMHG | WEIGHT: 82 LBS

## 2021-01-01 VITALS
DIASTOLIC BLOOD PRESSURE: 62 MMHG | HEART RATE: 69 BPM | SYSTOLIC BLOOD PRESSURE: 124 MMHG | RESPIRATION RATE: 18 BRPM | OXYGEN SATURATION: 93 % | TEMPERATURE: 97.6 F

## 2021-01-01 VITALS
WEIGHT: 88.6 LBS | TEMPERATURE: 97.5 F | DIASTOLIC BLOOD PRESSURE: 71 MMHG | HEART RATE: 80 BPM | RESPIRATION RATE: 16 BRPM | OXYGEN SATURATION: 94 % | HEIGHT: 60 IN | SYSTOLIC BLOOD PRESSURE: 121 MMHG | BODY MASS INDEX: 17.4 KG/M2

## 2021-01-01 VITALS
HEART RATE: 76 BPM | WEIGHT: 88.6 LBS | BODY MASS INDEX: 17.4 KG/M2 | DIASTOLIC BLOOD PRESSURE: 62 MMHG | TEMPERATURE: 97.3 F | HEIGHT: 60 IN | SYSTOLIC BLOOD PRESSURE: 110 MMHG

## 2021-01-01 VITALS
BODY MASS INDEX: 18.69 KG/M2 | HEIGHT: 60 IN | WEIGHT: 95.2 LBS | HEART RATE: 80 BPM | SYSTOLIC BLOOD PRESSURE: 120 MMHG | DIASTOLIC BLOOD PRESSURE: 82 MMHG | RESPIRATION RATE: 20 BRPM

## 2021-01-01 VITALS
SYSTOLIC BLOOD PRESSURE: 125 MMHG | TEMPERATURE: 97.5 F | DIASTOLIC BLOOD PRESSURE: 82 MMHG | HEART RATE: 93 BPM | OXYGEN SATURATION: 90 %

## 2021-01-01 VITALS — SYSTOLIC BLOOD PRESSURE: 140 MMHG | BODY MASS INDEX: 18.36 KG/M2 | WEIGHT: 94 LBS | DIASTOLIC BLOOD PRESSURE: 64 MMHG

## 2021-01-01 DIAGNOSIS — I50.9 CHF NYHA CLASS I, UNSPECIFIED FAILURE CHRONICITY, UNSPECIFIED TYPE (HCC): ICD-10-CM

## 2021-01-01 DIAGNOSIS — I50.9 CHF NYHA CLASS I, UNSPECIFIED FAILURE CHRONICITY, UNSPECIFIED TYPE (HCC): Primary | ICD-10-CM

## 2021-01-01 DIAGNOSIS — E11.9 CONTROLLED TYPE 2 DIABETES MELLITUS WITHOUT COMPLICATION, WITHOUT LONG-TERM CURRENT USE OF INSULIN (HCC): Primary | ICD-10-CM

## 2021-01-01 DIAGNOSIS — R29.6 MULTIPLE FALLS: ICD-10-CM

## 2021-01-01 DIAGNOSIS — E78.2 MIXED HYPERLIPIDEMIA: ICD-10-CM

## 2021-01-01 DIAGNOSIS — I50.32 CHRONIC DIASTOLIC HEART FAILURE (HCC): ICD-10-CM

## 2021-01-01 DIAGNOSIS — M15.9 PRIMARY OSTEOARTHRITIS INVOLVING MULTIPLE JOINTS: ICD-10-CM

## 2021-01-01 DIAGNOSIS — E11.9 INSULIN DEPENDENT TYPE 2 DIABETES MELLITUS (HCC): ICD-10-CM

## 2021-01-01 DIAGNOSIS — R29.6 MULTIPLE FALLS: Primary | ICD-10-CM

## 2021-01-01 DIAGNOSIS — R94.31 ABNORMAL EKG: ICD-10-CM

## 2021-01-01 DIAGNOSIS — J32.0 CHRONIC MAXILLARY SINUSITIS: ICD-10-CM

## 2021-01-01 DIAGNOSIS — F32.4 MAJOR DEPRESSIVE DISORDER WITH SINGLE EPISODE, IN PARTIAL REMISSION (HCC): ICD-10-CM

## 2021-01-01 DIAGNOSIS — E11.9 CONTROLLED TYPE 2 DIABETES MELLITUS WITHOUT COMPLICATION, WITHOUT LONG-TERM CURRENT USE OF INSULIN (HCC): ICD-10-CM

## 2021-01-01 DIAGNOSIS — R41.0 CONFUSION: Primary | ICD-10-CM

## 2021-01-01 DIAGNOSIS — R29.6 FREQUENT FALLS: ICD-10-CM

## 2021-01-01 DIAGNOSIS — M79.89 BILATERAL SWELLING OF FEET: ICD-10-CM

## 2021-01-01 DIAGNOSIS — M81.0 OSTEOPOROSIS, UNSPECIFIED OSTEOPOROSIS TYPE, UNSPECIFIED PATHOLOGICAL FRACTURE PRESENCE: ICD-10-CM

## 2021-01-01 DIAGNOSIS — R26.81 GAIT INSTABILITY: ICD-10-CM

## 2021-01-01 DIAGNOSIS — F41.9 ANXIETY: Primary | ICD-10-CM

## 2021-01-01 DIAGNOSIS — F41.9 ANXIETY: ICD-10-CM

## 2021-01-01 DIAGNOSIS — K21.9 GASTROESOPHAGEAL REFLUX DISEASE WITHOUT ESOPHAGITIS: ICD-10-CM

## 2021-01-01 DIAGNOSIS — J43.8 OTHER EMPHYSEMA (HCC): ICD-10-CM

## 2021-01-01 DIAGNOSIS — J43.8 OTHER EMPHYSEMA (HCC): Primary | ICD-10-CM

## 2021-01-01 DIAGNOSIS — K59.09 CHRONIC CONSTIPATION: ICD-10-CM

## 2021-01-01 DIAGNOSIS — R63.0 DECREASED APPETITE: ICD-10-CM

## 2021-01-01 DIAGNOSIS — I34.0 NONRHEUMATIC MITRAL VALVE REGURGITATION: ICD-10-CM

## 2021-01-01 DIAGNOSIS — H53.8 BLURRED VISION, BILATERAL: Primary | ICD-10-CM

## 2021-01-01 DIAGNOSIS — S42.201K CLOSED FRACTURE OF PROXIMAL END OF RIGHT HUMERUS WITH NONUNION, UNSPECIFIED FRACTURE MORPHOLOGY, SUBSEQUENT ENCOUNTER: ICD-10-CM

## 2021-01-01 DIAGNOSIS — I07.1 TRICUSPID VALVE INSUFFICIENCY, UNSPECIFIED ETIOLOGY: ICD-10-CM

## 2021-01-01 DIAGNOSIS — Z79.4 INSULIN DEPENDENT TYPE 2 DIABETES MELLITUS (HCC): ICD-10-CM

## 2021-01-01 DIAGNOSIS — R06.09 DOE (DYSPNEA ON EXERTION): ICD-10-CM

## 2021-01-01 DIAGNOSIS — E03.8 OTHER SPECIFIED HYPOTHYROIDISM: ICD-10-CM
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
## 2021-01-01 DIAGNOSIS — D50.8 IRON DEFICIENCY ANEMIA SECONDARY TO INADEQUATE DIETARY IRON INTAKE: ICD-10-CM

## 2021-01-01 DIAGNOSIS — J44.1 COPD WITH ACUTE EXACERBATION (HCC): Primary | ICD-10-CM

## 2021-01-01 DIAGNOSIS — I35.1 AORTIC VALVE INSUFFICIENCY, ETIOLOGY OF CARDIAC VALVE DISEASE UNSPECIFIED: ICD-10-CM

## 2021-01-01 DIAGNOSIS — I25.10 CORONARY ARTERY DISEASE INVOLVING NATIVE CORONARY ARTERY OF NATIVE HEART, UNSPECIFIED WHETHER ANGINA PRESENT: ICD-10-CM

## 2021-01-01 DIAGNOSIS — I10 ESSENTIAL HYPERTENSION: ICD-10-CM

## 2021-01-01 DIAGNOSIS — I50.30 DIASTOLIC HEART FAILURE, UNSPECIFIED HF CHRONICITY (HCC): ICD-10-CM

## 2021-01-01 DIAGNOSIS — H35.30 ARMD (AGE RELATED MACULAR DEGENERATION): ICD-10-CM

## 2021-01-01 DIAGNOSIS — H61.22 IMPACTED CERUMEN OF LEFT EAR: ICD-10-CM

## 2021-01-01 LAB
-: ABNORMAL
ABSOLUTE EOS #: 0.29 K/UL (ref 0–0.44)
ABSOLUTE EOS #: 0.33 K/UL (ref 0–0.44)
ABSOLUTE IMMATURE GRANULOCYTE: 0.04 K/UL (ref 0–0.3)
ABSOLUTE IMMATURE GRANULOCYTE: 0.1 K/UL (ref 0–0.3)
ABSOLUTE LYMPH #: 1.75 K/UL (ref 1.1–3.7)
ABSOLUTE LYMPH #: 1.84 K/UL (ref 1.1–3.7)
ABSOLUTE MONO #: 0.93 K/UL (ref 0.1–1.2)
ABSOLUTE MONO #: 1.26 K/UL (ref 0.1–1.2)
ALBUMIN SERPL-MCNC: 4.3 G/DL (ref 3.5–5.2)
ALBUMIN/GLOBULIN RATIO: 1.4 (ref 1–2.5)
ALP BLD-CCNC: 89 U/L (ref 35–104)
ALT SERPL-CCNC: 27 U/L (ref 5–33)
AMORPHOUS: ABNORMAL
ANION GAP SERPL CALCULATED.3IONS-SCNC: 11 MMOL/L (ref 9–17)
ANION GAP SERPL CALCULATED.3IONS-SCNC: 9 MMOL/L (ref 9–17)
AST SERPL-CCNC: 51 U/L
BACTERIA: ABNORMAL
BASOPHILS # BLD: 1 % (ref 0–2)
BASOPHILS # BLD: 2 % (ref 0–2)
BASOPHILS ABSOLUTE: 0.1 K/UL (ref 0–0.2)
BASOPHILS ABSOLUTE: 0.12 K/UL (ref 0–0.2)
BILIRUB SERPL-MCNC: 0.25 MG/DL (ref 0.3–1.2)
BILIRUBIN URINE: NEGATIVE
BUN BLDV-MCNC: 17 MG/DL (ref 8–23)
BUN BLDV-MCNC: 29 MG/DL (ref 8–23)
BUN/CREAT BLD: 24 (ref 9–20)
BUN/CREAT BLD: 27 (ref 9–20)
CALCIUM SERPL-MCNC: 10.1 MG/DL (ref 8.6–10.4)
CALCIUM SERPL-MCNC: 9.5 MG/DL (ref 8.6–10.4)
CASTS UA: ABNORMAL /LPF (ref 0–2)
CHLORIDE BLD-SCNC: 101 MMOL/L (ref 98–107)
CHLORIDE BLD-SCNC: 97 MMOL/L (ref 98–107)
CO2: 29 MMOL/L (ref 20–31)
CO2: 30 MMOL/L (ref 20–31)
COLOR: ABNORMAL
COMMENT UA: ABNORMAL
CREAT SERPL-MCNC: 0.72 MG/DL (ref 0.5–0.9)
CREAT SERPL-MCNC: 1.07 MG/DL (ref 0.5–0.9)
CRYSTALS, UA: ABNORMAL /HPF
DIFFERENTIAL TYPE: ABNORMAL
DIFFERENTIAL TYPE: ABNORMAL
EOSINOPHILS RELATIVE PERCENT: 3 % (ref 1–4)
EOSINOPHILS RELATIVE PERCENT: 4 % (ref 1–4)
EPITHELIAL CELLS UA: ABNORMAL /HPF (ref 0–5)
GFR AFRICAN AMERICAN: 58 ML/MIN
GFR AFRICAN AMERICAN: >60 ML/MIN
GFR NON-AFRICAN AMERICAN: 48 ML/MIN
GFR NON-AFRICAN AMERICAN: >60 ML/MIN
GFR SERPL CREATININE-BSD FRML MDRD: ABNORMAL ML/MIN/{1.73_M2}
GLUCOSE BLD-MCNC: 108 MG/DL (ref 70–99)
GLUCOSE BLD-MCNC: 90 MG/DL (ref 70–99)
GLUCOSE URINE: NEGATIVE
HCT VFR BLD CALC: 29.2 % (ref 36.3–47.1)
HCT VFR BLD CALC: 34 % (ref 36.3–47.1)
HEMOGLOBIN: 10 G/DL (ref 11.9–15.1)
HEMOGLOBIN: 8.5 G/DL (ref 11.9–15.1)
IMMATURE GRANULOCYTES: 1 %
IMMATURE GRANULOCYTES: 1 %
KETONES, URINE: ABNORMAL
LEUKOCYTE ESTERASE, URINE: NEGATIVE
LV EF: 55 %
LVEF MODALITY: NORMAL
LYMPHOCYTES # BLD: 18 % (ref 24–43)
LYMPHOCYTES # BLD: 23 % (ref 24–43)
MCH RBC QN AUTO: 22.5 PG (ref 25.2–33.5)
MCH RBC QN AUTO: 24.2 PG (ref 25.2–33.5)
MCHC RBC AUTO-ENTMCNC: 29.1 G/DL (ref 25.2–33.5)
MCHC RBC AUTO-ENTMCNC: 29.4 G/DL (ref 25.2–33.5)
MCV RBC AUTO: 77.2 FL (ref 82.6–102.9)
MCV RBC AUTO: 82.1 FL (ref 82.6–102.9)
MONOCYTES # BLD: 12 % (ref 3–12)
MONOCYTES # BLD: 13 % (ref 3–12)
MORPHOLOGY: ABNORMAL
MUCUS: ABNORMAL
NITRITE, URINE: NEGATIVE
NRBC AUTOMATED: 0 PER 100 WBC
NRBC AUTOMATED: 0 PER 100 WBC
OTHER OBSERVATIONS UA: ABNORMAL
PDW BLD-RTO: 17.6 % (ref 11.8–14.4)
PDW BLD-RTO: 20.6 % (ref 11.8–14.4)
PH UA: 5.5 (ref 5–6)
PLATELET # BLD: 378 K/UL (ref 138–453)
PLATELET # BLD: ABNORMAL K/UL (ref 138–453)
PLATELET ESTIMATE: ABNORMAL
PLATELET ESTIMATE: ABNORMAL
PLATELET, FLUORESCENCE: 392 K/UL (ref 138–453)
PLATELET, IMMATURE FRACTION: 6.5 % (ref 1.1–10.3)
PMV BLD AUTO: 9 FL (ref 8.1–13.5)
PMV BLD AUTO: ABNORMAL FL (ref 8.1–13.5)
POTASSIUM SERPL-SCNC: 4.6 MMOL/L (ref 3.7–5.3)
POTASSIUM SERPL-SCNC: 5 MMOL/L (ref 3.7–5.3)
PROTEIN UA: NEGATIVE
RBC # BLD: 3.78 M/UL (ref 3.95–5.11)
RBC # BLD: 4.14 M/UL (ref 3.95–5.11)
RBC # BLD: ABNORMAL 10*6/UL
RBC # BLD: ABNORMAL 10*6/UL
RBC UA: ABNORMAL /HPF (ref 0–4)
RENAL EPITHELIAL, UA: ABNORMAL /HPF
SEG NEUTROPHILS: 58 % (ref 36–65)
SEG NEUTROPHILS: 64 % (ref 36–65)
SEGMENTED NEUTROPHILS ABSOLUTE COUNT: 4.66 K/UL (ref 1.5–8.1)
SEGMENTED NEUTROPHILS ABSOLUTE COUNT: 6.2 K/UL (ref 1.5–8.1)
SODIUM BLD-SCNC: 138 MMOL/L (ref 135–144)
SODIUM BLD-SCNC: 139 MMOL/L (ref 135–144)
SPECIFIC GRAVITY UA: 1.02 (ref 1.01–1.02)
TOTAL PROTEIN: 7.4 G/DL (ref 6.4–8.3)
TRICHOMONAS: ABNORMAL
TURBIDITY: ABNORMAL
URINE HGB: NEGATIVE
UROBILINOGEN, URINE: NORMAL
WBC # BLD: 7.9 K/UL (ref 3.5–11.3)
WBC # BLD: 9.7 K/UL (ref 3.5–11.3)
WBC # BLD: ABNORMAL 10*3/UL
WBC # BLD: ABNORMAL 10*3/UL
WBC UA: ABNORMAL /HPF (ref 0–4)
YEAST: ABNORMAL

## 2021-01-01 PROCEDURE — 4040F PNEUMOC VAC/ADMIN/RCVD: CPT | Performed by: INTERNAL MEDICINE

## 2021-01-01 PROCEDURE — 99214 OFFICE O/P EST MOD 30 MIN: CPT | Performed by: NURSE PRACTITIONER

## 2021-01-01 PROCEDURE — 1090F PRES/ABSN URINE INCON ASSESS: CPT | Performed by: NURSE PRACTITIONER

## 2021-01-01 PROCEDURE — 1123F ACP DISCUSS/DSCN MKR DOCD: CPT | Performed by: INTERNAL MEDICINE

## 2021-01-01 PROCEDURE — 99214 OFFICE O/P EST MOD 30 MIN: CPT | Performed by: OPTOMETRIST

## 2021-01-01 PROCEDURE — 4004F PT TOBACCO SCREEN RCVD TLK: CPT | Performed by: NURSE PRACTITIONER

## 2021-01-01 PROCEDURE — 85025 COMPLETE CBC W/AUTO DIFF WBC: CPT

## 2021-01-01 PROCEDURE — 36415 COLL VENOUS BLD VENIPUNCTURE: CPT

## 2021-01-01 PROCEDURE — 99213 OFFICE O/P EST LOW 20 MIN: CPT | Performed by: INTERNAL MEDICINE

## 2021-01-01 PROCEDURE — G8427 DOCREV CUR MEDS BY ELIG CLIN: HCPCS | Performed by: INTERNAL MEDICINE

## 2021-01-01 PROCEDURE — G8419 CALC BMI OUT NRM PARAM NOF/U: HCPCS | Performed by: INTERNAL MEDICINE

## 2021-01-01 PROCEDURE — G8926 SPIRO NO PERF OR DOC: HCPCS | Performed by: NURSE PRACTITIONER

## 2021-01-01 PROCEDURE — 1123F ACP DISCUSS/DSCN MKR DOCD: CPT | Performed by: NURSE PRACTITIONER

## 2021-01-01 PROCEDURE — 72072 X-RAY EXAM THORAC SPINE 3VWS: CPT

## 2021-01-01 PROCEDURE — G8484 FLU IMMUNIZE NO ADMIN: HCPCS | Performed by: OPTOMETRIST

## 2021-01-01 PROCEDURE — 70450 CT HEAD/BRAIN W/O DYE: CPT

## 2021-01-01 PROCEDURE — 99212 OFFICE O/P EST SF 10 MIN: CPT

## 2021-01-01 PROCEDURE — G8427 DOCREV CUR MEDS BY ELIG CLIN: HCPCS | Performed by: NURSE PRACTITIONER

## 2021-01-01 PROCEDURE — G8427 DOCREV CUR MEDS BY ELIG CLIN: HCPCS | Performed by: OPTOMETRIST

## 2021-01-01 PROCEDURE — 93005 ELECTROCARDIOGRAM TRACING: CPT | Performed by: INTERNAL MEDICINE

## 2021-01-01 PROCEDURE — G8420 CALC BMI NORM PARAMETERS: HCPCS | Performed by: NURSE PRACTITIONER

## 2021-01-01 PROCEDURE — 1090F PRES/ABSN URINE INCON ASSESS: CPT | Performed by: OPTOMETRIST

## 2021-01-01 PROCEDURE — 99283 EMERGENCY DEPT VISIT LOW MDM: CPT

## 2021-01-01 PROCEDURE — 1123F ACP DISCUSS/DSCN MKR DOCD: CPT | Performed by: OPTOMETRIST

## 2021-01-01 PROCEDURE — 69209 REMOVE IMPACTED EAR WAX UNI: CPT | Performed by: NURSE PRACTITIONER

## 2021-01-01 PROCEDURE — 85055 RETICULATED PLATELET ASSAY: CPT

## 2021-01-01 PROCEDURE — G8484 FLU IMMUNIZE NO ADMIN: HCPCS | Performed by: INTERNAL MEDICINE

## 2021-01-01 PROCEDURE — G8419 CALC BMI OUT NRM PARAM NOF/U: HCPCS | Performed by: NURSE PRACTITIONER

## 2021-01-01 PROCEDURE — 99204 OFFICE O/P NEW MOD 45 MIN: CPT | Performed by: INTERNAL MEDICINE

## 2021-01-01 PROCEDURE — 4040F PNEUMOC VAC/ADMIN/RCVD: CPT | Performed by: OPTOMETRIST

## 2021-01-01 PROCEDURE — 1090F PRES/ABSN URINE INCON ASSESS: CPT | Performed by: INTERNAL MEDICINE

## 2021-01-01 PROCEDURE — 4004F PT TOBACCO SCREEN RCVD TLK: CPT | Performed by: INTERNAL MEDICINE

## 2021-01-01 PROCEDURE — G8419 CALC BMI OUT NRM PARAM NOF/U: HCPCS | Performed by: OPTOMETRIST

## 2021-01-01 PROCEDURE — 0518F FALL PLAN OF CARE DOCD: CPT | Performed by: INTERNAL MEDICINE

## 2021-01-01 PROCEDURE — 3288F FALL RISK ASSESSMENT DOCD: CPT | Performed by: INTERNAL MEDICINE

## 2021-01-01 PROCEDURE — 80053 COMPREHEN METABOLIC PANEL: CPT

## 2021-01-01 PROCEDURE — 3023F SPIROM DOC REV: CPT | Performed by: NURSE PRACTITIONER

## 2021-01-01 PROCEDURE — 4040F PNEUMOC VAC/ADMIN/RCVD: CPT | Performed by: NURSE PRACTITIONER

## 2021-01-01 PROCEDURE — 93306 TTE W/DOPPLER COMPLETE: CPT

## 2021-01-01 PROCEDURE — 81001 URINALYSIS AUTO W/SCOPE: CPT

## 2021-01-01 PROCEDURE — 99214 OFFICE O/P EST MOD 30 MIN: CPT | Performed by: INTERNAL MEDICINE

## 2021-01-01 PROCEDURE — 96361 HYDRATE IV INFUSION ADD-ON: CPT

## 2021-01-01 PROCEDURE — 80048 BASIC METABOLIC PNL TOTAL CA: CPT

## 2021-01-01 PROCEDURE — 3288F FALL RISK ASSESSMENT DOCD: CPT | Performed by: NURSE PRACTITIONER

## 2021-01-01 PROCEDURE — 93010 ELECTROCARDIOGRAM REPORT: CPT | Performed by: INTERNAL MEDICINE

## 2021-01-01 PROCEDURE — 0518F FALL PLAN OF CARE DOCD: CPT | Performed by: NURSE PRACTITIONER

## 2021-01-01 PROCEDURE — 4004F PT TOBACCO SCREEN RCVD TLK: CPT | Performed by: OPTOMETRIST

## 2021-01-01 PROCEDURE — 96360 HYDRATION IV INFUSION INIT: CPT

## 2021-01-01 PROCEDURE — 2580000003 HC RX 258: Performed by: EMERGENCY MEDICINE

## 2021-01-01 PROCEDURE — G0180 MD CERTIFICATION HHA PATIENT: HCPCS | Performed by: NURSE PRACTITIONER

## 2021-01-01 RX ORDER — LORAZEPAM 1 MG/1
0.5 TABLET ORAL EVERY 4 HOURS PRN
Qty: 90 TABLET | Refills: 0
Start: 2021-01-01 | End: 2021-01-01 | Stop reason: SDUPTHER

## 2021-01-01 RX ORDER — LORAZEPAM 1 MG/1
TABLET ORAL
Qty: 120 TABLET | Refills: 0 | Status: SHIPPED | OUTPATIENT
Start: 2021-01-01 | End: 2021-01-01

## 2021-01-01 RX ORDER — LORAZEPAM 1 MG/1
0.5 TABLET ORAL EVERY 4 HOURS PRN
Qty: 90 TABLET | Refills: 0 | Status: SHIPPED | OUTPATIENT
Start: 2021-01-01 | End: 2021-01-01

## 2021-01-01 RX ORDER — LORAZEPAM 1 MG/1
0.5 TABLET ORAL EVERY 6 HOURS PRN
COMMUNITY
End: 2021-01-01

## 2021-01-01 RX ORDER — FAMOTIDINE 20 MG/1
20 TABLET, FILM COATED ORAL DAILY
Qty: 60 TABLET | Refills: 3 | Status: SHIPPED | OUTPATIENT
Start: 2021-01-01 | End: 2022-01-01 | Stop reason: ALTCHOICE

## 2021-01-01 RX ORDER — LORAZEPAM 1 MG/1
TABLET ORAL
Qty: 120 TABLET | Refills: 0 | Status: SHIPPED | OUTPATIENT
Start: 2021-01-01 | End: 2021-01-01 | Stop reason: SDUPTHER

## 2021-01-01 RX ORDER — ACETAMINOPHEN 160 MG
2000 TABLET,DISINTEGRATING ORAL DAILY
COMMUNITY
End: 2022-01-01 | Stop reason: ALTCHOICE

## 2021-01-01 RX ORDER — 0.9 % SODIUM CHLORIDE 0.9 %
500 INTRAVENOUS SOLUTION INTRAVENOUS ONCE
Status: COMPLETED | OUTPATIENT
Start: 2021-01-01 | End: 2021-01-01

## 2021-01-01 RX ORDER — LORAZEPAM 0.5 MG/1
0.5 TABLET ORAL EVERY 4 HOURS PRN
COMMUNITY
Start: 2021-01-01 | End: 2021-01-01

## 2021-01-01 RX ORDER — HYDROCHLOROTHIAZIDE 25 MG/1
TABLET ORAL
Qty: 90 TABLET | Refills: 3 | Status: SHIPPED | OUTPATIENT
Start: 2021-01-01 | End: 2022-01-01 | Stop reason: ALTCHOICE

## 2021-01-01 RX ORDER — LORAZEPAM 1 MG/1
TABLET ORAL
Qty: 120 TABLET | Refills: 0 | Status: SHIPPED | OUTPATIENT
Start: 2021-01-01 | End: 2021-01-01 | Stop reason: DRUGHIGH

## 2021-01-01 RX ORDER — LORAZEPAM 0.5 MG/1
TABLET ORAL
Qty: 90 TABLET | Refills: 0 | Status: SHIPPED | OUTPATIENT
Start: 2021-01-01 | End: 2022-01-01

## 2021-01-01 RX ORDER — HYDROCHLOROTHIAZIDE 25 MG/1
25 TABLET ORAL EVERY MORNING
Qty: 90 TABLET | Refills: 1 | Status: SHIPPED | OUTPATIENT
Start: 2021-01-01 | End: 2021-01-01

## 2021-01-01 RX ORDER — DOXYCYCLINE HYCLATE 100 MG
100 TABLET ORAL 2 TIMES DAILY
Qty: 14 TABLET | Refills: 0 | Status: SHIPPED | OUTPATIENT
Start: 2021-01-01 | End: 2021-01-01

## 2021-01-01 RX ORDER — TIOTROPIUM BROMIDE 18 UG/1
18 CAPSULE ORAL; RESPIRATORY (INHALATION) DAILY
Qty: 90 CAPSULE | Refills: 1 | Status: SHIPPED | OUTPATIENT
Start: 2021-01-01 | End: 2022-01-01 | Stop reason: ALTCHOICE

## 2021-01-01 RX ORDER — TROPICAMIDE 10 MG/ML
1 SOLUTION/ DROPS OPHTHALMIC ONCE
Status: COMPLETED | OUTPATIENT
Start: 2021-01-01 | End: 2021-01-01

## 2021-01-01 RX ADMIN — SODIUM CHLORIDE 500 ML: 9 INJECTION, SOLUTION INTRAVENOUS at 15:46

## 2021-01-01 RX ADMIN — TROPICAMIDE 1 DROP: 10 SOLUTION/ DROPS OPHTHALMIC at 13:40

## 2021-01-01 ASSESSMENT — ENCOUNTER SYMPTOMS
NAUSEA: 0
VOMITING: 0
DIARRHEA: 0
GASTROINTESTINAL NEGATIVE: 0
ALLERGIC/IMMUNOLOGIC NEGATIVE: 0
WHEEZING: 0
RHINORRHEA: 0
RESPIRATORY NEGATIVE: 0
VOMITING: 0
DIARRHEA: 0
VOMITING: 0
RHINORRHEA: 0
DIARRHEA: 0
COUGH: 1
NAUSEA: 0
GASTROINTESTINAL NEGATIVE: 1
CHEST TIGHTNESS: 0
BACK PAIN: 1
CHEST TIGHTNESS: 0
SHORTNESS OF BREATH: 0
SORE THROAT: 0
VOMITING: 0
SHORTNESS OF BREATH: 1
SHORTNESS OF BREATH: 0
SHORTNESS OF BREATH: 0
WHEEZING: 0
DIARRHEA: 0
NAUSEA: 0
EYES NEGATIVE: 0
NAUSEA: 0
SORE THROAT: 0
COUGH: 0

## 2021-01-01 ASSESSMENT — REFRACTION_MANIFEST
OD_CYLINDER: -1..25
OS_ADD: +2.50
OS_AXIS: 132
OS_SPHERE: PLANO
OD_SPHERE: +0.50
OD_AXIS: 077
OD_ADD: +2.50
OS_CYLINDER: -1.50

## 2021-01-01 ASSESSMENT — SLIT LAMP EXAM - LIDS
COMMENTS: 2+ DERMATOCHALASIS - UPPER LID
COMMENTS: 2+ DERMATOCHALASIS - UPPER LID

## 2021-01-01 ASSESSMENT — REFRACTION_WEARINGRX
OD_HPRISM: 5.5 BO
OS_CYLINDER: -1.75
OD_ADD: +2.50
OS_HPRISM: 5.5 BO
OS_ADD: +2.50
OS_SPHERE: +1.25
OD_SPHERE: +1.00
SPECS_TYPE: BIFOCAL
OD_CYLINDER: -1.50
OD_AXIS: 080
OS_AXIS: 106

## 2021-01-01 ASSESSMENT — KERATOMETRY
METHOD_AUTO_MANUAL: AUTOMATED
OS_AXISANGLE2_DEGREES: 141
OS_AXISANGLE_DEGREES: 051
OS_K2POWER_DIOPTERS: 46.75
OD_K1POWER_DIOPTERS: ERROR
OS_K1POWER_DIOPTERS: 46.00

## 2021-01-01 ASSESSMENT — VISUAL ACUITY
OS_CC: 20/400
CORRECTION_TYPE: GLASSES
METHOD: SNELLEN - LINEAR

## 2021-01-06 DIAGNOSIS — F41.9 ANXIETY: ICD-10-CM

## 2021-01-07 RX ORDER — LORAZEPAM 1 MG/1
TABLET ORAL
Qty: 120 TABLET | Refills: 0 | Status: SHIPPED | OUTPATIENT
Start: 2021-01-07 | End: 2021-02-05 | Stop reason: SDUPTHER

## 2021-01-20 ENCOUNTER — TELEPHONE (OUTPATIENT)
Dept: INTERNAL MEDICINE | Age: 86
End: 2021-01-20

## 2021-01-20 DIAGNOSIS — M15.9 PRIMARY OSTEOARTHRITIS INVOLVING MULTIPLE JOINTS: Primary | ICD-10-CM

## 2021-01-20 DIAGNOSIS — M81.0 OSTEOPOROSIS, UNSPECIFIED OSTEOPOROSIS TYPE, UNSPECIFIED PATHOLOGICAL FRACTURE PRESENCE: ICD-10-CM

## 2021-01-20 DIAGNOSIS — J43.8 OTHER EMPHYSEMA (HCC): ICD-10-CM
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
## 2021-01-21 RX ORDER — CALCIUM CARBONATE 160(400)MG
TABLET,CHEWABLE ORAL
Qty: 1 EACH | Refills: 0 | Status: CANCELLED | OUTPATIENT
Start: 2021-01-21

## 2021-01-21 NOTE — TELEPHONE ENCOUNTER
Note has been addended, as we did discuss her risk for falls and gait instability at the time. I did sign the form while in the office, do I need to wait to sign order while in the office despite signing the additional paperwork?

## 2021-02-01 ENCOUNTER — TELEPHONE (OUTPATIENT)
Dept: INTERNAL MEDICINE | Age: 86
End: 2021-02-01
Payer: MEDICARE

## 2021-02-01 DIAGNOSIS — H74.8X1 OTHER SPECIFIED DISORDERS OF RIGHT MIDDLE EAR AND MASTOID: ICD-10-CM

## 2021-02-01 DIAGNOSIS — S42.201K CLOSED FRACTURE OF PROXIMAL END OF RIGHT HUMERUS WITH NONUNION, UNSPECIFIED FRACTURE MORPHOLOGY, SUBSEQUENT ENCOUNTER: Primary | ICD-10-CM

## 2021-02-01 DIAGNOSIS — M47.816 SPONDYLOSIS WITHOUT MYELOPATHY OR RADICULOPATHY, LUMBAR REGION: ICD-10-CM

## 2021-02-01 DIAGNOSIS — M41.9 SCOLIOSIS, UNSPECIFIED SCOLIOSIS TYPE, UNSPECIFIED SPINAL REGION: ICD-10-CM

## 2021-02-01 PROCEDURE — G0179 MD RECERTIFICATION HHA PT: HCPCS | Performed by: INTERNAL MEDICINE

## 2021-02-03 DIAGNOSIS — F41.9 ANXIETY: ICD-10-CM

## 2021-02-03 NOTE — TELEPHONE ENCOUNTER
Patient called in requesting a refill on ativan sent to [facility]. Medication pended if agreeable.      Last Appt:  12/15/2020  Next Appt:   3/16/2021  Med verified in Epic

## 2021-02-05 RX ORDER — LORAZEPAM 1 MG/1
TABLET ORAL
Qty: 120 TABLET | Refills: 0 | Status: SHIPPED | OUTPATIENT
Start: 2021-02-05 | End: 2021-03-09 | Stop reason: SDUPTHER

## 2021-03-09 DIAGNOSIS — F41.9 ANXIETY: ICD-10-CM

## 2021-03-09 RX ORDER — LORAZEPAM 1 MG/1
TABLET ORAL
Qty: 120 TABLET | Refills: 0 | Status: SHIPPED | OUTPATIENT
Start: 2021-03-09 | End: 2021-04-06

## 2021-03-09 NOTE — TELEPHONE ENCOUNTER
Patient called in requesting a refill on ativan sent to [facility]     Medication pended if agreeable.      Last Appt:  12/15/2020  Next Appt:   3/16/2021  Med verified in Epic

## 2021-03-16 ENCOUNTER — OFFICE VISIT (OUTPATIENT)
Dept: INTERNAL MEDICINE | Age: 86
End: 2021-03-16
Payer: MEDICARE

## 2021-03-16 VITALS
HEIGHT: 60 IN | WEIGHT: 90.4 LBS | DIASTOLIC BLOOD PRESSURE: 84 MMHG | RESPIRATION RATE: 20 BRPM | SYSTOLIC BLOOD PRESSURE: 120 MMHG | HEART RATE: 80 BPM | BODY MASS INDEX: 17.75 KG/M2

## 2021-03-16 DIAGNOSIS — K21.9 GASTROESOPHAGEAL REFLUX DISEASE WITHOUT ESOPHAGITIS: ICD-10-CM

## 2021-03-16 DIAGNOSIS — E78.2 MIXED HYPERLIPIDEMIA: ICD-10-CM

## 2021-03-16 DIAGNOSIS — Z00.00 ROUTINE GENERAL MEDICAL EXAMINATION AT A HEALTH CARE FACILITY: Primary | ICD-10-CM

## 2021-03-16 DIAGNOSIS — K59.09 CHRONIC CONSTIPATION: ICD-10-CM

## 2021-03-16 DIAGNOSIS — D50.8 IRON DEFICIENCY ANEMIA SECONDARY TO INADEQUATE DIETARY IRON INTAKE: ICD-10-CM

## 2021-03-16 DIAGNOSIS — E03.8 OTHER SPECIFIED HYPOTHYROIDISM: ICD-10-CM

## 2021-03-16 DIAGNOSIS — J32.0 CHRONIC MAXILLARY SINUSITIS: ICD-10-CM

## 2021-03-16 DIAGNOSIS — M15.9 PRIMARY OSTEOARTHRITIS INVOLVING MULTIPLE JOINTS: ICD-10-CM

## 2021-03-16 DIAGNOSIS — F41.9 ANXIETY: ICD-10-CM

## 2021-03-16 DIAGNOSIS — E11.9 CONTROLLED TYPE 2 DIABETES MELLITUS WITHOUT COMPLICATION, WITHOUT LONG-TERM CURRENT USE OF INSULIN (HCC): ICD-10-CM

## 2021-03-16 DIAGNOSIS — I25.10 CORONARY ARTERY DISEASE INVOLVING NATIVE CORONARY ARTERY OF NATIVE HEART, ANGINA PRESENCE UNSPECIFIED: ICD-10-CM

## 2021-03-16 DIAGNOSIS — M81.0 OSTEOPOROSIS, UNSPECIFIED OSTEOPOROSIS TYPE, UNSPECIFIED PATHOLOGICAL FRACTURE PRESENCE: ICD-10-CM

## 2021-03-16 DIAGNOSIS — F32.4 MAJOR DEPRESSIVE DISORDER WITH SINGLE EPISODE, IN PARTIAL REMISSION (HCC): ICD-10-CM

## 2021-03-16 DIAGNOSIS — N32.81 OVERACTIVE BLADDER: ICD-10-CM

## 2021-03-16 DIAGNOSIS — Z72.0 TOBACCO ABUSE: ICD-10-CM

## 2021-03-16 DIAGNOSIS — J43.8 OTHER EMPHYSEMA (HCC): ICD-10-CM

## 2021-03-16 PROCEDURE — 1090F PRES/ABSN URINE INCON ASSESS: CPT | Performed by: NURSE PRACTITIONER

## 2021-03-16 PROCEDURE — 1123F ACP DISCUSS/DSCN MKR DOCD: CPT | Performed by: NURSE PRACTITIONER

## 2021-03-16 PROCEDURE — 4004F PT TOBACCO SCREEN RCVD TLK: CPT | Performed by: NURSE PRACTITIONER

## 2021-03-16 PROCEDURE — G0439 PPPS, SUBSEQ VISIT: HCPCS | Performed by: NURSE PRACTITIONER

## 2021-03-16 PROCEDURE — 4040F PNEUMOC VAC/ADMIN/RCVD: CPT | Performed by: NURSE PRACTITIONER

## 2021-03-16 PROCEDURE — G8419 CALC BMI OUT NRM PARAM NOF/U: HCPCS | Performed by: NURSE PRACTITIONER

## 2021-03-16 PROCEDURE — 3023F SPIROM DOC REV: CPT | Performed by: NURSE PRACTITIONER

## 2021-03-16 PROCEDURE — 3288F FALL RISK ASSESSMENT DOCD: CPT | Performed by: NURSE PRACTITIONER

## 2021-03-16 PROCEDURE — G8484 FLU IMMUNIZE NO ADMIN: HCPCS | Performed by: NURSE PRACTITIONER

## 2021-03-16 PROCEDURE — G8926 SPIRO NO PERF OR DOC: HCPCS | Performed by: NURSE PRACTITIONER

## 2021-03-16 PROCEDURE — 0518F FALL PLAN OF CARE DOCD: CPT | Performed by: NURSE PRACTITIONER

## 2021-03-16 PROCEDURE — 99214 OFFICE O/P EST MOD 30 MIN: CPT | Performed by: NURSE PRACTITIONER

## 2021-03-16 PROCEDURE — G8427 DOCREV CUR MEDS BY ELIG CLIN: HCPCS | Performed by: NURSE PRACTITIONER

## 2021-03-16 RX ORDER — NITROGLYCERIN 0.4 MG/1
TABLET SUBLINGUAL
Qty: 25 TABLET | Refills: 3 | Status: SHIPPED | OUTPATIENT
Start: 2021-03-16

## 2021-03-16 ASSESSMENT — ENCOUNTER SYMPTOMS
DIARRHEA: 0
SHORTNESS OF BREATH: 0
CHEST TIGHTNESS: 0
SORE THROAT: 0
NAUSEA: 0
VOMITING: 0

## 2021-03-16 ASSESSMENT — PATIENT HEALTH QUESTIONNAIRE - PHQ9
SUM OF ALL RESPONSES TO PHQ QUESTIONS 1-9: 0
1. LITTLE INTEREST OR PLEASURE IN DOING THINGS: 0
SUM OF ALL RESPONSES TO PHQ QUESTIONS 1-9: 0

## 2021-03-16 ASSESSMENT — LIFESTYLE VARIABLES: HOW OFTEN DO YOU HAVE A DRINK CONTAINING ALCOHOL: 0

## 2021-03-16 NOTE — PATIENT INSTRUCTIONS
Personalized Preventive Plan for Teresa Salazar  3/16/2021  Medicare offers a range of preventive health benefits. Some of the tests and screenings are paid in full while other may be subject to a deductible, co-insurance, and/or copay. Some of these benefits include a comprehensive review of your medical history including lifestyle, illnesses that may run in your family, and various assessments and screenings as appropriate. After reviewing your medical record and screening and assessments performed today your provider may have ordered immunizations, labs, imaging, and/or referrals for you. A list of these orders (if applicable) as well as your Preventive Care list are included within your After Visit Summary for your review. Other Preventive Recommendations:    · A preventive eye exam performed by an eye specialist is recommended every 1-2 years to screen for glaucoma; cataracts, macular degeneration, and other eye disorders. · A preventive dental visit is recommended every 6 months. · Try to get at least 150 minutes of exercise per week or 10,000 steps per day on a pedometer . · Order or download the FREE \"Exercise & Physical Activity: Your Everyday Guide\" from [employer]. Call 0-890.131.3995 or search [employer] online. · You need 0903-8556 mg of calcium and 5682-8948 IU of vitamin D per day. It is possible to meet your calcium requirement with diet alone, but a vitamin D supplement is usually necessary to meet this goal.  · When exposed to the sun, use a sunscreen that protects against both UVA and UVB radiation with an SPF of 30 or greater. Reapply every 2 to 3 hours or after sweating, drying off with a towel, or swimming. · Always wear a seat belt when traveling in a car. Always wear a helmet when riding a bicycle or motorcycle. Personalized Preventive Plan for Teresa Salazar  3/16/2021  Medicare offers a range of preventive health benefits.  Some of the tests and screenings are paid in full while other may be subject to a deductible, co-insurance, and/or copay. Some of these benefits include a comprehensive review of your medical history including lifestyle, illnesses that may run in your family, and various assessments and screenings as appropriate. After reviewing your medical record and screening and assessments performed today your provider may have ordered immunizations, labs, imaging, and/or referrals for you. A list of these orders (if applicable) as well as your Preventive Care list are included within your After Visit Summary for your review. Other Preventive Recommendations:    A preventive eye exam performed by an eye specialist is recommended every 1-2 years to screen for glaucoma; cataracts, macular degeneration, and other eye disorders. A preventive dental visit is recommended every 6 months. Try to get at least 150 minutes of exercise per week or 10,000 steps per day on a pedometer . Order or download the FREE \"Exercise & Physical Activity: Your Everyday Guide\" from [employer]. Call 5-636.672.5148 or search [employer] online. You need 9472-4967 mg of calcium and 0545-9278 IU of vitamin D per day. It is possible to meet your calcium requirement with diet alone, but a vitamin D supplement is usually necessary to meet this goal.  When exposed to the sun, use a sunscreen that protects against both UVA and UVB radiation with an SPF of 30 or greater. Reapply every 2 to 3 hours or after sweating, drying off with a towel, or swimming. Always wear a seat belt when traveling in a car. Always wear a helmet when riding a bicycle or motorcycle.

## 2021-03-16 NOTE — PROGRESS NOTES
Medicare Annual Wellness Visit  Name: Jeanmarie Paez Date: 3/16/2021   MRN: [id number] Sex: Female   Age: 80 y.o. Ethnicity: Non-/Non    : 3/16/1927 Race: Abner Knapp is here for Medicare AWV, Diabetes (3 month appt), and COPD (3 month appt)    Screenings for behavioral, psychosocial and functional/safety risks, and cognitive dysfunction are all negative except as indicated below. These results, as well as other patient data from the [address] form, are documented in Flowsheets linked to this Encounter. Allergies   Allergen Reactions    Acyclovir And Related Diarrhea and Nausea Only    Albuterol     Amitriptyline      Dizziness      Asa [Aspirin]      Stomach upset      Betadine [Povidone Iodine]     Biaxin [Clarithromycin] Nausea Only    Buspar [Buspirone]     Cefuroxime Axetil      Diarrhea      Celestone [Betamethasone]     Codeine     Darvocet A500 [Propoxyphene N-Acetaminophen]     Dicyclomine Hcl     Doxycycline Nausea Only    Esomeprazole Magnesium     Fluconazole     Lidex [Fluocinolone]     Neomycin     Pcn [Penicillins] Hives    Protonix [Pantoprazole]     Quinolones Hives    Statins [Statins]      Leg cramps      Tape [Adhesive Tape]     Tramadol      \"makes her walk into walls\"    Valium     Vicodin [Hydrocodone-Acetaminophen]     Zithromax [Azithromycin]      Nausea      Hydrocodone Nausea Only         Prior to Visit Medications    Medication Sig Taking? Authorizing Provider   Handicap Placard MISC by Does not apply route . Expires 2024 Yes OPHELIA Merrill CNP   nitroGLYCERIN (NITROSTAT) 0.4 MG SL tablet Take one tablet under the tongue as needed for chest pain. May repeat every 5 minutes for up to 3 doses.  If no improvement, go to ER Yes OPHELIA Merrill CNP   LORazepam (ATIVAN) 1 MG tablet TAKE ONE TABLET BY MOUTH EVERY 6 HOURS AS NEEDED FOR ANXIETY FOR UP TO 30 DAYS Yes OPHELIA Merrill CNP mirtazapine (REMERON) 15 MG tablet Take 0.5 tablets by mouth nightly Yes OPHELIA Montemayor CNP   albuterol sulfate HFA (PROVENTIL HFA) 108 (90 Base) MCG/ACT inhaler Inhale 2 puffs into the lungs every 6 hours as needed for Wheezing or Shortness of Breath Yes Perez Song,    Fiber POWD Take by mouth nightly Yes Historical Provider, MD   Multiple Vitamins-Minerals (I-DANIA) TABS Take 1 tablet by mouth 2 times daily Yes Historical Provider, MD   Propylene Glycol (SYSTANE BALANCE OP) Apply 1 drop to eye 3 times daily  Yes Historical Provider, MD   acetaminophen (TYLENOL) 500 MG tablet Take 500 mg by mouth every 6 hours as needed for Pain. Yes Historical Provider, MD   Probiotic Product (PROBIOTIC DAILY PO) Take 1 tablet by mouth daily TruBiotics Yes Historical Provider, MD   Multiple Vitamins-Minerals (MULTIVITAMIN & MINERAL PO) Take 1 tablet by mouth daily. Yes Historical Provider, MD   Cholecalciferol (VITAMIN D3) 1000 UNITS TABS Take 1 tablet by mouth daily. Yes Historical Provider, MD   docusate sodium (COLACE) 100 MG capsule Take 100 mg by mouth 2 times daily  Yes Historical Provider, MD   ascorbic acid (VITAMIN C) 500 MG tablet Take 1,000 mg by mouth daily. Yes Historical Provider, MD   Biotin 5000 MCG CAPS Take 2 tablets by mouth daily  Yes Historical Provider, MD   Misc.  Devices MISC Nebulizer machine Dx [id number]  Patient not taking: Reported on 3/16/2021  Karuna Hwang MD   albuterol (PROVENTIL) (2.5 MG/3ML) 0.083% nebulizer solution Take 3 mLs by nebulization every 6 hours as needed for Wheezing  Patient not taking: Reported on 3/16/2021  OPHELIA Montemayor CNP   ferrous gluconate (FERGON) 324 (38 Fe) MG tablet Take 324 mg by mouth daily (with breakfast)  Historical Provider, MD         Past Medical History:   Diagnosis Date    Anxiety     Blepharochalasis     Chest pain     Chronic bronchitis ([facility]     Chronic constipation     Chronic obstructive pulmonary disease (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  GERD (gastroesophageal reflux disease)     Hyperlipidemia     Hypothyroidism     Intestinal adhesions     Iron deficiency anemia     Microalbuminuria     Migraines     Non-ST elevation myocardial infarction (NSTEMI) (HCC)     anteroseptal    Osteoarthritis     Osteoporosis     Palpitations     Pseudophakos     Bilateral.    Radiation exposure age 2    nasopharyngeal radiation    Scoliosis     Tobacco abuse        Past Surgical History:   Procedure Laterality Date    ABDOMINAL EXPLORATION SURGERY  1950    with lysis of adhesions    APPENDECTOMY  1937    CATARACT REMOVAL WITH IMPLANT Right 04/25/2000    CATARACT REMOVAL WITH IMPLANT Left 07/18/2000    CHOLECYSTECTOMY, OPEN  1982    COLONOSCOPY  01/17/2011    Good anastomosis from previous diverticular resection, residual diverticulosis.  COLONOSCOPY  09/29/2008    With EGD showing mild gastritis, moderate sigmiod diverticulosis, internal hemorrhoids.  COLONOSCOPY  10/24/2001    With EGD showing mild antral gastritis, mild erythema of the cecum, small internal hemorrhoid. [facility]    [place] treatment to both ears.  OTHER SURGICAL HISTORY Right 06/06/2000    Laser treatment of \"secondary cataract\".  OTHER SURGICAL HISTORY  10/07/2008    capsule endoscopy    PARTIAL HYSTERECTOMY  1955    Left ovary, tube and uterus removed.  SALPINGO-OOPHORECTOMY  1951    Right ovary and tube removed.     SIGMOIDECTOMY      laparoscopic    SMALL INTESTINE SURGERY  12/05/2008    And lysis of adhesions for diverticular disease. Edda Gatica    UPPER GASTROINTESTINAL ENDOSCOPY  01/17/2011    Mild gastritis.  UPPER GASTROINTESTINAL ENDOSCOPY  10/22/2012    Irritation at the end of the esophagus.  UPPER GASTROINTESTINAL ENDOSCOPY  11/15/2004    Normal.         Family History   Problem Relation Age of Onset    Diabetes Mother     Heart Disease Mother     Stroke Mother     Kidney Disease Mother     Other Mother         Bowel problems.  Alcohol Abuse Father     Heart Disease Other     Stroke Other     Diabetes Other     Mult Sclerosis Other     Diabetes Other     High Blood Pressure Other     High Cholesterol Other     Glaucoma Neg Hx     Cataracts Neg Hx        CareTeam (Including outside providers/suppliers regularly involved in providing care):   Patient Care Team:  OPHELIA Bermudez CNP as PCP - General (Family Nurse Practitioner)  OPHELIA Bermudez CNP as PCP - [facility] Empaneled Provider  Rolo Muir MD as Consulting Physician (Otolaryngology)  Sara Garcia MD as Surgeon (Orthopedic Surgery)    Wt Readings from Last 3 Encounters:   03/16/21 90 lb 6.4 oz (41 kg)   12/15/20 93 lb 3.2 oz (42.3 kg)   08/17/20 97 lb (44 kg)     Vitals:    03/16/21 1257   BP: 120/84   Site: Left Upper Arm   Position: Sitting   Cuff Size: Medium Adult   Pulse: 80   Resp: 20   Weight: 90 lb 6.4 oz (41 kg)   Height: 5' (1.524 m)     Body mass index is 17.66 kg/m². Based upon direct observation of the patient, evaluation of cognition reveals recent and remote memory intact. Patient's complete Health Risk Assessment and screening values have been reviewed and are found in Flowsheets. The following problems were reviewed today and where indicated follow up appointments were made and/or referrals ordered.     Positive Risk Factor Screenings with Interventions:     Fall Risk:  2 or more falls in past year?: (!) yes  Fall with injury in past year?: (!) yes  Fall Risk Interventions:    · Home safety tips provided       Substance History:  Social History     Tobacco History     Smoking Status  Current Every Day Smoker Smoking Frequency  1 pack/day for 68 years (76 pk yrs) Smoking Tobacco Type  Cigarettes    Smokeless Tobacco Use  Never Used          Alcohol History     Alcohol Use Status  No          Drug Use     Drug Use Status  No          Sexual Activity     Sexually Active  Not Asked               Alcohol Screening:       A score of 8 or more is associated with harmful or hazardous drinking. A score of 13 or more in women, and 15 or more in men, is likely to indicate alcohol dependence. Substance Abuse Interventions:  · Tobacco abuse:  patient is not ready to work toward tobacco cessation at this time     Health Habits/Nutrition:  Health Habits/Nutrition  Do you exercise for at least 20 minutes 2-3 times per week?: (!) No  Have you lost any weight without trying in the past 3 months?: (!) Yes  Do you eat only one meal per day?: (!) Yes  Have you seen the dentist within the past year?: (!) No  Body mass index: (!) 17.65  Health Habits/Nutrition Interventions:  · Inadequate physical activity:  patient agrees to increase physical activity as follows: as tolerated  · Nutritional issues:  patient will continue appetite stimulant.  Encouraged to drink a nutritional supplement (Ensure)  · Dental exam overdue:  patient has dentures    Hearing/Vision:  No exam data present  Hearing/Vision  Do you or your family notice any trouble with your hearing that hasn't been managed with hearing aids?: (!) Yes  Do you have difficulty driving, watching TV, or doing any of your daily activities because of your eyesight?: (!) Yes  Hearing/Vision Interventions:  · Hearing concerns:  patient declines any further evaluation/treatment for hearing issues  · Vision concerns:  patient declines any further evaluation/treatment for this issue     ADL: ADLs  In the past 7 days, did you need help from others to perform any of the following everyday activities? Eating, dressing, grooming, bathing, toileting, or walking/balance?: (!) Eating, Dressing, Grooming, Bathing, Walking/Balance  In the past 7 days, did you need help from others to take care of any of the following? Laundry, housekeeping, banking/finances, shopping, telephone use, food preparation, transportation, or taking medications?: [employer], Housekeeping, Banking/Finances, Shopping, Food Preparation, Transportation, Taking Medications  ADL Interventions:  · Family assists patient with her daily needs. Personalized Preventive Plan   Current Health Maintenance Status  Immunization History   Administered Date(s) Administered    COVID-19, Moderna, PF, 100mcg/0.5mL 01/20/2021, 02/17/2021    DTaP 03/23/1998    Influenza Virus Vaccine 10/12/2004, 10/13/2005, 10/19/2006    Pneumococcal Conjugate 13-valent (Alver Phenes) 11/12/2009    Pneumococcal Polysaccharide ([id number]) 06/21/2002        Health Maintenance   Topic Date Due    Shingles Vaccine (1 of 2) Never done   [employer] Visit (AWV)  Never done    Flu vaccine (1) 12/15/2021 (Originally 9/1/2020)    DTaP/Tdap/Td vaccine (2 - Tdap) 01/04/2037 (Originally 3/23/2008)    TSH testing  12/15/2021    Pneumococcal 65+ years Vaccine  Completed    COVID-19 Vaccine  Completed    Hepatitis A vaccine  Aged Out    Hib vaccine  Aged Out    Meningococcal (ACWY) vaccine  Aged Out     Recommendations for [employer] Due: see orders and patient instructions/AVS.  . Recommended screening schedule for the next 5-10 years is provided to the patient in written form: see Patient Instructions/AVS.    Daniel SCHULTZ LPN, 0/96/7907, performed the documented evaluation under the direct supervision of the attending physician.           [facility]  [address] [place] [address]., [place], [address] Kaia Rossi  [phone]      Ailyn Ocean c/o of Medicare AWV, Diabetes (3 month appt), and COPD (3 month appt)      HPI:     HPI  Patient also presents for evaluation and management of chronic medical conditions as noted below. Diabetes stable, A1c 5.7. Currently underweight, did discuss dietary supplements such as Ensure as noted above and Medicare annual wellness check. Not currently on medication for hyperlipidemia due to age. Coronary artery disease stable, denies chest pain or dyspnea. She is not currently following with cardiology. She is requesting a refill on her nitroglycerin, but reports she has not use this at all recently. Hypothyroidism remained stable without medication, most recent TSH within normal limits. COPD is stable with PRN albuterol. She did not have any noticeable improvement when switching from inhaler to nebulizer. She denies shortness of breath or wheezing, though her daughter does note that the home health nurse has noted some wheezing on occasion. Anemia has been stable, most recent hemoglobin 10.8. Unfortunately she has been unable to tolerate iron supplements due to considerable constipation. We did discuss increasing her dietary iron via red meat, etc., patient is agreeable. Depression and anxiety are well controlled on sertraline and Ativan. As previously noted patient has been stable on Ativan for nearly 3 decades and previously reviewed risks and benefits of continuing long-term benzodiazepines in elderly with both patient and daughter. Arthritis stable on as needed Tylenol. Continues on supplements for osteoporosis. Most recent CMP did show slightly elevated calcium at 10.7. Discussed slight decrease of calcium supplement. Chronic constipation has been stable, denies hematochezia. Continues to have problems with chronic sinusitis, daughter states she has previously tried Claritin and Zyrtec. We did discuss other treatment options, patient is not interested at this time.     GERD stable without medication    Discussed tobacco use, patient declines smoking cessation      Current Outpatient Medications   Medication Sig Dispense Refill    Handicap Placard [facility] by Does not apply route . Expires 03/16/2024 1 each 0    nitroGLYCERIN (NITROSTAT) 0.4 MG SL tablet Take one tablet under the tongue as needed for chest pain. May repeat every 5 minutes for up to 3 doses. If no improvement, go to ER 25 tablet 3    LORazepam (ATIVAN) 1 MG tablet TAKE ONE TABLET BY MOUTH EVERY 6 HOURS AS NEEDED FOR ANXIETY FOR UP TO 30 DAYS 120 tablet 0    mirtazapine (REMERON) 15 MG tablet Take 0.5 tablets by mouth nightly 30 tablet 3    albuterol sulfate HFA (PROVENTIL HFA) 108 (90 Base) MCG/ACT inhaler Inhale 2 puffs into the lungs every 6 hours as needed for Wheezing or Shortness of Breath 1 Inhaler 3    Fiber POWD Take by mouth nightly      Multiple Vitamins-Minerals (I-DANIA) TABS Take 1 tablet by mouth 2 times daily      Propylene Glycol (SYSTANE BALANCE OP) Apply 1 drop to eye 3 times daily       acetaminophen (TYLENOL) 500 MG tablet Take 500 mg by mouth every 6 hours as needed for Pain.  Probiotic Product (PROBIOTIC DAILY PO) Take 1 tablet by mouth daily TruBiotics      Multiple Vitamins-Minerals (MULTIVITAMIN & MINERAL PO) Take 1 tablet by mouth daily.  Cholecalciferol (VITAMIN D3) 1000 UNITS TABS Take 1 tablet by mouth daily.  docusate sodium (COLACE) 100 MG capsule Take 100 mg by mouth 2 times daily       ascorbic acid (VITAMIN C) 500 MG tablet Take 1,000 mg by mouth daily.  Biotin 5000 MCG CAPS Take 2 tablets by mouth daily       Misc.  Devices MISC Nebulizer machine Dx [id number] (Patient not taking: Reported on 3/16/2021) 1 Device 0    albuterol (PROVENTIL) (2.5 MG/3ML) 0.083% nebulizer solution Take 3 mLs by nebulization every 6 hours as needed for Wheezing (Patient not taking: Reported on 3/16/2021) 120 each 3    ferrous gluconate (FERGON) 324 (38 Fe) MG tablet Take 324 mg by mouth daily (with breakfast)       No current facility-administered medications for this visit. Allergies   Allergen Reactions    Acyclovir And Related Diarrhea and Nausea Only    Albuterol     Amitriptyline      Dizziness      Asa [Aspirin]      Stomach upset      Betadine [Povidone Iodine]     Biaxin [Clarithromycin] Nausea Only    Buspar [Buspirone]     Cefuroxime Axetil      Diarrhea      Celestone [Betamethasone]     Codeine     Darvocet A500 [Propoxyphene N-Acetaminophen]     Dicyclomine Hcl     Doxycycline Nausea Only    Esomeprazole Magnesium     Fluconazole     Lidex [Fluocinolone]     Neomycin     Pcn [Penicillins] Hives    Protonix [Pantoprazole]     Quinolones Hives    Statins [Statins]      Leg cramps      Tape [Adhesive Tape]     Tramadol      \"makes her walk into walls\"    Valium     Vicodin [Hydrocodone-Acetaminophen]     Zithromax [Azithromycin]      Nausea      Hydrocodone Nausea Only       Health Maintenance   Topic Date Due    Shingles Vaccine (1 of 2) Never done   [employer] Visit (AWV)  Never done    Flu vaccine (1) 12/15/2021 (Originally 9/1/2020)    DTaP/Tdap/Td vaccine (2 - Tdap) 01/04/2037 (Originally 3/23/2008)    TSH testing  12/15/2021    Pneumococcal 65+ years Vaccine  Completed    COVID-19 Vaccine  Completed    Hepatitis A vaccine  Aged Out    Hib vaccine  Aged Out    Meningococcal (ACWY) vaccine  Aged Out       Subjective:      Review of Systems   Constitutional: Negative for chills and fever. HENT: Negative for congestion and sore throat. Respiratory: Negative for chest tightness and shortness of breath. Cardiovascular: Negative for chest pain and leg swelling. Gastrointestinal: Negative for diarrhea, nausea and vomiting. Genitourinary: Negative for difficulty urinating and dysuria. Musculoskeletal: Negative for gait problem and myalgias. Neurological: Negative for dizziness and headaches.    Psychiatric/Behavioral: Negative for confusion and decreased concentration. Objective:     Vitals:    03/16/21 1257   BP: 120/84   Site: Left Upper Arm   Position: Sitting   Cuff Size: Medium Adult   Pulse: 80   Resp: 20   Weight: 90 lb 6.4 oz (41 kg)   Height: 5' (1.524 m)     Physical Exam  Vitals signs and nursing note reviewed. Constitutional:       General: She is not in acute distress. Appearance: She is well-developed. HENT:      Head: Normocephalic and atraumatic. Right Ear: External ear normal.      Left Ear: External ear normal.   Eyes:      General: Lids are normal.      Extraocular Movements: Extraocular movements intact. Conjunctiva/sclera: Conjunctivae normal.   Neck:      Musculoskeletal: Neck supple. Thyroid: No thyromegaly. Cardiovascular:      Rate and Rhythm: Normal rate and regular rhythm. Heart sounds: No murmur. Pulmonary:      Effort: Pulmonary effort is normal. No accessory muscle usage or respiratory distress. Breath sounds: Normal breath sounds. No wheezing, rhonchi or rales. Abdominal:      Palpations: Abdomen is soft. Tenderness: There is no abdominal tenderness. There is no guarding or rebound. Musculoskeletal: Normal range of motion. Right lower leg: No edema. Left lower leg: No edema. Lymphadenopathy:      Cervical: No cervical adenopathy. Skin:     General: Skin is warm and dry. Nails: There is no clubbing. Neurological:      Mental Status: She is alert. Coordination: Coordination normal.   Psychiatric:         Mood and Affect: Mood normal.         Speech: Speech normal.         Behavior: Behavior normal.         Assessment/Plan:        1. Routine general medical examination at a health care facility  - As noted above    2. Controlled type 2 diabetes mellitus without complication, without long-term current use of insulin ([address]  stable  - Continue to monitor  - Basic Metabolic Panel; Future    3.  Mixed hyperlipidemia, stable  - Not on statin due to age, continue to monitor    4. Coronary artery disease involving native coronary artery of native heart, angina presence unspecified, stable  - Asymptomatic, not currently following with cardiology    5. Other specified hypothyroidism,  stable  - Continue to monitor    6. Other emphysema ([facility]  stable  - Continue to monitor    7. Iron deficiency anemia secondary to inadequate dietary iron intake, stable  - Recheck CBC. Unable to tolerate iron supplement recently  - CBC Auto Differential; Future    8. Primary osteoarthritis involving multiple joints,  stable  - Continue to monitor    9. Osteoporosis, unspecified osteoporosis type, unspecified pathological fracture presence, stable  - Will decrease calcium supplement by half due to hypercalcemia    10. Chronic constipation,  stable  - Continue to monitor    11. Chronic maxillary sinusitis,  stable  - Continue to monitor    12. Major depressive disorder with single episode, in partial remission ([facility] stable  13. Anxiety, stable  - Continue sertraline and ativan as noted above. No signs of drug abuse or diversion    14. Gastroesophageal reflux disease without esophagitis,  stable  - Continue to monitor    15. Overactive bladder,  stable  - Continue to monitor    16. Tobacco abuse, stable  - Patient declines smoking cessation        Return in 4 months (on 7/16/2021) for Medicare Annual Wellness Visit in 1 year. Orders Placed This Encounter   Procedures    CBC Auto Differential     Standing Status:   Future     Standing Expiration Date:   3/16/2022    Basic Metabolic Panel     Standing Status:   Future     Standing Expiration Date:   3/16/2022     Orders Placed This Encounter   Medications    Handicap Placard MISC     Sig: by Does not apply route . Expires 03/16/2024     Dispense:  1 each     Refill:  0    nitroGLYCERIN (NITROSTAT) 0.4 MG SL tablet     Sig: Take one tablet under the tongue as needed for chest pain.  May repeat every 5 minutes for up to 3 doses. If no improvement, go to ER     Dispense:  25 tablet     Refill:  3       All patient questions answered. Pt voiced understanding.              Electronically signed by EUFEMIA Cox on 3/16/2021 at 3:42 PM

## 2021-03-17 ENCOUNTER — HOSPITAL ENCOUNTER (OUTPATIENT)
Age: 86
Setting detail: SPECIMEN
Discharge: HOME | End: 2021-03-17
Payer: MEDICARE

## 2021-03-17 DIAGNOSIS — D50.8 IRON DEFICIENCY ANEMIA SECONDARY TO INADEQUATE DIETARY IRON INTAKE: ICD-10-CM

## 2021-03-17 DIAGNOSIS — E11.9 CONTROLLED TYPE 2 DIABETES MELLITUS WITHOUT COMPLICATION, WITHOUT LONG-TERM CURRENT USE OF INSULIN (HCC): ICD-10-CM

## 2021-03-17 LAB
ABSOLUTE EOS #: 0.25 K/UL (ref 0–0.44)
ABSOLUTE IMMATURE GRANULOCYTE: <0.03 K/UL (ref 0–0.3)
ABSOLUTE LYMPH #: 1.56 K/UL (ref 1.1–3.7)
ABSOLUTE MONO #: 0.72 K/UL (ref 0.1–1.2)
ANION GAP SERPL CALCULATED.3IONS-SCNC: 7 MMOL/L (ref 9–17)
BASOPHILS # BLD: 1 % (ref 0–2)
BASOPHILS ABSOLUTE: 0.09 K/UL (ref 0–0.2)
BUN BLDV-MCNC: 19 MG/DL (ref 8–23)
BUN/CREAT BLD: 30 (ref 9–20)
CALCIUM SERPL-MCNC: 9.7 MG/DL (ref 8.6–10.4)
CHLORIDE BLD-SCNC: 99 MMOL/L (ref 98–107)
CO2: 31 MMOL/L (ref 20–31)
CREAT SERPL-MCNC: 0.63 MG/DL (ref 0.5–0.9)
DIFFERENTIAL TYPE: ABNORMAL
EOSINOPHILS RELATIVE PERCENT: 4 % (ref 1–4)
GFR AFRICAN AMERICAN: >60 ML/MIN
GFR NON-AFRICAN AMERICAN: >60 ML/MIN
GFR SERPL CREATININE-BSD FRML MDRD: ABNORMAL ML/MIN/{1.73_M2}
GFR SERPL CREATININE-BSD FRML MDRD: ABNORMAL ML/MIN/{1.73_M2}
GLUCOSE BLD-MCNC: 93 MG/DL (ref 70–99)
HCT VFR BLD CALC: 32.9 % (ref 36.3–47.1)
HEMOGLOBIN: 10 G/DL (ref 11.9–15.1)
IMMATURE GRANULOCYTES: 0 %
LYMPHOCYTES # BLD: 22 % (ref 24–43)
MCH RBC QN AUTO: 24.2 PG (ref 25.2–33.5)
MCHC RBC AUTO-ENTMCNC: 30.4 G/DL (ref 25.2–33.5)
MCV RBC AUTO: 79.7 FL (ref 82.6–102.9)
MONOCYTES # BLD: 10 % (ref 3–12)
NRBC AUTOMATED: 0 PER 100 WBC
PDW BLD-RTO: 17.2 % (ref 11.8–14.4)
PLATELET # BLD: 335 K/UL (ref 138–453)
PLATELET ESTIMATE: ABNORMAL
PMV BLD AUTO: 10.2 FL (ref 8.1–13.5)
POTASSIUM SERPL-SCNC: 4.4 MMOL/L (ref 3.7–5.3)
RBC # BLD: 4.13 M/UL (ref 3.95–5.11)
RBC # BLD: ABNORMAL 10*6/UL
SEG NEUTROPHILS: 63 % (ref 36–65)
SEGMENTED NEUTROPHILS ABSOLUTE COUNT: 4.57 K/UL (ref 1.5–8.1)
SODIUM BLD-SCNC: 137 MMOL/L (ref 135–144)
WBC # BLD: 7.2 K/UL (ref 3.5–11.3)
WBC # BLD: ABNORMAL 10*3/UL

## 2021-03-17 PROCEDURE — 80048 BASIC METABOLIC PNL TOTAL CA: CPT

## 2021-03-17 PROCEDURE — 85025 COMPLETE CBC W/AUTO DIFF WBC: CPT

## 2021-03-18 NOTE — RESULT ENCOUNTER NOTE
Left message for patient's daughter to return call. Results faxed to [address] [facility] ([phone]).

## 2021-04-06 ENCOUNTER — TELEPHONE (OUTPATIENT)
Dept: INTERNAL MEDICINE | Age: 86
End: 2021-04-06
Payer: MEDICARE

## 2021-04-06 DIAGNOSIS — S42.201K UNSPECIFIED FRACTURE OF UPPER END OF RIGHT HUMERUS, SUBSEQUENT ENCOUNTER FOR FRACTURE WITH NONUNION: ICD-10-CM

## 2021-04-06 DIAGNOSIS — D50.8 OTHER IRON DEFICIENCY ANEMIAS: ICD-10-CM

## 2021-04-06 DIAGNOSIS — M41.9 SCOLIOSIS, UNSPECIFIED SCOLIOSIS TYPE, UNSPECIFIED SPINAL REGION: Primary | ICD-10-CM

## 2021-04-06 DIAGNOSIS — M47.816 SPONDYLOSIS OF LUMBAR REGION WITHOUT MYELOPATHY OR RADICULOPATHY: ICD-10-CM

## 2021-04-06 PROCEDURE — G0179 MD RECERTIFICATION HHA PT: HCPCS | Performed by: INTERNAL MEDICINE

## 2021-04-14 ENCOUNTER — TELEPHONE (OUTPATIENT)
Dept: INTERNAL MEDICINE | Age: 86
End: 2021-04-14

## 2021-04-14 ENCOUNTER — HOSPITAL ENCOUNTER (OUTPATIENT)
Age: 86
Setting detail: SPECIMEN
Discharge: HOME | End: 2021-04-14
Payer: MEDICARE

## 2021-04-14 DIAGNOSIS — F41.9 ANXIETY: ICD-10-CM

## 2021-04-14 DIAGNOSIS — R30.0 DYSURIA: Primary | ICD-10-CM

## 2021-04-14 DIAGNOSIS — R30.0 DYSURIA: ICD-10-CM

## 2021-04-14 LAB
-: ABNORMAL
AMORPHOUS: ABNORMAL
BACTERIA: ABNORMAL
BILIRUBIN URINE: NEGATIVE
CASTS UA: ABNORMAL /LPF (ref 0–2)
COLOR: ABNORMAL
COMMENT UA: ABNORMAL
CRYSTALS, UA: ABNORMAL /HPF
EPITHELIAL CELLS UA: ABNORMAL /HPF (ref 0–5)
GLUCOSE URINE: NEGATIVE
KETONES, URINE: ABNORMAL
LEUKOCYTE ESTERASE, URINE: NEGATIVE
MUCUS: ABNORMAL
NITRITE, URINE: NEGATIVE
OTHER OBSERVATIONS UA: ABNORMAL
PH UA: 6.5 (ref 5–6)
PROTEIN UA: ABNORMAL
RBC UA: ABNORMAL /HPF (ref 0–4)
RENAL EPITHELIAL, UA: ABNORMAL /HPF
SPECIFIC GRAVITY UA: 1.02 (ref 1.01–1.02)
TRICHOMONAS: ABNORMAL
TURBIDITY: ABNORMAL
URINE HGB: NEGATIVE
UROBILINOGEN, URINE: NORMAL
WBC UA: ABNORMAL /HPF (ref 0–4)
YEAST: ABNORMAL

## 2021-04-14 PROCEDURE — 81001 URINALYSIS AUTO W/SCOPE: CPT

## 2021-04-14 RX ORDER — LORAZEPAM 1 MG/1
TABLET ORAL
Qty: 120 TABLET | Refills: 0 | Status: SHIPPED | OUTPATIENT
Start: 2021-04-14 | End: 2021-01-01 | Stop reason: SDUPTHER

## 2021-04-14 NOTE — TELEPHONE ENCOUNTER
Received call from daughter (Kelli Carcamo)- pt is c/o dysuria, and wants to have UA. Denies Hematuria or fever. UA ordered- daughter will  sterile specimen container. She obtain UA at home, and return sample. Allergy to PCN (hives), Doxycycline (nausea).

## 2021-04-15 ENCOUNTER — TELEPHONE (OUTPATIENT)
Dept: INTERNAL MEDICINE | Age: 86
End: 2021-04-15

## 2021-04-15 RX ORDER — NITROFURANTOIN 25; 75 MG/1; MG/1
100 CAPSULE ORAL 2 TIMES DAILY
Qty: 10 CAPSULE | Refills: 0 | Status: SHIPPED | OUTPATIENT
Start: 2021-04-15 | End: 2021-04-20

## 2021-04-15 NOTE — TELEPHONE ENCOUNTER
Drena Glaze, APRN - CNP  P [facility] Internal [facility] Clinical Staff             Macrobid sent to [address] [place] [address]

## 2021-04-22 ENCOUNTER — HOSPITAL ENCOUNTER (OUTPATIENT)
Dept: LAB | Age: 86
Discharge: HOME | End: 2021-04-22
Payer: MEDICARE

## 2021-04-22 ENCOUNTER — OFFICE VISIT (OUTPATIENT)
Dept: INTERNAL MEDICINE | Age: 86
End: 2021-04-22
Payer: MEDICARE

## 2021-04-22 ENCOUNTER — HOSPITAL ENCOUNTER (OUTPATIENT)
Dept: GENERAL RADIOLOGY | Age: 86
Discharge: HOME | End: 2021-04-24
Payer: MEDICARE

## 2021-04-22 VITALS
DIASTOLIC BLOOD PRESSURE: 60 MMHG | OXYGEN SATURATION: 94 % | SYSTOLIC BLOOD PRESSURE: 118 MMHG | HEART RATE: 84 BPM | HEIGHT: 60 IN | BODY MASS INDEX: 18.34 KG/M2 | TEMPERATURE: 98.4 F | WEIGHT: 93.4 LBS

## 2021-04-22 DIAGNOSIS — R30.0 DYSURIA: ICD-10-CM

## 2021-04-22 DIAGNOSIS — M79.89 BILATERAL SWELLING OF FEET: ICD-10-CM

## 2021-04-22 DIAGNOSIS — R10.30 LOWER ABDOMINAL PAIN: Primary | ICD-10-CM

## 2021-04-22 DIAGNOSIS — N94.9 VAGINAL DISCOMFORT: ICD-10-CM

## 2021-04-22 DIAGNOSIS — N32.81 OVERACTIVE BLADDER: ICD-10-CM

## 2021-04-22 DIAGNOSIS — R06.09 DOE (DYSPNEA ON EXERTION): ICD-10-CM

## 2021-04-22 DIAGNOSIS — K59.00 CONSTIPATION, UNSPECIFIED CONSTIPATION TYPE: ICD-10-CM

## 2021-04-22 DIAGNOSIS — R06.09 DOE (DYSPNEA ON EXERTION): Primary | ICD-10-CM

## 2021-04-22 DIAGNOSIS — R10.30 LOWER ABDOMINAL PAIN: ICD-10-CM

## 2021-04-22 LAB
-: ABNORMAL
ABSOLUTE EOS #: 0.8 K/UL (ref 0–0.44)
ABSOLUTE IMMATURE GRANULOCYTE: 0.04 K/UL (ref 0–0.3)
ABSOLUTE LYMPH #: 0.96 K/UL (ref 1.1–3.7)
ABSOLUTE MONO #: 0.86 K/UL (ref 0.1–1.2)
ALBUMIN SERPL-MCNC: 4.1 G/DL (ref 3.5–5.2)
ALBUMIN/GLOBULIN RATIO: 1.3 (ref 1–2.5)
ALP BLD-CCNC: 101 U/L (ref 35–104)
ALT SERPL-CCNC: 12 U/L (ref 5–33)
AMORPHOUS: ABNORMAL
ANION GAP SERPL CALCULATED.3IONS-SCNC: 6 MMOL/L (ref 9–17)
AST SERPL-CCNC: 26 U/L
BACTERIA: ABNORMAL
BASOPHILS # BLD: 1 % (ref 0–2)
BASOPHILS ABSOLUTE: 0.06 K/UL (ref 0–0.2)
BILIRUB SERPL-MCNC: 0.27 MG/DL (ref 0.3–1.2)
BILIRUBIN URINE: NEGATIVE
BNP INTERPRETATION: ABNORMAL
BUN BLDV-MCNC: 20 MG/DL (ref 8–23)
BUN/CREAT BLD: 30 (ref 9–20)
CALCIUM SERPL-MCNC: 10 MG/DL (ref 8.6–10.4)
CASTS UA: ABNORMAL /LPF (ref 0–2)
CHLORIDE BLD-SCNC: 98 MMOL/L (ref 98–107)
CO2: 31 MMOL/L (ref 20–31)
COLOR: ABNORMAL
COMMENT UA: ABNORMAL
CREAT SERPL-MCNC: 0.66 MG/DL (ref 0.5–0.9)
CRYSTALS, UA: ABNORMAL /HPF
DIFFERENTIAL TYPE: ABNORMAL
EOSINOPHILS RELATIVE PERCENT: 8 % (ref 1–4)
EPITHELIAL CELLS UA: ABNORMAL /HPF (ref 0–5)
GFR AFRICAN AMERICAN: >60 ML/MIN
GFR NON-AFRICAN AMERICAN: >60 ML/MIN
GFR SERPL CREATININE-BSD FRML MDRD: ABNORMAL ML/MIN/{1.73_M2}
GFR SERPL CREATININE-BSD FRML MDRD: ABNORMAL ML/MIN/{1.73_M2}
GLUCOSE BLD-MCNC: 120 MG/DL (ref 70–99)
GLUCOSE URINE: NEGATIVE
HCT VFR BLD CALC: 32.7 % (ref 36.3–47.1)
HEMOGLOBIN: 9.7 G/DL (ref 11.9–15.1)
IMMATURE GRANULOCYTES: 0 %
KETONES, URINE: ABNORMAL
LEUKOCYTE ESTERASE, URINE: NEGATIVE
LYMPHOCYTES # BLD: 10 % (ref 24–43)
MCH RBC QN AUTO: 24.1 PG (ref 25.2–33.5)
MCHC RBC AUTO-ENTMCNC: 29.7 G/DL (ref 25.2–33.5)
MCV RBC AUTO: 81.3 FL (ref 82.6–102.9)
MONOCYTES # BLD: 9 % (ref 3–12)
MUCUS: ABNORMAL
NITRITE, URINE: NEGATIVE
NRBC AUTOMATED: 0 PER 100 WBC
OTHER OBSERVATIONS UA: ABNORMAL
PDW BLD-RTO: 18.4 % (ref 11.8–14.4)
PH UA: 6.5 (ref 5–6)
PLATELET # BLD: 422 K/UL (ref 138–453)
PLATELET ESTIMATE: ABNORMAL
PMV BLD AUTO: 8.8 FL (ref 8.1–13.5)
POTASSIUM SERPL-SCNC: 4.8 MMOL/L (ref 3.7–5.3)
PRO-BNP: 499 PG/ML
PROTEIN UA: ABNORMAL
RBC # BLD: 4.02 M/UL (ref 3.95–5.11)
RBC # BLD: ABNORMAL 10*6/UL
RBC UA: ABNORMAL /HPF (ref 0–4)
RENAL EPITHELIAL, UA: ABNORMAL /HPF
SEG NEUTROPHILS: 72 % (ref 36–65)
SEGMENTED NEUTROPHILS ABSOLUTE COUNT: 7.03 K/UL (ref 1.5–8.1)
SODIUM BLD-SCNC: 135 MMOL/L (ref 135–144)
SPECIFIC GRAVITY UA: 1.01 (ref 1.01–1.02)
TOTAL PROTEIN: 7.2 G/DL (ref 6.4–8.3)
TRICHOMONAS: ABNORMAL
TURBIDITY: ABNORMAL
URINE HGB: NEGATIVE
UROBILINOGEN, URINE: NORMAL
WBC # BLD: 9.8 K/UL (ref 3.5–11.3)
WBC # BLD: ABNORMAL 10*3/UL
WBC UA: ABNORMAL /HPF (ref 0–4)
YEAST: ABNORMAL

## 2021-04-22 PROCEDURE — 1123F ACP DISCUSS/DSCN MKR DOCD: CPT | Performed by: NURSE PRACTITIONER

## 2021-04-22 PROCEDURE — 99214 OFFICE O/P EST MOD 30 MIN: CPT | Performed by: NURSE PRACTITIONER

## 2021-04-22 PROCEDURE — 1090F PRES/ABSN URINE INCON ASSESS: CPT | Performed by: NURSE PRACTITIONER

## 2021-04-22 PROCEDURE — 81001 URINALYSIS AUTO W/SCOPE: CPT

## 2021-04-22 PROCEDURE — 80053 COMPREHEN METABOLIC PANEL: CPT

## 2021-04-22 PROCEDURE — 83880 ASSAY OF NATRIURETIC PEPTIDE: CPT

## 2021-04-22 PROCEDURE — G8427 DOCREV CUR MEDS BY ELIG CLIN: HCPCS | Performed by: NURSE PRACTITIONER

## 2021-04-22 PROCEDURE — 71046 X-RAY EXAM CHEST 2 VIEWS: CPT

## 2021-04-22 PROCEDURE — G8419 CALC BMI OUT NRM PARAM NOF/U: HCPCS | Performed by: NURSE PRACTITIONER

## 2021-04-22 PROCEDURE — 87086 URINE CULTURE/COLONY COUNT: CPT

## 2021-04-22 PROCEDURE — 4004F PT TOBACCO SCREEN RCVD TLK: CPT | Performed by: NURSE PRACTITIONER

## 2021-04-22 PROCEDURE — 0518F FALL PLAN OF CARE DOCD: CPT | Performed by: NURSE PRACTITIONER

## 2021-04-22 PROCEDURE — 74018 RADEX ABDOMEN 1 VIEW: CPT

## 2021-04-22 PROCEDURE — 4040F PNEUMOC VAC/ADMIN/RCVD: CPT | Performed by: NURSE PRACTITIONER

## 2021-04-22 PROCEDURE — 85025 COMPLETE CBC W/AUTO DIFF WBC: CPT

## 2021-04-22 PROCEDURE — 3288F FALL RISK ASSESSMENT DOCD: CPT | Performed by: NURSE PRACTITIONER

## 2021-04-22 PROCEDURE — 36415 COLL VENOUS BLD VENIPUNCTURE: CPT

## 2021-04-22 ASSESSMENT — ENCOUNTER SYMPTOMS
SINUS PRESSURE: 0
COUGH: 0
RHINORRHEA: 0
NAUSEA: 0
ABDOMINAL PAIN: 0
WHEEZING: 0
SORE THROAT: 0
VOMITING: 0
CONSTIPATION: 0
CHEST TIGHTNESS: 0
DIARRHEA: 0
COLOR CHANGE: 0
TROUBLE SWALLOWING: 0
SHORTNESS OF BREATH: 0
FACIAL SWELLING: 0
EYE PAIN: 0
BLOOD IN STOOL: 0

## 2021-04-22 NOTE — PROGRESS NOTES
04/22/21  Chip Vora  3/16/1927      Chief Complaint:   1. PRICE (dyspnea on exertion)    2. Vaginal discomfort    3. Bilateral swelling of feet    4. Constipation, unspecified constipation type        HPI:  80year-old patient with history of long-term tobacco use, anxiety, chronic constipation, COPD, CAD being seen at the request of her daughter due to increasing swelling to feet, abdominal discomfort. Daughter states that she recently had a UTI and was given Macrobid. She completed 5-day course. Patient denies any difficulty with urination at present. States the abdominal pain is actually vaginal pain. Feels like she is sitting on something. She is concerned that her bladder has prolapse. She denies any problems with emptying her bladder but does go frequently. States if she sits up for a long time it feels like it is going to fall out. She denies any abdominal pain on exam.  Is noted that she has had some issues with constipation. Daughter states she is forgetful. Has bowel movements about once every 3 to 4 days. Currently is on Colace daily. Using fiber daily. Daughter states her stools have been hard. She is also concerned that she has had increased swelling to her feet. Patient states she occasionally gets dyspneic on exertion however daughter states this is not uncommon because she has smoked a pack of cigarettes daily for her whole entire life. Patient has no desire to quit smoking. She denies any chest discomfort. No heart palpitations.       Allergies   Allergen Reactions    Acyclovir And Related Diarrhea and Nausea Only    Albuterol     Amitriptyline      Dizziness      Asa [Aspirin]      Stomach upset      Betadine [Povidone Iodine]     Biaxin [Clarithromycin] Nausea Only    Buspar [Buspirone]     Cefuroxime Axetil      Diarrhea      Celestone [Betamethasone]     Codeine     Darvocet A500 [Propoxyphene N-Acetaminophen]     Dicyclomine Hcl     Doxycycline Nausea Only    Esomeprazole Magnesium     Fluconazole     Lidex [Fluocinolone]     Neomycin     Pcn [Penicillins] Hives    Protonix [Pantoprazole]     Quinolones Hives    Statins [Statins]      Leg cramps      Tape [Adhesive Tape]     Tramadol      \"makes her walk into walls\"    Valium     Vicodin [Hydrocodone-Acetaminophen]     Zithromax [Azithromycin]      Nausea      Hydrocodone Nausea Only       Past Medical History:   Diagnosis Date    Anxiety     Blepharochalasis     Chest pain     Chronic bronchitis (HCC)     Chronic constipation     Chronic obstructive pulmonary disease (HCC)     Closed fracture of proximal end of right humerus with routine healing 2/20/2018    Coronary artery disease     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  GERD (gastroesophageal reflux disease)     Hyperlipidemia     Hypothyroidism     Intestinal adhesions     Iron deficiency anemia     Microalbuminuria     Migraines     Non-ST elevation myocardial infarction (NSTEMI) ([facility])     anteroseptal    Osteoarthritis     Osteoporosis     Palpitations     Pseudophakos     Bilateral.    Radiation exposure age 2    nasopharyngeal radiation    Scoliosis     Tobacco abuse        Past Surgical History:   Procedure Laterality Date    ABDOMINAL EXPLORATION SURGERY  1950    with lysis of adhesions    APPENDECTOMY  1937    CATARACT REMOVAL WITH IMPLANT Right 04/25/2000    CATARACT REMOVAL WITH IMPLANT Left 07/18/2000    CHOLECYSTECTOMY, OPEN  1982    COLONOSCOPY  01/17/2011    Good anastomosis from previous diverticular resection, residual diverticulosis.     COLONOSCOPY  09/29/2008    With EGD showing mild gastritis, moderate sigmiod diverticulosis, internal hemorrhoids.  COLONOSCOPY  10/24/2001    With EGD showing mild antral gastritis, mild erythema of the cecum, small internal hemorrhoid. [facility]    [place] treatment to both ears.  OTHER SURGICAL HISTORY Right 06/06/2000    Laser treatment of \"secondary cataract\".  OTHER SURGICAL HISTORY  10/07/2008    capsule endoscopy    PARTIAL HYSTERECTOMY  1955    Left ovary, tube and uterus removed.  SALPINGO-OOPHORECTOMY  1951    Right ovary and tube removed.  SIGMOIDECTOMY      laparoscopic    SMALL INTESTINE SURGERY  12/05/2008    And lysis of adhesions for diverticular disease. Felipe Chu    UPPER GASTROINTESTINAL ENDOSCOPY  01/17/2011    Mild gastritis.  UPPER GASTROINTESTINAL ENDOSCOPY  10/22/2012    Irritation at the end of the esophagus.  UPPER GASTROINTESTINAL ENDOSCOPY  11/15/2004    Normal.       Current Outpatient Medications on File Prior to Visit   Medication Sig Dispense Refill    LORazepam (ATIVAN) 1 MG tablet TAKE ONE TABLET BY MOUTH EVERY 6 HOURS AS NEEDED FOR ANXIETY FOR UP TO 30 DAYS 120 tablet 0    nitroGLYCERIN (NITROSTAT) 0.4 MG SL tablet Take one tablet under the tongue as needed for chest pain. May repeat every 5 minutes for up to 3 doses. If no improvement, go to ER 25 tablet 3    mirtazapine (REMERON) 15 MG tablet Take 0.5 tablets by mouth nightly 30 tablet 3    Misc.  Devices MISC Nebulizer machine Dx J44.19 1 Device 0    albuterol (PROVENTIL) (2.5 MG/3ML) 0.083% nebulizer solution Take 3 mLs by nebulization every 6 hours as needed for Wheezing 120 each 3    ferrous gluconate (FERGON) 324 (38 Fe) MG tablet Take 324 mg by mouth daily (with breakfast)      albuterol sulfate HFA (PROVENTIL HFA) 108 (90 Base) MCG/ACT inhaler Inhale 2 puffs into the lungs every 6 hours as needed for Wheezing or Shortness of Breath 1 Inhaler 3    Fiber POWD Take by mouth nightly      Multiple Vitamins-Minerals (I-DANIA) TABS Take 1 tablet by mouth 2 times daily      Propylene Glycol (SYSTANE BALANCE OP) Apply 1 drop to eye 3 times daily       acetaminophen (TYLENOL) 500 MG tablet Take 500 mg by mouth every 6 hours as needed for Pain.  Probiotic Product (PROBIOTIC DAILY PO) Take 1 tablet by mouth daily TruBiotics      Multiple Vitamins-Minerals (MULTIVITAMIN & MINERAL PO) Take 1 tablet by mouth daily.  Cholecalciferol (VITAMIN D3) 1000 UNITS TABS Take 1 tablet by mouth daily.  docusate sodium (COLACE) 100 MG capsule Take 100 mg by mouth 2 times daily       ascorbic acid (VITAMIN C) 500 MG tablet Take 1,000 mg by mouth daily.  Biotin 5000 MCG CAPS Take 2 tablets by mouth daily       Handicap Placard MISC by Does not apply route . Expires 03/16/2024 1 each 0     No current facility-administered medications on file prior to visit. Social History     Socioeconomic History    Marital status:       Spouse name: Not on file    Number of children: Not on file    Years of education: Not on file    Highest education level: Not on file   Occupational History    Not on file   Social Needs    Financial resource strain: Not hard at all    Food insecurity     Worry: Never true     Inability: Never true    Transportation needs     Medical: No     Non-medical: No   Tobacco Use    Smoking status: Current Every Day Smoker     Packs/day: 1.00     Years: 68.00     Pack years: 68.00     Types: Cigarettes    Smokeless tobacco: Never Used   Substance and Sexual Activity    Alcohol use: No    Drug use: No    Sexual activity: Not on file   Lifestyle    Physical activity     Days per week: Not on file     Minutes per session: Not on file    Stress: Not on file   Relationships    Social connections     Talks on phone: Not on file     Gets together: Not on file     Attends Jew service: Not on file     Active member of club or organization: Not on file Attends meetings of clubs or organizations: Not on file     Relationship status: Not on file    Intimate partner violence     Fear of current or ex partner: Not on file     Emotionally abused: Not on file     Physically abused: Not on file     Forced sexual activity: Not on file   Other Topics Concern    Not on file   Social History Narrative    Not on file       Review of Systems   Constitutional: Negative for activity change, appetite change, chills, fatigue, fever and unexpected weight change. HENT: Negative for congestion, dental problem, ear discharge, ear pain, facial swelling, hearing loss, postnasal drip, rhinorrhea, sinus pressure, sore throat and trouble swallowing. Eyes: Negative for pain and visual disturbance. Respiratory: Negative for cough, chest tightness, shortness of breath (Positive dyspnea on exertion) and wheezing. Cardiovascular: Positive for leg swelling. Negative for chest pain and palpitations. Gastrointestinal: Negative for abdominal pain, blood in stool, constipation, diarrhea, nausea and vomiting. Endocrine: Negative for cold intolerance, heat intolerance and polyuria. Genitourinary: Positive for frequency. Negative for decreased urine volume, difficulty urinating, dyspareunia, dysuria, enuresis, genital sores, hematuria, vaginal bleeding, vaginal discharge and vaginal pain (Feels that something is coming out). Chronic incontinence   Musculoskeletal: Negative for arthralgias, gait problem, myalgias, neck pain and neck stiffness. Skin: Negative for color change, rash and wound. Neurological: Negative for dizziness, tremors, seizures, weakness, light-headedness, numbness and headaches. Psychiatric/Behavioral: Negative for confusion and hallucinations. The patient is not nervous/anxious. Physical Exam  Vitals signs and nursing note reviewed. Constitutional:       General: She is not in acute distress. Appearance: Normal appearance.  She is well-developed. She is not diaphoretic. HENT:      Head: Normocephalic and atraumatic. Right Ear: External ear normal.      Left Ear: External ear normal.   Eyes:      General:         Right eye: No discharge. Left eye: No discharge. Neck:      Trachea: No tracheal deviation. Cardiovascular:      Rate and Rhythm: Normal rate and regular rhythm. Pulses: Normal pulses. Heart sounds: Normal heart sounds. No murmur. No friction rub. No gallop. Pulmonary:      Effort: Pulmonary effort is normal. No respiratory distress. Breath sounds: Normal breath sounds. No stridor. No wheezing, rhonchi or rales. Comments: Harsh productive cough, prolonged expiratory phase. Chest:      Chest wall: No tenderness. Abdominal:      General: Bowel sounds are normal. There is no distension. Palpations: Abdomen is soft. There is no mass. Tenderness: There is no abdominal tenderness. Hernia: No hernia is present. Musculoskeletal:         General: No swelling. Right lower leg: Edema present. Left lower leg: Edema (+2 pedal edema bilateral) present. Skin:     General: Skin is warm and dry. Capillary Refill: Capillary refill takes less than 2 seconds. Coloration: Skin is not pale. Findings: No rash. Neurological:      General: No focal deficit present. Mental Status: She is alert. Mental status is at baseline. Cranial Nerves: No cranial nerve deficit. Sensory: No sensory deficit. Coordination: Coordination normal.      Gait: Gait abnormal.      Comments: Significantly hard of hearing   Psychiatric:         Mood and Affect: Mood normal.         Behavior: Behavior normal.       Vitals:    04/22/21 1317   BP: 118/60   Site: Left Upper Arm   Position: Sitting   Cuff Size: Large Adult   Pulse: 84   Temp: 98.4 °F (36.9 °C)   TempSrc: Temporal   SpO2: 94%   Weight: 93 lb 6.4 oz (42.4 kg)   Height: 5' (1.524 m)       Assessment:  1.  PRICE (dyspnea on exertion)  - CBC Auto Differential; Future  - Brain Natriuretic Peptide; Future  - Comprehensive Metabolic Panel; Future  - XR CHEST STANDARD (2 VW); Future  Probable multifactorial.  Labs and chest x-ray today     2. Vaginal discomfort  We will get her set up for GYN exam questionable prolapsed vagina  -  - External Referral To Gynecology    3. Bilateral swelling of feet  X-ray labs today. Elevate legs. 2 g sodium diet  - XR CHEST STANDARD (2 VW); Future    4. Constipation, unspecified constipation type  KUB to rule out constipation. Could be causing part of her urinary symptoms  - XR ABDOMEN (KUB) (SINGLE AP VIEW); Future      Plan:  As noted above. Follow up for routine visit. Call sooner with concerns prior.     Electronically signed by OPHELIA Alatorre CNP on 4/22/2021 at 4:19 PM

## 2021-04-23 LAB
CULTURE: NORMAL
Lab: NORMAL
SPECIMEN DESCRIPTION: NORMAL

## 2021-04-28 ENCOUNTER — OFFICE VISIT (OUTPATIENT)
Dept: OBGYN | Age: 86
End: 2021-04-28
Payer: MEDICARE

## 2021-04-28 VITALS
HEIGHT: 60 IN | BODY MASS INDEX: 18.26 KG/M2 | SYSTOLIC BLOOD PRESSURE: 110 MMHG | DIASTOLIC BLOOD PRESSURE: 64 MMHG | WEIGHT: 93 LBS | HEART RATE: 84 BPM | OXYGEN SATURATION: 95 %

## 2021-04-28 DIAGNOSIS — R39.89 SENSATION OF PRESSURE IN BLADDER AREA: Primary | ICD-10-CM

## 2021-04-28 PROCEDURE — 4040F PNEUMOC VAC/ADMIN/RCVD: CPT | Performed by: OBSTETRICS & GYNECOLOGY

## 2021-04-28 PROCEDURE — 0518F FALL PLAN OF CARE DOCD: CPT | Performed by: OBSTETRICS & GYNECOLOGY

## 2021-04-28 PROCEDURE — 3288F FALL RISK ASSESSMENT DOCD: CPT | Performed by: OBSTETRICS & GYNECOLOGY

## 2021-04-28 PROCEDURE — 99214 OFFICE O/P EST MOD 30 MIN: CPT

## 2021-04-28 PROCEDURE — G8427 DOCREV CUR MEDS BY ELIG CLIN: HCPCS | Performed by: OBSTETRICS & GYNECOLOGY

## 2021-04-28 PROCEDURE — 4004F PT TOBACCO SCREEN RCVD TLK: CPT | Performed by: OBSTETRICS & GYNECOLOGY

## 2021-04-28 PROCEDURE — G8419 CALC BMI OUT NRM PARAM NOF/U: HCPCS | Performed by: OBSTETRICS & GYNECOLOGY

## 2021-04-28 PROCEDURE — 99203 OFFICE O/P NEW LOW 30 MIN: CPT | Performed by: OBSTETRICS & GYNECOLOGY

## 2021-04-28 PROCEDURE — 1123F ACP DISCUSS/DSCN MKR DOCD: CPT | Performed by: OBSTETRICS & GYNECOLOGY

## 2021-04-28 PROCEDURE — 1090F PRES/ABSN URINE INCON ASSESS: CPT | Performed by: OBSTETRICS & GYNECOLOGY

## 2021-04-28 NOTE — PROGRESS NOTES
Debra Becker  4/28/2021      Debra Becker is a 80 y.o. female No obstetric history on file. The patient was seen today. She was here for the diagnosis of:    ICD-10-CM    1. Sensation of pressure in bladder area  R39.89        Her bowels are regular and she is voiding without difficulty. She has had a bladder UTI and was treated by her PCP with Macrobid. She has proper hygiene. She denies any pain with voids or EM. She does have mild incontinence. Her recent uti with culture was negative. 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     GERD (gastroesophageal reflux disease)     Hyperlipidemia     Hypothyroidism     Intestinal adhesions     Iron deficiency anemia     Microalbuminuria     Migraines     Non-ST elevation myocardial infarction (NSTEMI) ([facility])     anteroseptal    Osteoarthritis     Osteoporosis     Palpitations     Pseudophakos     Bilateral.    Radiation exposure age 2    nasopharyngeal radiation    Scoliosis     Tobacco abuse          Past Surgical History:   Procedure Laterality Date    ABDOMINAL EXPLORATION SURGERY  1950    with lysis of adhesions    APPENDECTOMY  1937    CATARACT REMOVAL WITH IMPLANT Right 04/25/2000    CATARACT REMOVAL WITH IMPLANT Left 07/18/2000    CHOLECYSTECTOMY, OPEN  1982    COLONOSCOPY  01/17/2011    Good anastomosis from previous diverticular resection, residual diverticulosis.  COLONOSCOPY  09/29/2008    With EGD showing mild gastritis, moderate sigmiod diverticulosis, internal hemorrhoids.  COLONOSCOPY  10/24/2001    With EGD showing mild antral gastritis, mild erythema of the cecum, small internal hemorrhoid. [address] [facility] [address]    [place] treatment to both ears.  OTHER SURGICAL HISTORY Right 06/06/2000    Laser treatment of \"secondary cataract\".  OTHER SURGICAL HISTORY  10/07/2008    capsule endoscopy    PARTIAL HYSTERECTOMY  1955    Left ovary, tube and uterus removed.  SALPINGO-OOPHORECTOMY  1951    Right ovary and tube removed.  SIGMOIDECTOMY      laparoscopic    SMALL INTESTINE SURGERY  12/05/2008    And lysis of adhesions for diverticular disease. Hilary Gibbons    UPPER GASTROINTESTINAL ENDOSCOPY  01/17/2011    Mild gastritis.  UPPER GASTROINTESTINAL ENDOSCOPY  10/22/2012    Irritation at the end of the esophagus.  UPPER GASTROINTESTINAL ENDOSCOPY  11/15/2004    Normal.         Family History   Problem Relation Age of Onset    Diabetes Mother     Heart Disease Mother     Stroke Mother     Kidney Disease Mother     Other Mother         Bowel problems.     Alcohol Abuse Father     Heart Disease Other     Stroke Other     Diabetes Other     Mult Sclerosis Other     Diabetes Other     High Blood Pressure Other     High Cholesterol Other     Glaucoma Neg Hx     Cataracts Neg Hx          Social History     Tobacco Use    Smoking status: Current Every Day Smoker     Packs/day: 1.00     Years: 68.00     Pack years: 68.00     Types: Cigarettes    Smokeless tobacco: Never Used   Substance Use Topics    Alcohol use: No    Drug use: No         MEDICATIONS:  Current Outpatient Medications   Medication Sig Dispense Refill    LORazepam (ATIVAN) 1 MG tablet TAKE ONE TABLET BY MOUTH EVERY 6 HOURS AS NEEDED FOR ANXIETY FOR UP TO 30 DAYS 120 tablet 0    Handicap Placard MISC by Does not apply route . Expires 03/16/2024 1 each 0    nitroGLYCERIN (NITROSTAT) 0.4 MG SL tablet Take one tablet under the tongue as needed for chest pain. May repeat every 5 minutes for up to 3 doses. If no improvement, go to ER 25 tablet 3    mirtazapine (REMERON) 15 MG tablet Take 0.5 tablets by mouth nightly 30 tablet 3    Misc. Devices MISC Nebulizer machine Dx J44.19 1 Device 0    albuterol (PROVENTIL) (2.5 MG/3ML) 0.083% nebulizer solution Take 3 mLs by nebulization every 6 hours as needed for Wheezing 120 each 3    ferrous gluconate (FERGON) 324 (38 Fe) MG tablet Take 324 mg by mouth daily (with breakfast)      albuterol sulfate HFA (PROVENTIL HFA) 108 (90 Base) MCG/ACT inhaler Inhale 2 puffs into the lungs every 6 hours as needed for Wheezing or Shortness of Breath 1 Inhaler 3    Fiber POWD Take by mouth nightly      Multiple Vitamins-Minerals (I-DANIA) TABS Take 1 tablet by mouth 2 times daily      Propylene Glycol (SYSTANE BALANCE OP) Apply 1 drop to eye 3 times daily       acetaminophen (TYLENOL) 500 MG tablet Take 500 mg by mouth every 6 hours as needed for Pain.  Probiotic Product (PROBIOTIC DAILY PO) Take 1 tablet by mouth daily TruBiotics      Multiple Vitamins-Minerals (MULTIVITAMIN & MINERAL PO) Take 1 tablet by mouth daily.  Cholecalciferol (VITAMIN D3) 1000 UNITS TABS Take 1 tablet by mouth daily.  docusate sodium (COLACE) 100 MG capsule Take 100 mg by mouth 2 times daily       ascorbic acid (VITAMIN C) 500 MG tablet Take 1,000 mg by mouth daily.  Biotin 5000 MCG CAPS Take 2 tablets by mouth daily        No current facility-administered medications for this visit.           ALLERGIES:  Allergies as of 04/28/2021 - Review Complete 04/28/2021   Allergen Reaction Noted    Acyclovir and related Diarrhea and Nausea Only 05/06/2014    Albuterol  06/20/2013    Amitriptyline  06/20/2013    Asa [aspirin]  06/20/2013    Betadine [povidone iodine]  06/20/2013    Biaxin [clarithromycin] Nausea Only 11/01/2013    Buspar [buspirone]  06/20/2013    Cefuroxime axetil  06/20/2013    Celestone [betamethasone]  11/01/2013    Codeine  06/20/2013    Darvocet a500 [propoxyphene n-acetaminophen]  11/01/2013    Dicyclomine hcl  06/20/2013    Doxycycline Nausea Only 06/20/2013    Esomeprazole magnesium  06/20/2013    Fluconazole  11/01/2013    Lidex [fluocinolone]  11/01/2013    Neomycin  12/08/2014    Pcn [penicillins] Hives 06/20/2013    Protonix [pantoprazole]  11/01/2013    Quinolones Hives 11/01/2013    Statins [statins]  06/20/2013    Tape [adhesive tape]  06/20/2013    Tramadol  06/27/2016    Valium  06/20/2013    Vicodin [hydrocodone-acetaminophen]  06/20/2013    Zithromax [azithromycin]  05/06/2014    Hydrocodone Nausea Only 06/20/2013         Blood pressure 110/64, pulse 84, height 5' (1.524 m), weight 93 lb (42.2 kg), SpO2 95 %. Chaperone for Intimate Exam   Chaperone was offered and accepted as part of the rooming process.  Chaperone: Magdalena Hope    Limited due to pt age and req    Abdomen: Soft non-tender; good bowel sounds. No guarding, rebound or rigidity. No CVA tenderness bilaterally. Extremities: No calf tenderness, or edema bl    Pelvic: Requested   External genitalia: hair loss and fat pad loss  Urinary system: urethral meatus normal and smooth nt bladder  Vaginal: introitus contracted and only 2 cm circumferential  Cervix: not able to be reached(discomfort by pt at introitus)  Adnexa: non palpable and removed surgically  Uterus: absent and removed surgically    Diagnostics:  Xr Chest (2 Vw)    Result Date: 4/22/2021  EXAMINATION: TWO XRAY VIEWS OF THE CHEST 4/22/2021 2:30 pm COMPARISON: Chest radiograph performed 02/13/2018.  HISTORY: ORDERING SYSTEM PROVIDED HISTORY: BELL (dyspnea on exertion) TECHNOLOGIST PROVIDED HISTORY: Bell Reason for Exam: C/o cough and constipation Acuity: Acute Type of Exam: Initial FINDINGS: There is chronic pulmonary change. There are small bilateral effusions with adjacent atelectasis. There is no pneumothorax. The mediastinal structures are unremarkable. The upper abdomen is unremarkable. The extrathoracic soft tissues are unremarkable. Chronic pulmonary change with small bilateral effusions and adjacent atelectasis. Xr Abdomen (kub) (single Ap View)    Result Date: 4/22/2021  EXAMINATION: ONE SUPINE XRAY VIEW(S) OF THE ABDOMEN 4/22/2021 2:30 pm COMPARISON: None. HISTORY: ORDERING SYSTEM PROVIDED HISTORY: Constipation, unspecified constipation type TECHNOLOGIST PROVIDED HISTORY: Reason for Exam: C/o cough and constipation Acuity: Acute Type of Exam: Initial FINDINGS: There is a marked levoscoliosis of the thoracolumbar junction with degenerative changes of the lumbar spine. No aggressive bony lesion There is gas throughout the GI tract to the rectosigmoid colon with a small right colonic fecal burden. There are multiple loops of mildly distended small bowel. No indirect findings to suggest free air There are no definite urinary tract or other significant calcifications     Nonobstructive bowel gas pattern, findings suggest mild ileus         Lab Results:  Results for orders placed or performed during the hospital encounter of 04/22/21   Culture, Urine    Specimen: Urine, clean catch   Result Value Ref Range    Specimen Description . CLEAN CATCH URINE     Special Requests NOT REPORTED     Culture NO SIGNIFICANT GROWTH    Comprehensive Metabolic Panel   Result Value Ref Range    Glucose 120 (H) 70 - 99 mg/dL    BUN 20 8 - 23 mg/dL    CREATININE 0.66 0.50 - 0.90 mg/dL    Bun/Cre Ratio 30 (H) 9 - 20    Calcium 10.0 8.6 - 10.4 mg/dL    Sodium 135 135 - 144 mmol/L    Potassium 4.8 3.7 - 5.3 mmol/L    Chloride 98 98 - 107 mmol/L    CO2 31 20 - 31 mmol/L    Anion Gap 6 (L) 9 - 17 mmol/L    Alkaline Phosphatase 101 35 - 104 U/L    ALT 12 5 - 33 U/L    AST 26 <32 U/L    Total Bilirubin 0.27 (L) 0.3 - 1.2 mg/dL    Total Protein 7.2 6.4 - 8.3 g/dL    Albumin 4.1 3.5 - 5.2 g/dL    Albumin/Globulin Ratio 1.3 1.0 - 2.5    GFR Non-African American >60 >60 mL/min    GFR African American >60 >60 mL/min    GFR Comment          GFR Staging NOT REPORTED    Brain Natriuretic Peptide   Result Value Ref Range    Pro- (H) <300 pg/mL    BNP Interpretation Pro-BNP Reference Range:    CBC Auto Differential   Result Value Ref Range    WBC 9.8 3.5 - 11.3 k/uL    RBC 4.02 3.95 - 5.11 m/uL    Hemoglobin 9.7 (L) 11.9 - 15.1 g/dL    Hematocrit 32.7 (L) 36.3 - 47.1 %    MCV 81.3 (L) 82.6 - 102.9 fL    MCH 24.1 (L) 25.2 - 33.5 pg    MCHC 29.7 25.2 - 33.5 g/dL    RDW 18.4 (H) 11.8 - 14.4 %    Platelets 673 467 - 167 k/uL    MPV 8.8 8.1 - 13.5 fL    NRBC Automated 0.0 0.0 per 100 WBC    Differential Type NOT REPORTED     Seg Neutrophils 72 (H) 36 - 65 %    Lymphocytes 10 (L) 24 - 43 %    Monocytes 9 3 - 12 %    Eosinophils % 8 (H) 1 - 4 %    Basophils 1 0 - 2 %    Immature Granulocytes 0 0 %    Segs Absolute 7.03 1.50 - 8.10 k/uL    Absolute Lymph # 0.96 (L) 1.10 - 3.70 k/uL    Absolute Mono # 0.86 0.10 - 1.20 k/uL    Absolute Eos # 0.80 (H) 0.00 - 0.44 k/uL    Basophils Absolute 0.06 0.00 - 0.20 k/uL    Absolute Immature Granulocyte 0.04 0.00 - 0.30 k/uL    WBC Morphology NOT REPORTED     RBC Morphology ANISOCYTOSIS PRESENT     Platelet Estimate NOT REPORTED    Urinalysis Reflex to Culture   Result Value Ref Range    Color, UA NOT REPORTED YELLOW    Turbidity UA NOT REPORTED CLEAR    Glucose, Ur NEGATIVE NEGATIVE    Bilirubin Urine NEGATIVE NEGATIVE    Ketones, Urine TRACE (A) NEGATIVE    Specific Gravity, UA 1.015 1.010 - 1.025    Urine Hgb NEGATIVE NEGATIVE    pH, UA 6.5 (H) 5.0 - 6.0    Protein, UA 1+ (A) NEGATIVE    Urobilinogen, Urine Normal Normal    Nitrite, Urine NEGATIVE NEGATIVE    Leukocyte Esterase, Urine NEGATIVE NEGATIVE    Urinalysis Comments NOT REPORTED    Microscopic Urinalysis   Result Value Ref Range    -          WBC, UA 0 TO 4 0 - 4 /HPF    RBC, UA 0 TO 4 0 - 4 /HPF    Casts UA NOT REPORTED 0 - 2 /LPF    Crystals, UA NOT REPORTED None /HPF    Epithelial Cells UA 0 TO 4 0 - 5 /HPF    Renal Epithelial, UA NOT REPORTED 0 /HPF    Bacteria, UA 2+ (A) None    Mucus, UA NOT REPORTED None    Trichomonas, UA NOT REPORTED None    Amorphous, UA NOT REPORTED None    Other Observations UA NOT REPORTED NOT REQ. Yeast, UA NOT REPORTED None           Assessment:   Diagnosis Orders   1. Sensation of pressure in bladder area       Chief Complaint   Patient presents with    Other     increase bladder incont and states it feels like it is falling. worst the last few weeks         Patient Active Problem List    Diagnosis Date Noted    Overactive bladder 12/15/2020    Controlled type 2 diabetes mellitus without complication, without long-term current use of insulin ([facility] [address] 10/04/2019    Chronic maxillary sinusitis 12/21/2018    Major depressive disorder with single episode, in partial remission ([facility] [address] 12/21/2018    Blurred vision 06/26/2014    H/O diplopia 06/26/2014    Hypothyroidism     Hyperlipidemia     Chronic obstructive pulmonary disease ([facility])     Fatigue     Tobacco abuse     Osteoporosis     Dry senile macular degeneration     Osteoarthritis     Coronary artery disease     Chronic constipation     Iron deficiency anemia secondary to inadequate dietary iron intake     Anxiety     GERD (gastroesophageal reflux disease)        PLAN:  Return if symptoms worsen or fail to improve. If persists or worsening referral to urology  Hygiene review completed  Pyridium RBA discussed. Will hold off at this time  Return to the office prn  Counseled on preventative health maintenance follow-up. No orders of the defined types were placed in this encounter.           The patient, Noa Hernandez is a 80 y.o. female, was seen with a total time spent of 30 minutes for the visit on this date of service by the E/M provider. The time component had both face to face and non face to face time spent in determining the total time component. Counseling and education regarding her diagnosis listed below and her options regarding those diagnoses were also included in determining her time component. Diagnosis Orders   1. Sensation of pressure in bladder area          The patient had her preventative health maintenance recommendations and follow-up reviewed with her at the completion of her visit.

## 2021-05-03 ENCOUNTER — TELEPHONE (OUTPATIENT)
Dept: INTERNAL MEDICINE | Age: 86
End: 2021-05-03

## 2021-05-03 NOTE — TELEPHONE ENCOUNTER
Spoke with daughter Saray Brice)- pt's swelling is a little worse than last week. 04/22/21 . appt scheduled with Zhen Henderson on wed (daughter cannot bring her tomorrow- lives 1 1/2 hours away). Instructed her to take pt to UC/ER if swelling worsens or she develops SOB. Daughter voices understanding.

## 2021-05-03 NOTE — TELEPHONE ENCOUNTER
Pt's daughter called today asking about the blood work that she had done and wanting to know the results I did not see any result notes for the BNP she had done so I was unsure what to tell her. [address] also stated jignesh Rice is still having a lot of swelling in her feet and legs. Would like a call back from [address] [place] [address].

## 2021-05-05 ENCOUNTER — OFFICE VISIT (OUTPATIENT)
Dept: INTERNAL MEDICINE | Age: 86
End: 2021-05-05
Payer: MEDICARE

## 2021-05-05 VITALS
OXYGEN SATURATION: 95 % | SYSTOLIC BLOOD PRESSURE: 132 MMHG | RESPIRATION RATE: 18 BRPM | DIASTOLIC BLOOD PRESSURE: 78 MMHG | HEART RATE: 80 BPM

## 2021-05-05 DIAGNOSIS — M79.89 BILATERAL SWELLING OF FEET: ICD-10-CM

## 2021-05-05 DIAGNOSIS — R94.31 ABNORMAL EKG: ICD-10-CM

## 2021-05-05 DIAGNOSIS — J43.8 OTHER EMPHYSEMA (HCC): ICD-10-CM

## 2021-05-05 DIAGNOSIS — R06.09 DOE (DYSPNEA ON EXERTION): Primary | ICD-10-CM

## 2021-05-05 PROCEDURE — G8926 SPIRO NO PERF OR DOC: HCPCS | Performed by: NURSE PRACTITIONER

## 2021-05-05 PROCEDURE — 3023F SPIROM DOC REV: CPT | Performed by: NURSE PRACTITIONER

## 2021-05-05 PROCEDURE — G8427 DOCREV CUR MEDS BY ELIG CLIN: HCPCS | Performed by: NURSE PRACTITIONER

## 2021-05-05 PROCEDURE — 1090F PRES/ABSN URINE INCON ASSESS: CPT | Performed by: NURSE PRACTITIONER

## 2021-05-05 PROCEDURE — 3288F FALL RISK ASSESSMENT DOCD: CPT | Performed by: NURSE PRACTITIONER

## 2021-05-05 PROCEDURE — G8419 CALC BMI OUT NRM PARAM NOF/U: HCPCS | Performed by: NURSE PRACTITIONER

## 2021-05-05 PROCEDURE — 4004F PT TOBACCO SCREEN RCVD TLK: CPT | Performed by: NURSE PRACTITIONER

## 2021-05-05 PROCEDURE — 4040F PNEUMOC VAC/ADMIN/RCVD: CPT | Performed by: NURSE PRACTITIONER

## 2021-05-05 PROCEDURE — 93005 ELECTROCARDIOGRAM TRACING: CPT | Performed by: NURSE PRACTITIONER

## 2021-05-05 PROCEDURE — 93010 ELECTROCARDIOGRAM REPORT: CPT | Performed by: NURSE PRACTITIONER

## 2021-05-05 PROCEDURE — 0518F FALL PLAN OF CARE DOCD: CPT | Performed by: NURSE PRACTITIONER

## 2021-05-05 PROCEDURE — 1123F ACP DISCUSS/DSCN MKR DOCD: CPT | Performed by: NURSE PRACTITIONER

## 2021-05-05 PROCEDURE — 99214 OFFICE O/P EST MOD 30 MIN: CPT | Performed by: NURSE PRACTITIONER

## 2021-05-05 RX ORDER — FUROSEMIDE 20 MG/1
20 TABLET ORAL DAILY
Qty: 10 TABLET | Refills: 0 | Status: SHIPPED | OUTPATIENT
Start: 2021-05-05 | End: 2021-01-01

## 2021-05-05 NOTE — PROGRESS NOTES
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  GERD (gastroesophageal reflux disease)     Hyperlipidemia     Hypothyroidism     Intestinal adhesions     Iron deficiency anemia     Microalbuminuria     Migraines     Non-ST elevation myocardial infarction (NSTEMI) ([facility])     anteroseptal    Osteoarthritis     Osteoporosis     Palpitations     Pseudophakos     Bilateral.    Radiation exposure age 2    nasopharyngeal radiation    Scoliosis     Tobacco abuse        Past Surgical History:   Procedure Laterality Date    ABDOMINAL EXPLORATION SURGERY  1950    with lysis of adhesions    APPENDECTOMY  1937    CATARACT REMOVAL WITH IMPLANT Right 04/25/2000    CATARACT REMOVAL WITH IMPLANT Left 07/18/2000    CHOLECYSTECTOMY, OPEN  1982    COLONOSCOPY  01/17/2011    Good anastomosis from previous diverticular resection, residual diverticulosis.  COLONOSCOPY  09/29/2008    With EGD showing mild gastritis, moderate sigmiod diverticulosis, internal hemorrhoids.  COLONOSCOPY  10/24/2001    With EGD showing mild antral gastritis, mild erythema of the cecum, small internal hemorrhoid. [address] [facility]    [place] treatment to both ears.  OTHER SURGICAL HISTORY Right 06/06/2000    Laser treatment of \"secondary cataract\".  OTHER SURGICAL HISTORY  10/07/2008    capsule endoscopy    PARTIAL HYSTERECTOMY  1955    Left ovary, tube and uterus removed.  SALPINGO-OOPHORECTOMY  1951    Right ovary and tube removed.  SIGMOIDECTOMY      laparoscopic    SMALL INTESTINE SURGERY  12/05/2008    And lysis of adhesions for diverticular disease.     TONSILLECTOMY AND ADENOIDECTOMY [facility]      Dr. Kristofer Porras    UPPER GASTROINTESTINAL ENDOSCOPY  01/17/2011    Mild gastritis.  UPPER GASTROINTESTINAL ENDOSCOPY  10/22/2012    Irritation at the end of the esophagus.  UPPER GASTROINTESTINAL ENDOSCOPY  11/15/2004    Normal.       Current Outpatient Medications on File Prior to Visit   Medication Sig Dispense Refill    LORazepam (ATIVAN) 1 MG tablet TAKE ONE TABLET BY MOUTH EVERY 6 HOURS AS NEEDED FOR ANXIETY FOR UP TO 30 DAYS 120 tablet 0    Handicap Placard MISC by Does not apply route . Expires 03/16/2024 1 each 0    nitroGLYCERIN (NITROSTAT) 0.4 MG SL tablet Take one tablet under the tongue as needed for chest pain. May repeat every 5 minutes for up to 3 doses. If no improvement, go to ER 25 tablet 3    mirtazapine (REMERON) 15 MG tablet Take 0.5 tablets by mouth nightly 30 tablet 3    Misc. Devices MISC Nebulizer machine Dx J44.19 1 Device 0    albuterol (PROVENTIL) (2.5 MG/3ML) 0.083% nebulizer solution Take 3 mLs by nebulization every 6 hours as needed for Wheezing 120 each 3    ferrous gluconate (FERGON) 324 (38 Fe) MG tablet Take 324 mg by mouth daily (with breakfast)      albuterol sulfate HFA (PROVENTIL HFA) 108 (90 Base) MCG/ACT inhaler Inhale 2 puffs into the lungs every 6 hours as needed for Wheezing or Shortness of Breath 1 Inhaler 3    Fiber POWD Take by mouth nightly      Multiple Vitamins-Minerals (I-DANIA) TABS Take 1 tablet by mouth 2 times daily      Propylene Glycol (SYSTANE BALANCE OP) Apply 1 drop to eye 3 times daily       acetaminophen (TYLENOL) 500 MG tablet Take 500 mg by mouth every 6 hours as needed for Pain.  Probiotic Product (PROBIOTIC DAILY PO) Take 1 tablet by mouth daily TruBiotics      Multiple Vitamins-Minerals (MULTIVITAMIN & MINERAL PO) Take 1 tablet by mouth daily.  Cholecalciferol (VITAMIN D3) 1000 UNITS TABS Take 1 tablet by mouth daily.       docusate sodium (COLACE) 100 MG capsule Take 100 mg by mouth 2 times daily       ascorbic acid (VITAMIN C) 500 MG tablet Take 1,000 mg by mouth daily.  Biotin 5000 MCG CAPS Take 2 tablets by mouth daily        No current facility-administered medications on file prior to visit. Social History     Socioeconomic History    Marital status:      Spouse name: Not on file    Number of children: Not on file    Years of education: Not on file    Highest education level: Not on file   Occupational History    Not on file   Social Needs    Financial resource strain: Not hard at all    Food insecurity     Worry: Never true     Inability: Never true   [employer] needs     Medical: No     Non-medical: No   Tobacco Use    Smoking status: Current Every Day Smoker     Packs/day: 1.00     Years: 68.00     Pack years: 68.00     Types: Cigarettes    Smokeless tobacco: Never Used   Substance and Sexual Activity    Alcohol use: No    Drug use: No    Sexual activity: Not on file   Lifestyle    Physical activity     Days per week: Not on file     Minutes per session: Not on file    Stress: Not on file   Relationships    Social connections     Talks on phone: Not on file     Gets together: Not on file     Attends [employer] service: Not on file     Active member of club or organization: Not on file     Attends meetings of clubs or organizations: Not on file     Relationship status: Not on file    Intimate partner violence     Fear of current or ex partner: Not on file     Emotionally abused: Not on file     Physically abused: Not on file     Forced sexual activity: Not on file   Other Topics Concern    Not on file   Social History Narrative    Not on file       Review of Systems   Respiratory: Positive for cough (Chronic states over 70-year smoker) and shortness of breath. Cardiovascular: Positive for leg swelling. Negative for chest pain. Physical Exam  Vitals signs and nursing note reviewed.    Constitutional:       General: She is not in acute distress. Appearance: Normal appearance. She is well-developed. She is not diaphoretic. HENT:      Head: Normocephalic and atraumatic. Right Ear: External ear normal.      Left Ear: External ear normal.   Eyes:      General:         Right eye: No discharge. Left eye: No discharge. Neck:      Trachea: No tracheal deviation. Cardiovascular:      Rate and Rhythm: Normal rate and regular rhythm. Heart sounds: Murmur (Faint murmur) present. No friction rub. No gallop. Pulmonary:      Effort: Pulmonary effort is normal. No respiratory distress. Breath sounds: Normal breath sounds. No stridor. No wheezing, rhonchi or rales. Comments: Lung sounds clear throughout, slightly diminished in bases prolonged expiratory phase  Chest:      Chest wall: No tenderness. Abdominal:      General: Bowel sounds are normal. There is no distension. Palpations: Abdomen is soft. Tenderness: There is no abdominal tenderness. Musculoskeletal:         General: Swelling (+3 pedal edema) present. Skin:     General: Skin is warm and dry. Capillary Refill: Capillary refill takes less than 2 seconds. Coloration: Skin is not jaundiced or pale. Findings: No rash. Neurological:      General: No focal deficit present. Mental Status: She is alert and oriented to person, place, and time. Cranial Nerves: No cranial nerve deficit. Sensory: No sensory deficit. Coordination: Coordination normal.   Psychiatric:         Mood and Affect: Mood normal.         Behavior: Behavior normal.         Thought Content: Thought content normal.         Judgment: Judgment normal.       Vitals:    05/05/21 1137   BP: 132/78   Site: Left Upper Arm   Position: Sitting   Cuff Size: Small Adult   Pulse: 80   Resp: 18   SpO2: 95%       Assessment:  1. PRICE (dyspnea on exertion)  - EKG 12 lead  - Ambulatory referral to Cardiology  - Echocardiogram complete; Future    2.  Bilateral

## 2021-05-06 ENCOUNTER — TELEPHONE (OUTPATIENT)
Dept: INTERNAL MEDICINE | Age: 86
End: 2021-05-06

## 2021-05-06 NOTE — TELEPHONE ENCOUNTER
Patient was seen in office yesterday. She has less edema today. Took lasix yesterday, today and will franklyn again tomorrow.   MORRO

## 2021-05-07 ASSESSMENT — ENCOUNTER SYMPTOMS
COUGH: 1
SHORTNESS OF BREATH: 1

## 2021-05-19 NOTE — PROGRESS NOTES
changes or diplopia. No scleral icterus. · ENT: No Headaches, hearing loss or vertigo. No mouth sores or sore throat. · Cardiovascular: AS HPI  · Respiratory: AS HPI  · Gastrointestinal: No abdominal pain, appetite loss, blood in stools. No change in bowel or bladder habits. · Genitourinary: No dysuria, trouble voiding, or hematuria. · Musculoskeletal:  No gait disturbance, No weakness or joint complaints. · Integumentary: No rash or pruritis. · Neurological: No headache, diplopia, change in muscle strength, numbness or tingling. No change in gait, balance, coordination, mood, affect, memory, mentation, behavior. · Psychiatric: No new anxiety or depression. · Endocrine: No temperature intolerance. No excessive thirst, fluid intake, or urination. No tremor. · Hematologic/Lymphatic: No abnormal bruising or bleeding, blood clots or swollen lymph nodes. · Allergic/Immunologic: No nasal congestion or hives. Medications:  Current Outpatient Medications   Medication Sig Dispense Refill    LORazepam (ATIVAN) 1 MG tablet TAKE ONE TABLET BY MOUTH EVERY 6 HOURS AS NEEDED FOR ANXIETY FOR UP TO 30 DAYS 120 tablet 0    Handicap Placard [facility] [place] Does not apply route . Expires 03/16/2024 1 each 0    nitroGLYCERIN (NITROSTAT) 0.4 MG SL tablet Take one tablet under the tongue as needed for chest pain. May repeat every 5 minutes for up to 3 doses. If no improvement, go to ER 25 tablet 3    mirtazapine (REMERON) 15 MG tablet Take 0.5 tablets by mouth nightly 30 tablet 3    Misc.  Devices MISC Nebulizer machine Dx J44.19 1 Device 0    albuterol (PROVENTIL) (2.5 MG/3ML) 0.083% nebulizer solution Take 3 mLs by nebulization every 6 hours as needed for Wheezing 120 each 3    ferrous gluconate (FERGON) 324 (38 Fe) MG tablet Take 324 mg by mouth daily (with breakfast)      albuterol sulfate HFA (PROVENTIL HFA) 108 (90 Base) MCG/ACT inhaler Inhale 2 puffs into the lungs every 6 hours as needed for Wheezing or Shortness of Breath 1 Inhaler 3    Fiber POWD Take by mouth nightly      Multiple Vitamins-Minerals (I-DANIA) TABS Take 1 tablet by mouth 2 times daily      Propylene Glycol (SYSTANE BALANCE OP) Apply 1 drop to eye 3 times daily       acetaminophen (TYLENOL) 500 MG tablet Take 500 mg by mouth every 6 hours as needed for Pain.  Probiotic Product (PROBIOTIC DAILY PO) Take 1 tablet by mouth daily TruBiotics      Multiple Vitamins-Minerals (MULTIVITAMIN & MINERAL PO) Take 1 tablet by mouth daily.  Cholecalciferol (VITAMIN D3) 1000 UNITS TABS Take 1 tablet by mouth daily.  docusate sodium (COLACE) 100 MG capsule Take 100 mg by mouth 2 times daily       ascorbic acid (VITAMIN C) 500 MG tablet Take 1,000 mg by mouth daily.  Biotin 5000 MCG CAPS Take 2 tablets by mouth daily       furosemide (LASIX) 20 MG tablet Take 1 tablet by mouth daily for 10 days One tablet daily for 3 days then as needed for swelling 10 tablet 0     No current facility-administered medications for this visit. Physical Exam:   Vitals: BP (!) 141/64   Wt 94 lb (42.6 kg)   BMI 18.36 kg/m²   General appearance: alert and cooperative with exam  HEENT: Head: Normocephalic, no lesions, without obvious abnormality.   Neck: no carotid bruit, no JVD  Lungs: clear to auscultation bilaterally  Heart: regular rate and rhythm, S1, S2 normal, no murmur, click, rub or gallop  Abdomen: soft, non-tender; bowel sounds normal; no masses,  no organomegaly  Extremities: extremities normal, atraumatic, no cyanosis or edema  Neurologic: Mental status: Alert, oriented, thought content appropriate    Labs:  Lab Results   Component Value Date    CHOL 169 12/15/2020    TRIG 85 12/15/2020    HDL 73 12/15/2020    LDLCHOLESTEROL 79 12/15/2020    VLDL NOT REPORTED 12/15/2020    CHOLHDLRATIO 2.3 12/15/2020       Lab Results   Component Value Date     04/22/2021    K 4.8 04/22/2021    CL 98 04/22/2021    CO2 31 04/22/2021    BUN 20 04/22/2021    CREATININE 0.66 04/22/2021    GLUCOSE 120 (H) 04/22/2021    CALCIUM 10.0 04/22/2021    PROT 7.2 04/22/2021    LABALBU 4.1 04/22/2021    BILITOT 0.27 (L) 04/22/2021    ALKPHOS 101 04/22/2021    AST 26 04/22/2021    ALT 12 04/22/2021    LABGLOM >60 04/22/2021    GFRAA >60 04/22/2021       EKG:  Sinus  Rhythm   -Poor R-wave progression -nonspecific -consider old anterior infarct. Echo 5/21:  Normal left ventricular diameter. Left ventricular systolic function is normal. Left ventricular ejection fraction 55 %. Grade I (mild) left ventricular diastolic dysfunction. Mild aortic valve sclerosis. Mild aortic insufficiency. Mild mitral valve thickening. Mild mitral regurgitation. Mild tricuspid regurgitation. Estimated right ventricular systolic pressure is 59 mmHg. Moderate pulmonary hypertension. IVC Increased diameter and impaired or no inspiratory variation indicating elevated RA filling pressure (i.e. CVP). Past Medical and Surgical History, Problem List, Allergies, Medications, Labs, Imaging, all reviewed extensively in EMR and with the patient. Assessment:  - Acute Diastolic HF  - HTN  - LE edema- improved  - SOB- improved  - Mild AI, MR, TR  - Moderate P HTN- RVSP 59    Plan:  - lasix was only short term script  - stop lasix  - start HCTZ 25 mg po qd  - BMP in 2 weeks  - monitor for swelling / sob and call  - discussed possible stress testing, she and daughter would like to hold off for now, unless she develops chest pains    The patient is to continue heart healthy diet, weight loss and exercise as tolerated. Patient's medications and side effects were discussed. Medication refills were provided if needed. Follow up appointment timing was discussed. All questions and concerns were addressed to patient's satisfaction. The patient is to follow up in 6 months or sooner if necessary.      Thank you for allowing me to participate in the care of this patient, please do not hesitate to call if you have any questions. Gena Kebede DO, [facility] [place], [address], [place] [address] [facility]  [email or web address]  [phone]

## 2021-07-13 NOTE — PROGRESS NOTES
[facility] Internal Medicine  [address] [place], [address] Kaia Rossi  [phone]      Rhiannon Bean c/o of COPD (4 month appt) and Diabetes (4 month appt)      HPI:     HPI  Patient presents for evaluation and management of chronic medical conditions as noted below. Diabetes is stable, most recent A1C 5.7. Discussed adequate dietary intake as she is currently borderline underweight. Not currently on medication for hyperlipidemia due to age. Coronary artery disease stable. She denies any chest pain or dyspnea. Has not been taking NTG. In April, she developed dyspnea on exertion and BLE edema since previous visit. Was evaluated in internal med, noted to have internal med. Echo was obtained and patient was referred to cardiology. Was diagnosed with acute diastolic heart failure and started on HCTZ. She was offered a stress test, but daughter and patient have not yet decided if they would like to pursue this. Daughter does note they plan to have a detailed discussion about her advanced planning and how aggressively she would like to pursue any additional testing. Hypothyroidism stable without medication, most recent TSH within normal limits. COPD stable with PRN albuterol. Mild anemia noted previously - has not been taking iron supplements due to constipation. Discussed restarting this with vitamin C, patient is agreeable. Depression and anxiety are well controlled on sertraline and Ativan. As previously noted patient has been stable on Ativan for nearly 3 decades and previously reviewed risks and benefits of continuing long-term benzodiazepines in elderly with both patient and daughter. Arthritis stable. Continues on supplement for osteoporosis. Chronic sinusitis stable. Has been having increasing reflux symptoms. Has also been having some worsening nausea. Discussed trial of pepcid given she has PPIs listed as allergies.       Current Outpatient Medications Medication Sig Dispense Refill    Cholecalciferol (VITAMIN D3) 50 MCG (2000 UT) CAPS Take 2,000 Units by mouth daily      LORazepam (ATIVAN) 1 MG tablet TAKE ONE TABLET BY MOUTH EVERY 6 HOURS AS NEEDED FOR ANXIETY FOR UP TO 30 DAYS 120 tablet 0    famotidine (PEPCID) 20 MG tablet Take 1 tablet by mouth daily 60 tablet 3    hydroCHLOROthiazide (HYDRODIURIL) 25 MG tablet Take 1 tablet by mouth every morning 90 tablet 1    Handicap Placard MISC by Does not apply route . Expires 03/16/2024 1 each 0    nitroGLYCERIN (NITROSTAT) 0.4 MG SL tablet Take one tablet under the tongue as needed for chest pain. May repeat every 5 minutes for up to 3 doses. If no improvement, go to ER 25 tablet 3    mirtazapine (REMERON) 15 MG tablet Take 0.5 tablets by mouth nightly 30 tablet 3    Misc. Devices MISC Nebulizer machine Dx J44.19 1 Device 0    albuterol (PROVENTIL) (2.5 MG/3ML) 0.083% nebulizer solution Take 3 mLs by nebulization every 6 hours as needed for Wheezing 120 each 3    albuterol sulfate HFA (PROVENTIL HFA) 108 (90 Base) MCG/ACT inhaler Inhale 2 puffs into the lungs every 6 hours as needed for Wheezing or Shortness of Breath 1 Inhaler 3    Fiber POWD Take by mouth nightly      Multiple Vitamins-Minerals (I-DANIA) TABS Take 1 tablet by mouth 2 times daily      Propylene Glycol (SYSTANE BALANCE OP) Apply 1 drop to eye 3 times daily       acetaminophen (TYLENOL) 500 MG tablet Take 500 mg by mouth every 6 hours as needed for Pain.  Probiotic Product (PROBIOTIC DAILY PO) Take 1 tablet by mouth daily TruBiotics      Multiple Vitamins-Minerals (MULTIVITAMIN & MINERAL PO) Take 1 tablet by mouth daily.  docusate sodium (COLACE) 100 MG capsule Take 100 mg by mouth 2 times daily       ascorbic acid (VITAMIN C) 500 MG tablet Take 1,000 mg by mouth daily.       Biotin 5000 MCG CAPS Take 2 tablets by mouth daily       ferrous gluconate (FERGON) 324 (38 Fe) MG tablet Take 324 mg by mouth daily (with breakfast) (Patient not taking: Reported on 7/13/2021)       No current facility-administered medications for this visit. Allergies   Allergen Reactions    Acyclovir And Related Diarrhea and Nausea Only    Albuterol     Amitriptyline      Dizziness      Asa [Aspirin]      Stomach upset      Betadine [Povidone Iodine]     Biaxin [Clarithromycin] Nausea Only    Buspar [Buspirone]     Cefuroxime Axetil      Diarrhea      Celestone [Betamethasone]     Codeine     Darvocet A500 [Propoxyphene N-Acetaminophen]     Dicyclomine Hcl     Doxycycline Nausea Only    Esomeprazole Magnesium     Fluconazole     Lidex [Fluocinolone]     Neomycin     Pcn [Penicillins] Hives    Protonix [Pantoprazole]     Quinolones Hives    Statins [Statins]      Leg cramps      Tape [Adhesive Tape]     Tramadol      \"makes her walk into walls\"    Valium     Vicodin [Hydrocodone-Acetaminophen]     Zithromax [Azithromycin]      Nausea      Hydrocodone Nausea Only       Health Maintenance   Topic Date Due    Shingles Vaccine (1 of 2) Never done    DTaP/Tdap/Td vaccine (2 - Tdap) 01/04/2037 (Originally 3/23/2008)    Flu vaccine (1) 09/01/2021    TSH testing  12/15/2021    Annual Wellness Visit (AWV)  03/17/2022    Potassium monitoring  07/13/2022    Creatinine monitoring  07/13/2022    Pneumococcal 65+ years Vaccine  Completed    COVID-19 Vaccine  Completed    Hepatitis A vaccine  Aged Out    Hib vaccine  Aged Out    Meningococcal (ACWY) vaccine  Aged Out       Subjective:      Review of Systems   Constitutional: Negative for chills and fever. HENT: Negative for congestion and sore throat. Ear pressure   Respiratory: Negative for chest tightness and shortness of breath. Cardiovascular: Negative for chest pain and leg swelling. Gastrointestinal: Negative for diarrhea, nausea and vomiting. Genitourinary: Negative for difficulty urinating and dysuria.    Musculoskeletal: Negative for gait problem and myalgias. Neurological: Negative for dizziness and headaches. Psychiatric/Behavioral: Negative for confusion and decreased concentration. Objective:     Vitals:    07/13/21 1333   BP: 120/82   Site: Right Upper Arm   Position: Sitting   Cuff Size: Child   Pulse: 80   Resp: 20   Weight: 95 lb 3.2 oz (43.2 kg)   Height: 5' (1.524 m)     Physical Exam  Vitals and nursing note reviewed. Constitutional:       General: She is not in acute distress. Appearance: She is well-developed. HENT:      Head: Normocephalic and atraumatic. Right Ear: External ear normal.      Left Ear: External ear normal.      Ears:      Comments: L cerumen impaction  Eyes:      General: Lids are normal.      Extraocular Movements: Extraocular movements intact. Conjunctiva/sclera: Conjunctivae normal.   Neck:      Thyroid: No thyromegaly. Cardiovascular:      Rate and Rhythm: Normal rate and regular rhythm. Heart sounds: No murmur heard. Pulmonary:      Effort: Pulmonary effort is normal. No accessory muscle usage or respiratory distress. Breath sounds: Normal breath sounds. No wheezing, rhonchi or rales. Abdominal:      Palpations: Abdomen is soft. Tenderness: There is no abdominal tenderness. There is no guarding or rebound. Musculoskeletal:         General: Normal range of motion. Cervical back: Neck supple. Right lower leg: No edema. Left lower leg: No edema. Lymphadenopathy:      Cervical: No cervical adenopathy. Skin:     General: Skin is warm and dry. Nails: There is no clubbing. Neurological:      Mental Status: She is alert. Coordination: Coordination normal.   Psychiatric:         Mood and Affect: Mood normal.         Speech: Speech normal.         Behavior: Behavior normal.         Assessment/Plan:        1. Controlled type 2 diabetes mellitus without complication, without long-term current use of insulin ([address] stable  - Continue to monitor    2.  Mixed hyperlipidemia, stable  - No longer on medication to minimize polypharmacy    3. Coronary artery disease involving native coronary artery of native heart, unspecified whether angina present, stable  4. Bilateral swelling of feet, stable  - Continue to follow with cardiology    5. Other specified hypothyroidism,  stable  - Continue to monitor    6. Other emphysema ([facility] stable  - Continue to monitor. Patient declines smoking cessation    7. Iron deficiency anemia secondary to inadequate dietary iron intake, stable  - Advised to resume iron supplement  - CBC Auto Differential; Future    8. Chronic constipation, stable  - Advised to monitor for any worsening symptoms when restarting iron supplements    9. Major depressive disorder with single episode, in partial remission ([facility] stable  - Continue to monitor    10. Anxiety, stable  - Risks and benefits of ativan treatment reviewed as noted above. No signs of drug abuse or diversion  - LORazepam (ATIVAN) 1 MG tablet; TAKE ONE TABLET BY MOUTH EVERY 6 HOURS AS NEEDED FOR ANXIETY FOR UP TO 30 DAYS  Dispense: 120 tablet; Refill: 0    11. Primary osteoarthritis involving multiple joints,  stable  - Continue to monitor    12. Osteoporosis, unspecified osteoporosis type, unspecified pathological fracture presence, stable  - Continue supplement    13. Chronic maxillary sinusitis,  stable  - Continue to monitor    14. Gastroesophageal reflux disease without esophagitis, uncontrolled  - Will start pepcid    15. Impacted cerumen of left ear, new problem  - [place] REMOVAL IMPACTED CERUMEN IRRIGATION/LVG UNILAT        Return in about 4 months (around 11/13/2021).     Orders Placed This Encounter   Procedures    CBC Auto Differential     Standing Status:   Future     Number of Occurrences:   1     Standing Expiration Date:   7/13/2022    [place] REMOVAL IMPACTED CERUMEN IRRIGATION/LVG UNILAT     Orders Placed This Encounter   Medications    LORazepam (ATIVAN) 1 MG tablet     Sig: TAKE ONE

## 2021-09-10 NOTE — TELEPHONE ENCOUNTER
Patient called in requesting a refill on ativan sent to [facility]     Medication pended if agreeable    Last Appt:  7/13/2021  Next Appt:   11/15/2021  Med verified in Epic

## 2021-09-10 NOTE — TELEPHONE ENCOUNTER
[place] checked today    Controlled Substance Monitoring:    Acute and Chronic Pain Monitoring:   RX Monitoring 9/10/2021   Attestation -   Acute Pain Prescriptions -   Periodic Controlled Substance Monitoring No signs of potential drug abuse or diversion identified.

## 2021-09-27 NOTE — FLOWSHEET NOTE
rounding on Ed    Assessment: Patient is resting in bed with daughter present for support. She is connected to her [employer]ianist. It is not determined if she will be admitted. Intervention: Engaged in conversation.  offered prayer and blessed her. Patient expressed appreciation for visit and offer of continued prayer. Plan: Chaplains are available on site or on call 24/7 for spiritual and emotional support.      09/27/21 5619   Encounter Summary   Services provided to: Patient and family together   Referral/Consult From: [facility] Family members   Place of [address] [place] [address] Visiting   (9/27/21)   Complexity of Encounter Moderate   Length of Encounter 15 minutes   Spiritual/Yazidi   Type Spiritual support   Assessment Approachable   Intervention Active listening;Prayer   Outcome Expressed gratitude;Engaged in conversation

## 2021-09-27 NOTE — ED PROVIDER NOTES
[address] 69      Pt Name: Melany Samson  MRN: [id number]  [place] 3/16/1927  Date of evaluation: 9/27/2021      CHIEF COMPLAINT       Chief Complaint   Patient presents with    Fall     fell twice today, weakness         HISTORY OF PRESENT ILLNESS      The patient was brought to emergency department for a fall. She is fallen several times this past week. She fell twice today. She has generalized weakness. The family brought her to be evaluated. They are talking about having her placed in an ECF. The patient had an ECF last week and has decided on when she would like to go to. The patient denies pain. She has struck the back of her head and she struck her back. She has not been seen for this previously. She denies any pain however. She denies numbness or tingling. She denies chest pain or shortness of breath. She is not vomiting. Nothing makes her symptoms better or worse otherwise. REVIEW OF SYSTEMS       All systems reviewed and negative unless noted in HPI. The patient denies fever or constitutional symptoms. Denies vision change. Denies any sore throat or rhinorrhea. Denies any neck pain or stiffness. Denies chest pain or shortness of breath. No nausea,  vomiting or diarrhea. Denies any dysuria. Denies urinary frequency or hematuria. Struck head. Injury to back. Denies extremity pain. Neurolysed weakness without focal deficit. Frequent falls. Denies any skin rash or edema. No recent psychiatric issues. No easy bruising or bleeding. Denies any polyuria, polydypsia or history of immunocompromise.        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 SURGICAL HISTORY      has a past surgical history that includes Tonsillectomy and adenoidectomy (1935); Appendectomy (1937); Abdominal exploration surgery (1950); Salpingo-oophorectomy (6884); partial hysterectomy (cervix not removed) (1955); other surgical history (1957); Cholecystectomy, open (1982); Cataract removal with implant (Right, 04/25/2000); Cataract removal with implant (Left, 07/18/2000); other surgical history (Right, 06/06/2000); Small intestine surgery (12/05/2008); Upper gastrointestinal endoscopy (01/17/2011); Colonoscopy (01/17/2011); Upper gastrointestinal endoscopy (10/22/2012); Colonoscopy (09/29/2008); Upper gastrointestinal endoscopy (11/15/2004); Colonoscopy (10/24/2001); sigmoidectomy; Tympanostomy tube placement; and other surgical history (10/07/2008). CURRENT MEDICATIONS       Previous Medications    ACETAMINOPHEN (TYLENOL) 500 MG TABLET    Take 500 mg by mouth every 6 hours as needed for Pain. ALBUTEROL (PROVENTIL) (2.5 MG/3ML) 0.083% NEBULIZER SOLUTION    Take 3 mLs by nebulization every 6 hours as needed for Wheezing    ALBUTEROL SULFATE HFA (PROVENTIL HFA) 108 (90 BASE) MCG/ACT INHALER    Inhale 2 puffs into the lungs every 6 hours as needed for Wheezing or Shortness of Breath    ASCORBIC ACID (VITAMIN C) 500 MG TABLET    Take 1,000 mg by mouth daily.     BIOTIN 5000 MCG CAPS    Take 2 tablets by mouth daily     CHOLECALCIFEROL (VITAMIN D3) 50 MCG (2000 UT) CAPS    Take 2,000 Units by mouth daily    DOCUSATE SODIUM (COLACE) 100 MG CAPSULE    Take 100 mg by mouth 2 times daily     FAMOTIDINE (PEPCID) 20 MG TABLET    Take 1 tablet by mouth daily    FERROUS GLUCONATE (FERGON) 324 (38 FE) MG TABLET    Take 324 mg by mouth daily (with breakfast)     FIBER POWD    Take by mouth nightly    HANDICAP PLACARD MISC    by Does not apply route . Expires 2024    HYDROCHLOROTHIAZIDE (HYDRODIURIL) 25 MG TABLET    Take 1 tablet by mouth every morning    LORAZEPAM (ATIVAN) 1 MG TABLET    TAKE ONE TABLET BY MOUTH EVERY 6 HOURS AS NEEDED FOR ANXIETY FOR UP TO 30 DAYS    MIRTAZAPINE (REMERON) 15 MG TABLET    Take 0.5 tablets by mouth nightly    MISC. DEVICES MISC    Nebulizer machine Dx J44.19    MULTIPLE VITAMINS-MINERALS (I-DANIA) TABS    Take 1 tablet by mouth 2 times daily    MULTIPLE VITAMINS-MINERALS (MULTIVITAMIN & MINERAL PO)    Take 1 tablet by mouth daily. NITROGLYCERIN (NITROSTAT) 0.4 MG SL TABLET    Take one tablet under the tongue as needed for chest pain. May repeat every 5 minutes for up to 3 doses. If no improvement, go to ER    PROBIOTIC PRODUCT (PROBIOTIC DAILY PO)    Take 1 tablet by mouth daily TruBiotics    PROPYLENE GLYCOL (SYSTANE BALANCE OP)    Apply 1 drop to eye 3 times daily        ALLERGIES     is allergic to acyclovir and related, albuterol, amitriptyline, asa [aspirin], betadine [povidone iodine], biaxin [clarithromycin], buspar [buspirone], cefuroxime axetil, celestone [betamethasone], codeine, darvocet a500 [propoxyphene n-acetaminophen], dicyclomine hcl, doxycycline, esomeprazole magnesium, fluconazole, lidex [fluocinolone], neomycin, pcn [penicillins], protonix [pantoprazole], quinolones, statins [statins], tape [adhesive tape], tramadol, valium, vicodin [hydrocodone-acetaminophen], zithromax [azithromycin], and hydrocodone. FAMILY HISTORY     She indicated that her mother is . She indicated that her father is .  She indicated that the status of her neg hx is unknown.     family history includes Alcohol Abuse in her father; Diabetes in her mother and other family members; Heart Disease in her mother and another family member; High Blood Pressure in an other family member; High Cholesterol in an other family member; Kidney Disease in her mother; Mult Sclerosis in an other family member; Other in her mother; Stroke in her mother and another family member. SOCIAL HISTORY      reports that she has been smoking cigarettes. She has a 68.00 pack-year smoking history. She has never used smokeless tobacco. She reports that she does not drink alcohol and does not use drugs. PHYSICAL EXAM     INITIAL VITALS:  height is 5' (1.524 m) and weight is 88 lb 9.6 oz (40.2 kg). Her temperature is 97.5 °F (36.4 °C). Her blood pressure is 121/71 and her pulse is 80. Her respiration is 16 and oxygen saturation is 94%. The patient is alert and oriented, in no apparent distress. HEENT demonstrates no hematoma or abrasion to scalp. Pupils are PERRL at 4 mm with normal extraocular motion. Mucous membranes moist.    Neck is supple with no pain or step-off. Heart sounds regular rate and rhythm with no gallops, murmurs, or rubs. Lungs clear, no wheezes, rales or rhonchi. Abdomen: soft, nontender with no pain to palpation. No pulsatile mass. Normal bowel sounds are noted. No rebound or guarding. Musculoskeletal exam: Abrasions noted on the mid thoracic spine that are not new. No underlying bruising or deformity. No extremity injury. Normal distal pulses in all extremities. Skin: no rash or edema. Neurological exam reveals cranial nerves 2 through 12 grossly intact. Patient has equal  and normal deep tendon reflexes. Psychiatric: no hallucinations or suicidal ideation. Lymphatics.:  No lymphadenopathy.          DIFFERENTIAL DIAGNOSIS/ MDM:     Fall, intracranial hemorrhage, abrasion, fracture, electrolyte imbalance, dehydration, UTI    DIAGNOSTIC RESULTS       RADIOLOGY:   I reviewed the radiologist interpretations:  XR THORACIC SPINE (3 VIEWS)   Final Result   Stable alignment of the spine compared to prior study. CT HEAD WO CONTRAST   Final Result   Chronic involutional changes. No acute disease. XR THORACIC SPINE (3 VIEWS) (Final result)  Result time 09/27/21 16:42:26  Final result by Marysol Jensen MD (09/27/21 16:42:26)                Impression:    Stable alignment of the spine compared to prior study. Narrative:    EXAMINATION:   THREE XRAY VIEWS OF THE THORACIC SPINE     9/27/2021 4:19 pm     COMPARISON:   April 22, 2021     HISTORY:   ORDERING SYSTEM PROVIDED HISTORY: fall   TECHNOLOGIST PROVIDED HISTORY:   fall   Reason for Exam: fell today   Acuity: Acute   Type of Exam: Initial     FINDINGS:   No change in alignment compared to prior study.  No evidence for fracture of   the thoracic spine                     CT HEAD WO CONTRAST (Final result)  Result time 09/27/21 16:31:47  Final result by Alexandra Taylor MD (09/27/21 16:31:47)                Impression:    Chronic involutional changes.  No acute disease. Narrative:    EXAMINATION:   CT OF THE HEAD WITHOUT CONTRAST  9/27/2021 4:00 pm     TECHNIQUE:   CT of the head was performed without the administration of intravenous   contrast. Dose modulation, iterative reconstruction, and/or weight based   adjustment of the mA/kV was utilized to reduce the radiation dose to as low   as reasonably achievable. COMPARISON:   CT head November 27, 2020     HISTORY:   ORDERING SYSTEM PROVIDED HISTORY: fall   TECHNOLOGIST PROVIDED HISTORY:     fall   Decision Support Exception - unselect if not a suspected or confirmed   emergency medical condition->Emergency Medical Condition (MA)   Reason for Exam: 2 falls today striking posterior head and multiple falls   over the last two weeks.  Reports weakness and some dizziness   Acuity: Acute   Type of Exam: Initial     FINDINGS: BRAIN/VENTRICLES: There is no acute intracranial hemorrhage, mass effect or   midline shift. No abnormal extra-axial fluid collection.  The gray-white   differentiation is maintained without evidence of an acute infarct. There is   prominence of the ventricles and sulci due to global parenchymal volume loss. There are nonspecific areas of hypoattenuation within the periventricular and   subcortical white matter, which likely represent chronic microvascular   ischemic change. ORBITS: Bilateral lens implants. SINUSES: The visualized paranasal sinuses and mastoid air cells demonstrate   no acute abnormality.      SOFT TISSUES/SKULL: No acute abnormality of the visualized skull or soft   tissues.                       LABS:  Results for orders placed or performed during the hospital encounter of 09/27/21   CBC Auto Differential   Result Value Ref Range    WBC 9.7 3.5 - 11.3 k/uL    RBC 4.14 3.95 - 5.11 m/uL    Hemoglobin 10.0 (L) 11.9 - 15.1 g/dL    Hematocrit 34.0 (L) 36.3 - 47.1 %    MCV 82.1 (L) 82.6 - 102.9 fL    MCH 24.2 (L) 25.2 - 33.5 pg    MCHC 29.4 25.2 - 33.5 g/dL    RDW 20.6 (H) 11.8 - 14.4 %    Platelets See Reflexed IPF Result 138 - 453 k/uL    MPV NOT REPORTED 8.1 - 13.5 fL    NRBC Automated 0.0 0.0 per 100 WBC    Differential Type NOT REPORTED     WBC Morphology NOT REPORTED     RBC Morphology NOT REPORTED     Platelet Estimate NOT REPORTED     Monocytes 13 (H) 3 - 12 %    Lymphocytes 18 (L) 24 - 43 %    Seg Neutrophils 64 36 - 65 %    Eosinophils % 3 1 - 4 %    Basophils 1 0 - 2 %    Immature Granulocytes 1 (H) 0 %    Absolute Mono # 1.26 (H) 0.10 - 1.20 k/uL    Absolute Lymph # 1.75 1.10 - 3.70 k/uL    Segs Absolute 6.20 1.50 - 8.10 k/uL    Absolute Eos # 0.29 0.00 - 0.44 k/uL    Basophils Absolute 0.10 0.00 - 0.20 k/uL    Absolute Immature Granulocyte 0.10 0.00 - 0.30 k/uL    Morphology Platelet count adequate     Morphology ANISOCYTOSIS PRESENT     Morphology HYPOCHROMIA PRESENT Morphology MICROCYTOSIS PRESENT     Morphology 1+ ELLIPTOCYTES     Morphology 1+ SCHISTOCYTES    Comprehensive Metabolic Panel   Result Value Ref Range    Glucose 108 (H) 70 - 99 mg/dL    BUN 29 (H) 8 - 23 mg/dL    CREATININE 1.07 (H) 0.50 - 0.90 mg/dL    Bun/Cre Ratio 27 (H) 9 - 20    Calcium 10.1 8.6 - 10.4 mg/dL    Sodium 138 135 - 144 mmol/L    Potassium 5.0 3.7 - 5.3 mmol/L    Chloride 97 (L) 98 - 107 mmol/L    CO2 30 20 - 31 mmol/L    Anion Gap 11 9 - 17 mmol/L    Alkaline Phosphatase 89 35 - 104 U/L    ALT 27 5 - 33 U/L    AST 51 (H) <32 U/L    Total Bilirubin 0.25 (L) 0.3 - 1.2 mg/dL    Total Protein 7.4 6.4 - 8.3 g/dL    Albumin 4.3 3.5 - 5.2 g/dL    Albumin/Globulin Ratio 1.4 1.0 - 2.5    GFR Non- 48 (L) >60 mL/min    GFR  58 (L) >60 mL/min    GFR Comment          GFR Staging NOT REPORTED    Urinalysis Reflex to Culture    Specimen: Urine, clean catch   Result Value Ref Range    Color, UA NOT REPORTED Yellow    Turbidity UA NOT REPORTED Clear    Glucose, Ur NEGATIVE NEGATIVE    Bilirubin Urine NEGATIVE NEGATIVE    Ketones, Urine TRACE (A) NEGATIVE    Specific Gravity, UA 1.020 1.010 - 1.025    Urine Hgb NEGATIVE NEGATIVE    pH, UA 5.5 5.0 - 6.0    Protein, UA NEGATIVE NEGATIVE    Urobilinogen, Urine Normal Normal    Nitrite, Urine NEGATIVE NEGATIVE    Leukocyte Esterase, Urine NEGATIVE NEGATIVE    Urinalysis Comments NOT REPORTED    Microscopic Urinalysis   Result Value Ref Range    -          WBC, UA 0 TO 4 0 - 4 /HPF    RBC, UA 0 TO 4 0 - 4 /HPF    Casts UA NOT REPORTED 0 - 2 /LPF    Crystals, UA NOT REPORTED None /HPF    Epithelial Cells UA 0 TO 4 0 - 5 /HPF    Renal Epithelial, UA NOT REPORTED 0 /HPF    Bacteria, UA TRACE (A) None    Mucus, UA NOT REPORTED None    Trichomonas, UA NOT REPORTED None    Amorphous, UA NOT REPORTED None    Other Observations UA NOT REPORTED NOT REQ.     Yeast, UA NOT REPORTED None   Immature Platelet Fraction   Result Value Ref Range Platelet, Immature Fraction 6.5 1.1 - 10.3 %    Platelet, Fluorescence 392 138 - 453 k/uL         EMERGENCY DEPARTMENT COURSE:   Vitals:    Vitals:    09/27/21 1514 09/27/21 1551   BP: (!) 151/52 121/71   Pulse: 89 80   Resp: 16 16   Temp: 97.5 °F (36.4 °C)    SpO2: 94% 94%   Weight: 88 lb 9.6 oz (40.2 kg)    Height: 5' (1.524 m)      -------------------------  BP: 121/71, Temp: 97.5 °F (36.4 °C), Pulse: 80, Resp: 16      Re-evaluation Notes    The patient's labs and imaging are reassuring. She will be going home with family tonight. She will be going to nursing home tomorrow. The patient was discharged in good condition. FINAL IMPRESSION      1.  Fall, initial encounter          DISPOSITION/PLAN   DISPOSITION        Condition on Disposition    good    PATIENT REFERRED TO:  OPHELIA Oates - [address]  [phone]    In 1 week        DISCHARGE MEDICATIONS:  New Prescriptions    No medications on file       (Please note that portions of this note were completed with a voice recognition program.  Efforts were made to edit the dictations but occasionally words are mis-transcribed.)    Yossi Daniel MD,, MD   Attending Emergency Physician         sAhlyn Corado MD  09/27/21 8001

## 2021-09-27 NOTE — PROGRESS NOTES
09/27/21  Ivory Downing  3/16/1927      Chief Complaint:   1. Confusion    2. Frequent falls    3. Decreased appetite    4. 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 Daughter states she has had 4-5 falls over the last week. States mentation is significantly down from when she seen her Friday night. Patient is alert to place and name but not to year or date. Has chronic headaches. Unsure if she has hit her head. Daughter states she is pretty sure she refractured her humerus when she had a fall. She denies any significant hip pain. Daughter states since she was having more falls last week they have [facility] living. Patient felt this was just yesterday. States significant odor to her urine. Concern for dehydration and malnutrition as she is eating half a bowl of cereal and just drinking coffee. Her weight is down another 7 pounds since July. States she has intermittent nausea. Is noted that she is on Ativan. Daughter states she has been on this over 30+ years.       Allergies   Allergen Reactions    Acyclovir And Related Diarrhea and Nausea Only    Albuterol     Amitriptyline      Dizziness      Asa [Aspirin]      Stomach upset      Betadine [Povidone Iodine]     Biaxin [Clarithromycin] Nausea Only    Buspar [Buspirone]     Cefuroxime Axetil      Diarrhea      Celestone [Betamethasone]     Codeine     Darvocet A500 [Propoxyphene N-Acetaminophen]     Dicyclomine Hcl     Doxycycline Nausea Only    Esomeprazole Magnesium     Fluconazole     Lidex [Fluocinolone]     Neomycin     Pcn [Penicillins] Hives    Protonix [Pantoprazole]     Quinolones Hives    Statins [Statins]      Leg cramps      Tape [Adhesive Tape]     Tramadol      \"makes her walk into walls\"    Valium     Vicodin [Hydrocodone-Acetaminophen]     Zithromax [Azithromycin] Nausea      Hydrocodone Nausea Only       Past Medical History:   Diagnosis Date    Anxiety     Blepharochalasis     Chest pain     Chronic bronchitis (HCC)     Chronic constipation     Chronic obstructive pulmonary disease (HCC)     Closed fracture of proximal end of right humerus with routine healing 2/20/2018    Coronary artery disease     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  GERD (gastroesophageal reflux disease)     Hyperlipidemia     Hypothyroidism     Intestinal adhesions     Iron deficiency anemia     Microalbuminuria     Migraines     Non-ST elevation myocardial infarction (NSTEMI) ([facility])     anteroseptal    Osteoarthritis     Osteoporosis     Palpitations     Pseudophakos     Bilateral.    Radiation exposure age 2    nasopharyngeal radiation    Scoliosis     Tobacco abuse        Past Surgical History:   Procedure Laterality Date    ABDOMINAL EXPLORATION SURGERY  1950    with lysis of adhesions    APPENDECTOMY  1937    CATARACT REMOVAL WITH IMPLANT Right 04/25/2000    CATARACT REMOVAL WITH IMPLANT Left 07/18/2000    CHOLECYSTECTOMY, OPEN  1982    COLONOSCOPY  01/17/2011    Good anastomosis from previous diverticular resection, residual diverticulosis.  COLONOSCOPY  09/29/2008    With EGD showing mild gastritis, moderate sigmiod diverticulosis, internal hemorrhoids.  COLONOSCOPY  10/24/2001    With EGD showing mild antral gastritis, mild erythema of the cecum, small internal hemorrhoid. [facility]    [place] treatment to both ears.  OTHER SURGICAL HISTORY Right 06/06/2000    Laser treatment of \"secondary cataract\".     OTHER SURGICAL HISTORY  10/07/2008    capsule endoscopy    PARTIAL HYSTERECTOMY  1955    Left ovary, tube and uterus removed.  SALPINGO-OOPHORECTOMY  1951    Right ovary and tube removed.  SIGMOIDECTOMY      laparoscopic    SMALL INTESTINE SURGERY  12/05/2008    And lysis of adhesions for diverticular disease. Diana Evans    UPPER GASTROINTESTINAL ENDOSCOPY  01/17/2011    Mild gastritis.  UPPER GASTROINTESTINAL ENDOSCOPY  10/22/2012    Irritation at the end of the esophagus.  UPPER GASTROINTESTINAL ENDOSCOPY  11/15/2004    Normal.       Current Outpatient Medications on File Prior to Visit   Medication Sig Dispense Refill    LORazepam (ATIVAN) 1 MG tablet TAKE ONE TABLET BY MOUTH EVERY 6 HOURS AS NEEDED FOR ANXIETY FOR UP TO 30 DAYS 120 tablet 0    Cholecalciferol (VITAMIN D3) 50 MCG (2000 UT) CAPS Take 2,000 Units by mouth daily      famotidine (PEPCID) 20 MG tablet Take 1 tablet by mouth daily 60 tablet 3    hydroCHLOROthiazide (HYDRODIURIL) 25 MG tablet Take 1 tablet by mouth every morning 90 tablet 1    Handicap Placard MISC by Does not apply route . Expires 03/16/2024 1 each 0    nitroGLYCERIN (NITROSTAT) 0.4 MG SL tablet Take one tablet under the tongue as needed for chest pain. May repeat every 5 minutes for up to 3 doses. If no improvement, go to ER 25 tablet 3    mirtazapine (REMERON) 15 MG tablet Take 0.5 tablets by mouth nightly 30 tablet 3    Misc.  Devices MISC Nebulizer machine Dx J44.19 1 Device 0    albuterol (PROVENTIL) (2.5 MG/3ML) 0.083% nebulizer solution Take 3 mLs by nebulization every 6 hours as needed for Wheezing 120 each 3    ferrous gluconate (FERGON) 324 (38 Fe) MG tablet Take 324 mg by mouth daily (with breakfast)       albuterol sulfate HFA (PROVENTIL HFA) 108 (90 Base) MCG/ACT inhaler Inhale 2 puffs into the lungs every 6 hours as needed for Wheezing or Shortness of Breath 1 Inhaler 3    Fiber POWD Take by mouth nightly  Multiple Vitamins-Minerals (I-DANIA) TABS Take 1 tablet by mouth 2 times daily      Propylene Glycol (SYSTANE BALANCE OP) Apply 1 drop to eye 3 times daily       acetaminophen (TYLENOL) 500 MG tablet Take 500 mg by mouth every 6 hours as needed for Pain.  Probiotic Product (PROBIOTIC DAILY PO) Take 1 tablet by mouth daily TruBiotics      Multiple Vitamins-Minerals (MULTIVITAMIN & MINERAL PO) Take 1 tablet by mouth daily.  docusate sodium (COLACE) 100 MG capsule Take 100 mg by mouth 2 times daily       ascorbic acid (VITAMIN C) 500 MG tablet Take 1,000 mg by mouth daily.  Biotin 5000 MCG CAPS Take 2 tablets by mouth daily        No current facility-administered medications on file prior to visit. Social History     Socioeconomic History    Marital status:      Spouse name: Not on file    Number of children: Not on file    Years of education: Not on file    Highest education level: Not on file   Occupational History    Not on file   Tobacco Use    Smoking status: Current Every Day Smoker     Packs/day: 1.00     Years: 68.00     Pack years: 68.00     Types: Cigarettes    Smokeless tobacco: Never Used   Vaping Use    Vaping Use: Never used   Substance and Sexual Activity    Alcohol use: No    Drug use: No    Sexual activity: Not on file   Other Topics Concern    Not on file   Social History Narrative    Not on file     Social Determinants of Health     Financial Resource Strain:     Difficulty of Paying Living Expenses:    Food Insecurity:     Worried About Running Out of Food in the Last Year:     [address] in the Last Year:    Transportation Needs:     Lack of Transportation (Medical):      Lack of Transportation (Non-Medical):    Physical Activity:     Days of Exercise per Week:     Minutes of Exercise per Session:    Stress:     Feeling of Stress :    Social Connections:     Frequency of Communication with Friends and Family:     Frequency of Social Gatherings with Friends and Family:     Attends Zoroastrianism Services:     Active Member of Clubs or Organizations:     Attends Club or Organization Meetings:     Marital Status:    Intimate Partner Violence:     Fear of Current or Ex-Partner:     Emotionally Abused:     Physically Abused:     Sexually Abused:        Review of Systems   Constitutional: Positive for activity change, appetite change, fatigue and unexpected weight change. Negative for diaphoresis and fever. HENT: Negative. Respiratory: Positive for shortness of breath (Chronic shortness of breath). Gastrointestinal: Negative. Endocrine: Negative. Genitourinary: Positive for decreased urine volume and urgency. Negative for difficulty urinating, dysuria, flank pain, frequency, hematuria and pelvic pain. Musculoskeletal: Positive for arthralgias, back pain, gait problem and myalgias. Neurological: Positive for tremors, weakness and headaches. Negative for dizziness, seizures and syncope. Physical Exam  Vitals and nursing note reviewed. Constitutional:       General: She is not in acute distress. Appearance: She is underweight. She is ill-appearing. She is not diaphoretic. HENT:      Head: Normocephalic and atraumatic. Right Ear: External ear normal.      Left Ear: External ear normal.   Eyes:      General:         Right eye: No discharge. Left eye: No discharge. Neck:      Trachea: No tracheal deviation. Cardiovascular:      Rate and Rhythm: Normal rate and regular rhythm. Pulses: Normal pulses. Heart sounds: Normal heart sounds. No murmur heard. No friction rub. No gallop. Pulmonary:      Effort: Pulmonary effort is normal. No respiratory distress. Breath sounds: Normal breath sounds. No stridor. No wheezing, rhonchi or rales. Comments: Prolonged expiratory phase  Chest:      Chest wall: No tenderness. Abdominal:      General: Bowel sounds are normal. There is no distension. Palpations: Abdomen is soft. Tenderness: There is no abdominal tenderness. Musculoskeletal:         General: No swelling. Right lower leg: No edema. Left lower leg: No edema. Skin:     General: Skin is warm and dry. Capillary Refill: Capillary refill takes less than 2 seconds. Coloration: Skin is pale. Findings: No rash. Neurological:      Mental Status: She is alert. Cranial Nerves: No cranial nerve deficit. Sensory: No sensory deficit. Motor: Weakness present. Coordination: Coordination normal.      Gait: Gait abnormal.      Comments: Alert to name and place however not to year month or date   Psychiatric:         Mood and Affect: Mood normal.         Behavior: Behavior normal.       Vitals:    09/27/21 1406   BP: 110/62   Site: Left Upper Arm   Position: Sitting   Cuff Size: Small Adult   Pulse: 76   Temp: 97.3 °F (36.3 °C)   TempSrc: Temporal   Weight: 88 lb 9.6 oz (40.2 kg)   Height: 5' (1.524 m)       Assessment:  1. Confusion  2. Frequent falls  3. Decreased appetite  4. 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 Lab work, UA, possible CT of head due to falls unknown head trauma plan will be to get her to [facility] assisted living after discharge from either ER or hospital.  Daughter was in agreement for ER work-up. Patient was sent to ER by wheelchair with the nurse.   Attempted to provide report to nurse    Electronically signed by OPHELIA Barrientos CNP on 9/27/2021 at 2:30 PM

## 2021-09-27 NOTE — ED TRIAGE NOTES
Pt to ER from PCP. Pt has had 2 falls today and multiple falls over the last two weeks.  Reports weakness and some dizziness

## 2021-10-04 NOTE — TELEPHONE ENCOUNTER
Daughter, Jorden Rene, called. When mother was at home she was taking Tylenol and Ativan every 4 hours. Since in GP she gets the ativan every 6hrs    Daughter would like to knw if order can be changed back to every 4?   If so please send that order and also call to let her know  [phone]

## 2021-10-04 NOTE — TELEPHONE ENCOUNTER
Per EMR from visit 07/13/21, patient was taking ativan 1 mg Q6PRN, not every 4 hours. EMR notes she is currently taking tylenol 500 mg Q6HPRN for pain - this could be increased to [facility] if needed.

## 2021-10-04 NOTE — TELEPHONE ENCOUNTER
Spoke with daughter- she says the previous order was Lorazepam 1 mg one tablet every 6 hours, but she was giving her \"1/2 tablet every 4 hours as needed. Now the order is 1/2 tablet every 6 hours, which is cutting her dose\". Daughter would like order to state Lorazepam 1 mg 1/2 tablet every 4 hours prn. She has had a lot of anxiety since she moved into [facility].

## 2021-10-04 NOTE — TELEPHONE ENCOUNTER
Daughter (Shi President) notified per detailed message. [facility] notified of situation. Catalino Talamantes to address at facility tomorrow.

## 2021-10-05 NOTE — PROGRESS NOTES
[facility]      Dev Suazo is a 80 y.o. female resident of [facility] who presents today for medical conditions/complaints as noted below. HPI:     HPI   Patient presents for evaluation and management of chronic medical conditions as noted below. Patient recently admitted to [facility] after weakness and multiple falls, was evaluated in ER. Did strike the back of her head and her back in one of the falls. CT head unremarkable. Diabetes has been stable, previous A1C 5.7. Has been working on increasing caloric intake given she is borderline underweight    Not currently on medication for dyslipidemia to minimize polypharmacy. Previous evaluated by cardiology for CHF and CAD. She denies any chest pain or dyspnea. Edema and BP have been well controlled with HCTZ. Hypothyroidism stable without medication, most recent TSH within normal limits. COPD stable with PRN albuterol. Continues to smoke AMA. Continues on iron supplements for anemia. Most recent Hb 10.0    Depression and anxiety are well controlled on remoeron, sertraline and Ativan.  As previously noted patient has been stable on Ativan for nearly 3 decades and previously reviewed risks and benefits of continuing long-term benzodiazepines in elderly with both patient and daughter. Patient does note that at home, she was taking lorazepam 0.5 mg Q4HPRN. Prescription at previous clinic visit was lorazepam 1 mg Q6HPRN. Will plan to update this rx to lorazepam 0.5 mg Q4HPRN. Arthritis stable, ambulatory with use of rolling waler    Continues on supplements for osteoporosis. GERD improved with pepcid. Current Outpatient Medications   Medication Sig Dispense Refill    LORazepam (ATIVAN) 1 MG tablet Take 0.5 tablets by mouth every 4 hours as needed for Anxiety for up to 30 days.  90 tablet 0    Cholecalciferol (VITAMIN D3) 50 MCG (2000 UT) CAPS Take 2,000 Units by mouth daily      famotidine (PEPCID) 20 MG tablet Take 1 tablet by mouth daily 60 tablet 3    hydroCHLOROthiazide (HYDRODIURIL) 25 MG tablet Take 1 tablet by mouth every morning 90 tablet 1    nitroGLYCERIN (NITROSTAT) 0.4 MG SL tablet Take one tablet under the tongue as needed for chest pain. May repeat every 5 minutes for up to 3 doses. If no improvement, go to ER 25 tablet 3    mirtazapine (REMERON) 15 MG tablet Take 0.5 tablets by mouth nightly 30 tablet 3    albuterol (PROVENTIL) (2.5 MG/3ML) 0.083% nebulizer solution Take 3 mLs by nebulization every 6 hours as needed for Wheezing 120 each 3    ferrous gluconate (FERGON) 324 (38 Fe) MG tablet Take 324 mg by mouth daily (with breakfast)       albuterol sulfate HFA (PROVENTIL HFA) 108 (90 Base) MCG/ACT inhaler Inhale 2 puffs into the lungs every 6 hours as needed for Wheezing or Shortness of Breath 1 Inhaler 3    Fiber POWD Take by mouth nightly      Multiple Vitamins-Minerals (I-DANIA) TABS Take 1 tablet by mouth 2 times daily      Propylene Glycol (SYSTANE BALANCE OP) Apply 1 drop to eye 3 times daily       acetaminophen (TYLENOL) 500 MG tablet Take 500 mg by mouth every 4 hours as needed for Pain       Probiotic Product (PROBIOTIC DAILY PO) Take 1 tablet by mouth daily TruBiotics      Multiple Vitamins-Minerals (MULTIVITAMIN & MINERAL PO) Take 1 tablet by mouth daily.  docusate sodium (COLACE) 100 MG capsule Take 100 mg by mouth 2 times daily       ascorbic acid (VITAMIN C) 500 MG tablet Take 1,000 mg by mouth daily.  Biotin 5000 MCG CAPS Take 2 tablets by mouth daily        No current facility-administered medications for this visit.      Allergies   Allergen Reactions    Acyclovir And Related Diarrhea and Nausea Only    Albuterol     Amitriptyline      Dizziness      Asa [Aspirin]      Stomach upset      Betadine [Povidone Iodine]     Biaxin [Clarithromycin] Nausea Only    Buspar [Buspirone]     Cefuroxime Axetil      Diarrhea      Celestone [Betamethasone]     Codeine     Darvocet A500 [Propoxyphene N-Acetaminophen]     Dicyclomine Hcl     Doxycycline Nausea Only    Esomeprazole Magnesium     Fluconazole     Lidex [Fluocinolone]     Neomycin     Pcn [Penicillins] Hives    Protonix [Pantoprazole]     Quinolones Hives    Statins [Statins]      Leg cramps      Tape [Adhesive Tape]     Tramadol      \"makes her walk into walls\"    Valium     Vicodin [Hydrocodone-Acetaminophen]     Zithromax [Azithromycin]      Nausea      Hydrocodone Nausea Only       Health Maintenance   Topic Date Due    Shingles Vaccine (1 of 2) Never done    Flu vaccine (1) 09/01/2021    DTaP/Tdap/Td vaccine (2 - Tdap) 01/04/2037 (Originally 3/23/2008)    TSH testing  12/15/2021    Annual Wellness Visit (AWV)  03/17/2022    Potassium monitoring  09/27/2022    Creatinine monitoring  09/27/2022    Pneumococcal 65+ years Vaccine  Completed    COVID-19 Vaccine  Completed    Hepatitis A vaccine  Aged Out    Hib vaccine  Aged Out    Meningococcal (ACWY) vaccine  Aged Out       Subjective:      Review of Systems   Constitutional: Negative for chills and fever. HENT: Negative for congestion and sore throat. Respiratory: Negative for chest tightness and shortness of breath. Cardiovascular: Negative for chest pain and leg swelling. Gastrointestinal: Negative for diarrhea, nausea and vomiting. Genitourinary: Negative for difficulty urinating and dysuria. Musculoskeletal: Negative for gait problem and myalgias. Neurological: Negative for dizziness and headaches. Psychiatric/Behavioral: Negative for confusion and decreased concentration. Objective:     Vitals:    10/05/21 0909   BP: 125/82   Pulse: 93   Temp: 97.5 °F (36.4 °C)   SpO2: 90%     Physical Exam  Vitals and nursing note reviewed. Constitutional:       General: She is not in acute distress. Appearance: She is well-developed. HENT:      Head: Normocephalic and atraumatic.       Right Ear: External ear normal. Left Ear: External ear normal.   Eyes:      General: Lids are normal.      Extraocular Movements: Extraocular movements intact. Conjunctiva/sclera: Conjunctivae normal.   Neck:      Thyroid: No thyromegaly. Cardiovascular:      Rate and Rhythm: Normal rate and regular rhythm. Heart sounds: No murmur heard. Pulmonary:      Effort: Pulmonary effort is normal. No accessory muscle usage or respiratory distress. Breath sounds: Normal breath sounds. No wheezing, rhonchi or rales. Abdominal:      Palpations: Abdomen is soft. Tenderness: There is no abdominal tenderness. There is no guarding or rebound. Musculoskeletal:         General: Normal range of motion. Cervical back: Neck supple. Right lower leg: No edema. Left lower leg: No edema. Lymphadenopathy:      Cervical: No cervical adenopathy. Skin:     General: Skin is warm and dry. Nails: There is no clubbing. Neurological:      Mental Status: She is alert. Coordination: Coordination normal.   Psychiatric:         Mood and Affect: Mood normal.         Speech: Speech normal.         Behavior: Behavior normal.         Assessment/Plan:       1. Controlled type 2 diabetes mellitus without complication, without long-term current use of insulin ([address]  stable  - Continue to monitor    2. Mixed hyperlipidemia, stable  - No longer on medication to minimize polypharmacy    3. Coronary artery disease involving native coronary artery of native heart, unspecified whether angina present, stable  4. Diastolic heart failure, stable  5. Bilateral swelling of feet, stable  - Continue to follow with cardiology    6. Other specified hypothyroidism,  stable  - Continue to monito    7. Other emphysema ([address]  stable  - Continue to monitor    8. Iron deficiency anemia secondary to inadequate dietary iron intake, stable  - Continue supplement    9. Major depressive disorder with single episode, in partial remission ([address] stable  10. Anxiety, stable  - Change lorazepam to 0.5 mg Q4HPRN as noted above  - LORazepam (ATIVAN) 1 MG tablet; Take 0.5 tablets by mouth every 4 hours as needed for Anxiety for up to 30 days. Dispense: 90 tablet; Refill: 0    11. Primary osteoarthritis involving multiple joints,  stable  - Continue to monitor    12. Osteoporosis, unspecified osteoporosis type, unspecified pathological fracture presence, stable  - Continue supplement    13. Gastroesophageal reflux disease without esophagitis, improved  - Continue pepcid        Return in about 3 months (around 1/5/2022). No orders of the defined types were placed in this encounter. Orders Placed This Encounter   Medications    LORazepam (ATIVAN) 1 MG tablet     Sig: Take 0.5 tablets by mouth every 4 hours as needed for Anxiety for up to 30 days.      Dispense:  90 tablet     Refill:  0         Electronically signed by OPHELIA Mayes CNP on 10/6/2021 at 9:15 AM

## 2021-10-05 NOTE — TELEPHONE ENCOUNTER
Nicole Line evaluated pt today- Ativan changed to 1 mg 1/2 tablet q4h prn. Daughter (Ashish Hinson) notified per phone call.

## 2021-10-06 PROBLEM — I50.30 DIASTOLIC HEART FAILURE (HCC): Status: ACTIVE | Noted: 2021-01-01

## 2021-10-25 NOTE — TELEPHONE ENCOUNTER
Thank you, I actually saw her 10/05/21 for a follow up, so will plan to see her again in January.  Please make sure family is aware I will plan to follow up in case they would like to be present at time of visit

## 2021-10-25 NOTE — TELEPHONE ENCOUNTER
FYI   You need to see patient at [facility] around Nov 16 for a 4 month follow up

## 2021-11-02 NOTE — PROGRESS NOTES
[facility]      Rosamaria Hayes is a 80 y.o. female resident of [facility] who presents today for medical conditions/complaints as noted below. HPI:     HPI     Patient presents for evaluation and management of upper respiratory symptoms. Staff has noted some congestion and overall decreased appetite and patient. Oxygen saturation has been as low as 89%, currently 93% on room air. Patient is a longtime smoker. COPD is generally well controlled without maintenance medications. She does have PRN order for albuterol. Does have a slight cough. Staff notes that she tested negative for Covid last week. Patient denies wheezing or dyspnea. Current Outpatient Medications   Medication Sig Dispense Refill    doxycycline hyclate (VIBRA-TABS) 100 MG tablet Take 1 tablet by mouth 2 times daily for 7 days 14 tablet 0    LORazepam (ATIVAN) 1 MG tablet Take 0.5 tablets by mouth every 4 hours as needed for Anxiety for up to 30 days. 90 tablet 0    Cholecalciferol (VITAMIN D3) 50 MCG (2000 UT) CAPS Take 2,000 Units by mouth daily      famotidine (PEPCID) 20 MG tablet Take 1 tablet by mouth daily 60 tablet 3    hydroCHLOROthiazide (HYDRODIURIL) 25 MG tablet Take 1 tablet by mouth every morning 90 tablet 1    nitroGLYCERIN (NITROSTAT) 0.4 MG SL tablet Take one tablet under the tongue as needed for chest pain. May repeat every 5 minutes for up to 3 doses.  If no improvement, go to ER 25 tablet 3    mirtazapine (REMERON) 15 MG tablet Take 0.5 tablets by mouth nightly 30 tablet 3    albuterol (PROVENTIL) (2.5 MG/3ML) 0.083% nebulizer solution Take 3 mLs by nebulization every 6 hours as needed for Wheezing 120 each 3    ferrous gluconate (FERGON) 324 (38 Fe) MG tablet Take 324 mg by mouth daily (with breakfast)       albuterol sulfate HFA (PROVENTIL HFA) 108 (90 Base) MCG/ACT inhaler Inhale 2 puffs into the lungs every 6 hours as needed for Wheezing or Shortness of Breath 1 Inhaler 3    Fiber POWD (Orange 538 gm-S): Mix 1 tbsp and take by mouth at bedtime      Multiple Vitamins-Minerals (I-DANIA) TABS Take 1 tablet by mouth 2 times daily      Propylene Glycol (SYSTANE BALANCE OP) Apply 1 drop to eye 3 times daily       acetaminophen (TYLENOL) 500 MG tablet Take 500 mg by mouth every 4 hours as needed for Pain       Probiotic Product (PROBIOTIC DAILY PO) Take 1 tablet by mouth daily TruBiotics      Multiple Vitamins-Minerals (MULTIVITAMIN & MINERAL PO) Take 1 tablet by mouth daily.  docusate sodium (COLACE) 100 MG capsule Take 100 mg by mouth 2 times daily       ascorbic acid (VITAMIN C) 500 MG tablet Take 1,000 mg by mouth daily.  Biotin 5000 MCG CAPS Take 2 tablets by mouth daily        No current facility-administered medications for this visit.      Allergies   Allergen Reactions    Acyclovir And Related Diarrhea and Nausea Only    Albuterol     Amitriptyline      Dizziness      Asa [Aspirin]      Stomach upset      Betadine [Povidone Iodine]     Biaxin [Clarithromycin] Nausea Only    Buspar [Buspirone]     Cefuroxime Axetil      Diarrhea      Celestone [Betamethasone]     Codeine     Darvocet A500 [Propoxyphene N-Acetaminophen]     Dicyclomine Hcl     Doxycycline Nausea Only    Esomeprazole Magnesium     Fluconazole     Lidex [Fluocinolone]     Neomycin     Pcn [Penicillins] Hives    Protonix [Pantoprazole]     Quinolones Hives    Statins [Statins]      Leg cramps      Tape [Adhesive Tape]     Tramadol      \"makes her walk into walls\"    Valium     Vicodin [Hydrocodone-Acetaminophen]     Zithromax [Azithromycin]      Nausea      Hydrocodone Nausea Only       Health Maintenance   Topic Date Due    Shingles Vaccine (1 of 2) Never done    Flu vaccine (1) 09/01/2021    DTaP/Tdap/Td vaccine (2 - Tdap) 01/04/2037 (Originally 3/23/2008)    TSH testing  12/15/2021    Annual Wellness Visit (AWV)  03/17/2022    Potassium monitoring  09/27/2022    Creatinine monitoring  09/27/2022    Pneumococcal 65+ years Vaccine  Completed    COVID-19 Vaccine  Completed    Hepatitis A vaccine  Aged Out    Hib vaccine  Aged Out    Meningococcal (ACWY) vaccine  Aged Out       Subjective:      Review of Systems   Constitutional: Positive for appetite change. Negative for chills and fever. HENT: Positive for congestion. Negative for rhinorrhea. Respiratory: Positive for cough. Negative for shortness of breath and wheezing. Cardiovascular: Negative for chest pain and leg swelling. Gastrointestinal: Negative for diarrhea, nausea and vomiting. Neurological: Negative for dizziness and weakness. Objective:     Vitals:    11/02/21 1014   BP: 124/62   Pulse: 69   Resp: 18   Temp: 97.6 °F (36.4 °C)   SpO2: 93%     Physical Exam  Constitutional:       General: She is not in acute distress. HENT:      Head: Normocephalic and atraumatic. Right Ear: External ear normal.      Left Ear: External ear normal.   Eyes:      Extraocular Movements: Extraocular movements intact. Conjunctiva/sclera: Conjunctivae normal.   Cardiovascular:      Rate and Rhythm: Normal rate and regular rhythm. Pulmonary:      Effort: No respiratory distress. Comments: Slight rhonchi bilaterally, no wheezing  Neurological:      General: No focal deficit present. Mental Status: She is alert. Mental status is at baseline. Psychiatric:         Mood and Affect: Mood normal.         Behavior: Behavior normal.         Assessment/Plan:        1. COPD with acute exacerbation ([facility] [address]  - CXR, rapid COVID test. Patient has multiple allergies to ATB, has mild reaction to doxycycline, will start and monitor closely for improvement      Return if symptoms worsen or fail to improve. No orders of the defined types were placed in this encounter.     Orders Placed This Encounter   Medications    doxycycline hyclate (VIBRA-TABS) 100 MG tablet     Sig: Take 1 tablet by mouth 2 times daily for 7 days Dispense:  14 tablet     Refill:  0               Electronically signed by OPHELIA Luna CNP on 11/2/2021 at 12:53 PM

## 2021-12-02 NOTE — TELEPHONE ENCOUNTER
----- Message from German Bro sent at 12/2/2021 12:46 PM EST -----  Subject: Message to Provider    QUESTIONS  Information for Provider? [facility] is requesting pts   office note from 11/23/21. Attn? Maynor Acharya Fax# [phone]   ---------------------------------------------------------------------------  --------------  [facility]  What is the best way for the office to contact you? OK to leave message on   voicemail  Preferred Call Back Phone Number? [phone]  ---------------------------------------------------------------------------  --------------  SCRIPT ANSWERS  Relationship to Patient? Third Party  Representative Name?  Cathy Mendez

## 2021-12-02 NOTE — TELEPHONE ENCOUNTER
Pt was not seen 11/23/21. Spoke with Shoaib Miranda has already left for the day, but today's fax was for PT/OT. Pt will need a Face-to-Face completed. Please see pt at GP on Tuesday.

## 2021-12-07 NOTE — PROGRESS NOTES
[facility]      Esperanza Stiles is a 80 y.o. female resident of [facility] who presents today for medical conditions/complaints as noted below. HPI:     HPI     Patient presents for face to face visit for physical therapy. She has had multiple falls recently. She does have a history of osteoarthritis and osteoporosis. No recent fracture. She is ambulatory with the use of a walker around the facility, but has been having some gait instability. Otherwise she is doing well. Denies fever, chills, nausea, vomiting, or diarrhea. Current Outpatient Medications   Medication Sig Dispense Refill    LORazepam (ATIVAN) 0.5 MG tablet Take 0.5 mg by mouth every 4 hours as needed for Anxiety.  hydroCHLOROthiazide (HYDRODIURIL) 25 MG tablet TAKE ONE TABLET BY MOUTH EVERY MORNING 90 tablet 3    Cholecalciferol (VITAMIN D3) 50 MCG (2000 UT) CAPS Take 2,000 Units by mouth daily      famotidine (PEPCID) 20 MG tablet Take 1 tablet by mouth daily 60 tablet 3    nitroGLYCERIN (NITROSTAT) 0.4 MG SL tablet Take one tablet under the tongue as needed for chest pain. May repeat every 5 minutes for up to 3 doses.  If no improvement, go to ER 25 tablet 3    mirtazapine (REMERON) 15 MG tablet Take 0.5 tablets by mouth nightly 30 tablet 3    albuterol (PROVENTIL) (2.5 MG/3ML) 0.083% nebulizer solution Take 3 mLs by nebulization every 6 hours as needed for Wheezing 120 each 3    ferrous gluconate (FERGON) 324 (38 Fe) MG tablet Take 324 mg by mouth daily (with breakfast)       albuterol sulfate HFA (PROVENTIL HFA) 108 (90 Base) MCG/ACT inhaler Inhale 2 puffs into the lungs every 6 hours as needed for Wheezing or Shortness of Breath 1 Inhaler 3    Fiber POWD (Orange 538 gm-S): Mix 1 tbsp and take by mouth at bedtime      Multiple Vitamins-Minerals (I-DANIA) TABS Take 1 tablet by mouth 2 times daily      Propylene Glycol (SYSTANE BALANCE OP) Apply 1 drop to eye 3 times daily       acetaminophen (TYLENOL) 500 MG tablet Take 500 mg by mouth every 4 hours as needed for Pain       Probiotic Product (PROBIOTIC DAILY PO) Take 1 tablet by mouth daily TruBiotics      Multiple Vitamins-Minerals (MULTIVITAMIN & MINERAL PO) Take 1 tablet by mouth daily.  docusate sodium (COLACE) 100 MG capsule Take 100 mg by mouth 2 times daily       ascorbic acid (VITAMIN C) 500 MG tablet Take 1,000 mg by mouth daily.  Biotin 5000 MCG CAPS Take 2 tablets by mouth daily        No current facility-administered medications for this visit.      Allergies   Allergen Reactions    Acyclovir And Related Diarrhea and Nausea Only    Albuterol     Amitriptyline      Dizziness      Asa [Aspirin]      Stomach upset      Betadine [Povidone Iodine]     Biaxin [Clarithromycin] Nausea Only    Buspar [Buspirone]     Cefuroxime Axetil      Diarrhea      Celestone [Betamethasone]     Codeine     Darvocet A500 [Propoxyphene N-Acetaminophen]     Dicyclomine Hcl     Doxycycline Nausea Only    Esomeprazole Magnesium     Fluconazole     Lidex [Fluocinolone]     Neomycin     Pcn [Penicillins] Hives    Protonix [Pantoprazole]     Quinolones Hives    Statins [Statins]      Leg cramps      Tape [Adhesive Tape]     Tramadol      \"makes her walk into walls\"    Valium     Vicodin [Hydrocodone-Acetaminophen]     Zithromax [Azithromycin]      Nausea      Hydrocodone Nausea Only       Health Maintenance   Topic Date Due    Shingles Vaccine (1 of 2) Never done    COVID-19 Vaccine (3 - Booster for Moderna series) 08/17/2021    Flu vaccine (1) 09/01/2021    TSH testing  12/15/2021    DTaP/Tdap/Td vaccine (2 - Tdap) 01/04/2037 (Originally 3/23/2008)    Annual Wellness Visit (AWV)  03/17/2022    Potassium monitoring  09/27/2022    Creatinine monitoring  09/27/2022    Pneumococcal 65+ years Vaccine  Completed    Hepatitis A vaccine  Aged Out    Hib vaccine  Aged Out    Meningococcal (ACWY) vaccine  Aged Out       Subjective: Review of Systems   Constitutional: Negative for chills and fever. HENT: Negative for congestion and rhinorrhea. Respiratory: Negative for cough and wheezing. Cardiovascular: Negative for chest pain and leg swelling. Gastrointestinal: Negative for diarrhea, nausea and vomiting. Neurological: Negative for dizziness and weakness. Due to patient's clinical status, ROS of symptoms was completed by discussion with long term care facility staff, as well as with input from patient. Objective:     Vitals:    12/07/21 0826   BP: 132/80   Pulse: 85   Resp: 20   Temp: 98.4 °F (36.9 °C)   SpO2: 91%     Physical Exam  Constitutional:       General: She is not in acute distress. HENT:      Head: Normocephalic and atraumatic. Right Ear: External ear normal.      Left Ear: External ear normal.   Eyes:      Extraocular Movements: Extraocular movements intact. Conjunctiva/sclera: Conjunctivae normal.   Cardiovascular:      Rate and Rhythm: Normal rate and regular rhythm. Pulmonary:      Effort: Pulmonary effort is normal. No respiratory distress. Neurological:      General: No focal deficit present. Mental Status: She is alert. Mental status is at baseline. Psychiatric:         Mood and Affect: Mood normal.         Behavior: Behavior normal.         Assessment/Plan:        1. Multiple falls  2. Gait instability  3. Primary osteoarthritis involving multiple joints  4. Osteoporosis, unspecified osteoporosis type, unspecified pathological fracture presence  - PT evaluate and treat        Return if symptoms worsen or fail to improve. No orders of the defined types were placed in this encounter. No orders of the defined types were placed in this encounter.               Electronically signed by OPHELIA Brown CNP on 12/8/2021 at 8:31 AM

## 2021-12-08 NOTE — TELEPHONE ENCOUNTER
Please fax completed progress note to [facility] for [employer] documentation, thanks

## 2021-12-09 NOTE — TELEPHONE ENCOUNTER
Patient was seen by Dr Radha Camara today in cardiology. Dr Radha Camara recommended for patient to be put on bronchodilators due to shortness of breath. Dr Radha Camara also recommended for patient to use Robitussin if needed. Patient currently at [facility].      Last Appt:  12/9/2021  Next Appt:   Visit date not found  Med verified in [facility] [address]

## 2021-12-09 NOTE — PROGRESS NOTES
Cardiology Consultation/Follow Up. [address]  3/16/1927  [id number]    CC: Patient is here for follow up     HPI:  Augusto Martin  is here for routine follow up. She is fairly stable from cardiac standpoint. Residing in assisted living facility, accompanied by her daughter who is a RN. Mostly on wheel chair and walks only short distances. 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     GERD (gastroesophageal reflux disease)     Hyperlipidemia     Hypothyroidism     Intestinal adhesions     Iron deficiency anemia     Microalbuminuria     Migraines     Non-ST elevation myocardial infarction (NSTEMI) ([facility])     anteroseptal    Osteoarthritis     Osteoporosis     Palpitations     Pseudophakos     Bilateral.    Radiation exposure age 2    nasopharyngeal radiation    Scoliosis     Tobacco abuse        Past Surgical:  Past Surgical History:   Procedure Laterality Date    ABDOMINAL EXPLORATION SURGERY  1950    with lysis of adhesions    APPENDECTOMY  1937    CATARACT REMOVAL WITH IMPLANT Right 04/25/2000    CATARACT REMOVAL WITH IMPLANT Left 07/18/2000    CHOLECYSTECTOMY, OPEN  1982    COLONOSCOPY  01/17/2011    Good anastomosis from previous diverticular resection, residual diverticulosis.  COLONOSCOPY  09/29/2008    With EGD showing mild gastritis, moderate sigmiod diverticulosis, internal hemorrhoids.  COLONOSCOPY  10/24/2001    With EGD showing mild antral gastritis, mild erythema of the cecum, small internal hemorrhoid. [address]    Noxon treatment to both ears.  OTHER SURGICAL HISTORY Right 06/06/2000    Laser treatment of \"secondary cataract\".  OTHER SURGICAL HISTORY  10/07/2008    capsule endoscopy    PARTIAL HYSTERECTOMY  1955    Left ovary, tube and uterus removed.  SALPINGO-OOPHORECTOMY  1951    Right ovary and tube removed.  SIGMOIDECTOMY      laparoscopic    SMALL INTESTINE SURGERY  12/05/2008    And lysis of adhesions for diverticular disease. Brittany Gee Angry    UPPER GASTROINTESTINAL ENDOSCOPY  01/17/2011    Mild gastritis.  UPPER GASTROINTESTINAL ENDOSCOPY  10/22/2012    Irritation at the end of the esophagus.  UPPER GASTROINTESTINAL ENDOSCOPY  11/15/2004    Normal.       Family History:  Family History   Problem Relation Age of Onset    Diabetes Mother     Heart Disease Mother     Stroke Mother     Kidney Disease Mother     Other Mother         Bowel problems.  Alcohol Abuse Father     Heart Disease Other     Stroke Other     Diabetes Other     Mult Sclerosis Other     Diabetes Other     High Blood Pressure Other     High Cholesterol Other     Glaucoma Neg Hx     Cataracts Neg Hx        Social History:  Social History     Tobacco Use    Smoking status: Current Every Day Smoker     Packs/day: 1.00     Years: 68.00     Pack years: 68.00     Types: Cigarettes    Smokeless tobacco: Never Used   Vaping Use    Vaping Use: Never used   Substance Use Topics    Alcohol use: No    Drug use:  No REVIEW OF SYSTEMS:    · Constitutional: there has been mild decline in energy level, No change in activity level. · Eyes: No visual changes or diplopia. No scleral icterus. · ENT: No Headaches, +ve for hearing loss, -ve for vertigo. No mouth sores or sore throat. · Cardiovascular: AS HPI  · Respiratory: AS HPI  · Gastrointestinal: No abdominal pain, appetite loss, blood in stools. No change in bowel or bladder habits. · Genitourinary: No dysuria, trouble voiding, or hematuria. · Musculoskeletal:  No gait disturbance, No weakness or joint complaints. · Integumentary: No rash or pruritis. · Neurological: No headache, diplopia, change in muscle strength, numbness or tingling. No change in gait, balance, coordination, mood, affect, memory, mentation, behavior. · Psychiatric: No new anxiety or depression. · Endocrine: No temperature intolerance. No excessive thirst, fluid intake, or urination. No tremor. · Hematologic/Lymphatic: No abnormal bruising or bleeding, blood clots or swollen lymph nodes. · Allergic/Immunologic: No nasal congestion or hives. Medications:  Current Outpatient Medications   Medication Sig Dispense Refill    LORazepam (ATIVAN) 0.5 MG tablet Take 0.5 mg by mouth every 4 hours as needed for Anxiety.  hydroCHLOROthiazide (HYDRODIURIL) 25 MG tablet TAKE ONE TABLET BY MOUTH EVERY MORNING 90 tablet 3    Cholecalciferol (VITAMIN D3) 50 MCG (2000 UT) CAPS Take 2,000 Units by mouth daily      famotidine (PEPCID) 20 MG tablet Take 1 tablet by mouth daily 60 tablet 3    nitroGLYCERIN (NITROSTAT) 0.4 MG SL tablet Take one tablet under the tongue as needed for chest pain. May repeat every 5 minutes for up to 3 doses.  If no improvement, go to ER 25 tablet 3    mirtazapine (REMERON) 15 MG tablet Take 0.5 tablets by mouth nightly 30 tablet 3    albuterol (PROVENTIL) (2.5 MG/3ML) 0.083% nebulizer solution Take 3 mLs by nebulization every 6 hours as needed for Wheezing 120 each 3  09/27/2021    K 5.0 09/27/2021    CL 97 (L) 09/27/2021    CO2 30 09/27/2021    BUN 29 (H) 09/27/2021    CREATININE 1.07 (H) 09/27/2021    GLUCOSE 108 (H) 09/27/2021    CALCIUM 10.1 09/27/2021    PROT 7.4 09/27/2021    LABALBU 4.3 09/27/2021    BILITOT 0.25 (L) 09/27/2021    ALKPHOS 89 09/27/2021    AST 51 (H) 09/27/2021    ALT 27 09/27/2021    LABGLOM 48 (L) 09/27/2021    GFRAA 58 (L) 09/27/2021       EKG 12/9/2021: NSR, LVH, LAFB     Echo 5/21:  Normal left ventricular diameter. Left ventricular systolic function is normal. Left ventricular ejection fraction 55 %. Grade I (mild) left ventricular diastolic dysfunction. Mild aortic valve sclerosis. Mild aortic insufficiency. Mild mitral valve thickening. Mild mitral regurgitation. Mild tricuspid regurgitation. Estimated right ventricular systolic pressure is 59 mmHg. Moderate pulmonary hypertension. IVC Increased diameter and impaired or no inspiratory variation indicating elevated RA filling pressure (i.e. CVP). Past Medical and Surgical History, Problem List, Allergies, Medications, Labs, Imaging, all reviewed extensively in EMR and with the patient. Assessment:  - Dyspnea, likely related to undiagnosed COPD from long term smoking   - HTN  - HFpEF with garde I DD   - Mild AI, MR, TR  - Moderate P HTN- RVSP 59  - chronic active smoker       Plan:  - recommend bronchodilators and steroid course  - offered referral to pulmonary however patient and her daughter would like to discuss with PCP first   - no CHF/volume overload on exam  - continue HCTZ  - Strongly counseled to stop smoking. Patient has been started on e-cigs at assisted living facility to help her cut down      The patient is to continue heart healthy diet, weight loss and exercise as tolerated. Patient's medications and side effects were discussed. Medication refills were provided if needed. Follow up appointment timing was discussed.  All questions and concerns were addressed to patient's satisfaction. The patient is to follow up in 6 months or sooner if necessary. Thank you for allowing me to participate in the care of this patient, please do not hesitate to call if you have any questions. Nola Tracy MD [address] [facility]  [facility]  [phone]

## 2021-12-10 NOTE — TELEPHONE ENCOUNTER
Given patient has history of allergy to betamethasone, will start spiriva. Already has PRN order for albuterol.  If no improvement with spiriva, can consider symbicort, breo, etc. Spoke with Katalina Acevedo at [facility] to confirm

## 2021-12-16 NOTE — TELEPHONE ENCOUNTER
Received call from daughter Arnie Pierre [phone])- she would like you to see mom the next time you are at GP. She is concerned about her nutritional status. She has no appetite; 6 lb wt loss in 2 month (weighs 76 lb now). Her health seems to be declining- she is sleeping a lot more, and not smoking as much. She is currently taking Remeron 15 mg 1/2 tablet nightly. Daughter will get some nutritional supplement drinks. Please add to list for Tuesday. Is there anything GP should be doing in the meantime?

## 2021-12-16 NOTE — TELEPHONE ENCOUNTER
Please add to list for rounds on Tuesday. In the mean time, have [facility] monitor her dietary intake.  If she is not Boost/Ensure etc, can consider adding this

## 2021-12-23 NOTE — PROGRESS NOTES
Adria Major presents today for   Chief Complaint   Patient presents with    Blurred Vision    Vision Exam   .    HPI     Blurred Vision     Laterality: both eyes    Quality: blurred    Context: distance vision and reading              Comments     Last Vision Exam: 9/11/2017 Aw  Last Ophthalmology Exam: 2017 [facility]  Last Filled Glasses Rx: ? Insurance: Medicare  Update: Glasses  Distance and reading are getting more blurry. Using systane drops at home as needed, not every day just when eyes feel dry. Current Outpatient Medications   Medication Sig Dispense Refill    LORazepam (ATIVAN) 0.5 MG tablet TAKE 1 TABLET BY MOUTH EVERY 4 HOURS AS NEEDED FOR ANXIETY 90 tablet 0    tiotropium (SPIRIVA HANDIHALER) 18 MCG inhalation capsule Inhale 1 capsule into the lungs daily 90 capsule 1    hydroCHLOROthiazide (HYDRODIURIL) 25 MG tablet TAKE ONE TABLET BY MOUTH EVERY MORNING 90 tablet 3    Cholecalciferol (VITAMIN D3) 50 MCG (2000 UT) CAPS Take 2,000 Units by mouth daily      famotidine (PEPCID) 20 MG tablet Take 1 tablet by mouth daily 60 tablet 3    nitroGLYCERIN (NITROSTAT) 0.4 MG SL tablet Take one tablet under the tongue as needed for chest pain. May repeat every 5 minutes for up to 3 doses.  If no improvement, go to ER 25 tablet 3    mirtazapine (REMERON) 15 MG tablet Take 0.5 tablets by mouth nightly 30 tablet 3    albuterol (PROVENTIL) (2.5 MG/3ML) 0.083% nebulizer solution Take 3 mLs by nebulization every 6 hours as needed for Wheezing 120 each 3    ferrous gluconate (FERGON) 324 (38 Fe) MG tablet Take 324 mg by mouth daily (with breakfast)       albuterol sulfate HFA (PROVENTIL HFA) 108 (90 Base) MCG/ACT inhaler Inhale 2 puffs into the lungs every 6 hours as needed for Wheezing or Shortness of Breath 1 Inhaler 3    Fiber POWD (Orange 538 gm-S): Mix 1 tbsp and take by mouth at bedtime      Multiple Vitamins-Minerals (I-DANIA) TABS Take 1 tablet by mouth 2 times daily      Propylene Glycol (SYSTANE BALANCE OP) Apply 1 drop to eye 3 times daily       acetaminophen (TYLENOL) 500 MG tablet Take 500 mg by mouth every 4 hours as needed for Pain       Probiotic Product (PROBIOTIC DAILY PO) Take 1 tablet by mouth daily TruBiotics      Multiple Vitamins-Minerals (MULTIVITAMIN & MINERAL PO) Take 1 tablet by mouth daily.  docusate sodium (COLACE) 100 MG capsule Take 100 mg by mouth 2 times daily       ascorbic acid (VITAMIN C) 500 MG tablet Take 1,000 mg by mouth daily.  Biotin 5000 MCG CAPS Take 2 tablets by mouth daily        No current facility-administered medications for this visit. Family History   Problem Relation Age of Onset    Diabetes Mother     Heart Disease Mother     Stroke Mother     Kidney Disease Mother     Other Mother         Bowel problems.  Alcohol Abuse Father     Heart Disease Other     Stroke Other     Diabetes Other     Mult Sclerosis Other     Diabetes Other     High Blood Pressure Other     High Cholesterol Other     Glaucoma Neg Hx     Cataracts Neg Hx      Social History     Socioeconomic History    Marital status:       Spouse name: None    Number of children: None    Years of education: None    Highest education level: None   Occupational History    None   Tobacco Use    Smoking status: Current Every Day Smoker     Packs/day: 1.00     Years: 68.00     Pack years: 68.00     Types: Cigarettes    Smokeless tobacco: Never Used   Vaping Use    Vaping Use: Never used   Substance and Sexual Activity    Alcohol use: No    Drug use: No    Sexual activity: None   Other Topics Concern    None   Social History Narrative    None     Social Determinants of Health     Financial Resource Strain:     Difficulty of Paying Living Expenses: Not on file   Food Insecurity:     Worried About Running Out of Food in the Last Year: Not on file    Mona of Food in the Last Year: Not on file   Transportation Needs:     Lack of Transportation (Medical): Not on file    Lack of Transportation (Non-Medical): Not on file   Physical Activity:     Days of Exercise per Week: Not on file    Minutes of Exercise per Session: Not on file   Stress:     Feeling of Stress : Not on file   Social Connections:     Frequency of Communication with Friends and Family: Not on file    Frequency of Social Gatherings with Friends and Family: Not on file    Attends Mandaeism Services: Not on file    Active Member of [address] [place] [address] or Organizations: Not on file    Attends Club or Organization Meetings: Not on file    Marital Status: Not on file   Intimate Partner Violence:     Fear of Current or Ex-Partner: Not on file    Emotionally Abused: Not on file    Physically Abused: Not on file    Sexually Abused: Not on file   Housing Stability:     Unable to Pay for Housing in the Last Year: Not on file    Number of [place] in the Last Year: Not on file    Unstable Housing in the Last Year: Not on file     Past Medical History:   Diagnosis Date    Anxiety     Blepharochalasis     Chest pain     [facility]     Chronic constipation     Chronic obstructive pulmonary disease ([facility]     Closed fracture of proximal end of right humerus with routine healing 2/20/2018    Coronary artery disease     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     GERD (gastroesophageal reflux disease)     Hyperlipidemia     Hypothyroidism     Intestinal adhesions     Iron deficiency anemia     Microalbuminuria     Migraines     Non-ST elevation myocardial infarction (NSTEMI) [facility]     anteroseptal    Osteoarthritis     Osteoporosis     Palpitations     Pseudophakos     Bilateral.    20/30- +2.50 5.50BO      Left [place] -1.50 132 20/150 +2.50 5. 5BO            Manifest Refraction #2 (Auto)       Sphere Cylinder Axis Dist VA Add Horz Prism Vert Prism    Right +0.50 -1.25 077        Left +0.00 -1.00 132                   Final Rx       Sphere Cylinder Axis Add Horz Prism Vert Prism    Right +0.50 -1. .25 077 +2.50 5.50BO      Left [place] -1.50 132 +2.50 5.5BO      Type: Bifocal    Expiration Date: 12/24/2023            No orders of the defined types were placed in this encounter. IMPRESSION:  1. Blurred vision, bilateral    2. Insulin dependent type 2 diabetes mellitus ([address]    3. ARMD (age related macular degeneration)        PLAN:    1. New glasses recommended  Discussed the patient's diagnosis of diabetes and the impact this can have on their ocular health, potentially even leading to permanent blindness. I discussed with the patient the importance of continued follow-up and management with their primary care physician to control their glycemic, blood pressure, and lipid levels. The patient verbalized understanding. 3. Discussed the nature of age-related macular degeneration and the need for regular monitoring. The patient was counseled on signs and symptoms of choroidal neovascularization (missing areas of vision, waviness/distortion of vision, decreased vision).  The patient was given an Amsler grid and counseled on its use.    Patient to use the Amsler grid daily, consider AREDS2 vitamins and use UV protection during the day while outside        Patient Instructions   New glasses recommended for full time    Artificial tears as needed for symptoms of dry eye      Return in about 2 years (around 12/23/2023) for complete eye exam.

## 2022-01-01 ENCOUNTER — TELEPHONE (OUTPATIENT)
Dept: INTERNAL MEDICINE | Age: 87
End: 2022-01-01

## 2022-01-01 ENCOUNTER — HOSPITAL ENCOUNTER (OUTPATIENT)
Dept: CT IMAGING | Age: 87
Discharge: HOME | End: 2022-02-03
Payer: MEDICARE

## 2022-01-01 ENCOUNTER — OUTSIDE SERVICES (OUTPATIENT)
Dept: INTERNAL MEDICINE | Age: 87
End: 2022-01-01
Payer: MEDICARE

## 2022-01-01 ENCOUNTER — HOSPITAL ENCOUNTER (OUTPATIENT)
Age: 87
Setting detail: SPECIMEN
Discharge: HOME | End: 2022-01-20
Payer: MEDICARE

## 2022-01-01 ENCOUNTER — HOSPITAL ENCOUNTER (OUTPATIENT)
Age: 87
Setting detail: SPECIMEN
Discharge: HOME | End: 2022-02-02
Payer: MEDICARE

## 2022-01-01 ENCOUNTER — HOSPITAL ENCOUNTER (OUTPATIENT)
Age: 87
Setting detail: SPECIMEN
Discharge: HOME | End: 2022-01-27
Payer: MEDICARE

## 2022-01-01 ENCOUNTER — OFFICE VISIT (OUTPATIENT)
Dept: ONCOLOGY | Age: 87
End: 2022-01-01
Payer: MEDICARE

## 2022-01-01 ENCOUNTER — PATIENT MESSAGE (OUTPATIENT)
Dept: CARE COORDINATION | Age: 87
End: 2022-01-01

## 2022-01-01 VITALS
HEART RATE: 88 BPM | SYSTOLIC BLOOD PRESSURE: 96 MMHG | OXYGEN SATURATION: 88 % | DIASTOLIC BLOOD PRESSURE: 60 MMHG | RESPIRATION RATE: 16 BRPM | TEMPERATURE: 97.6 F

## 2022-01-01 VITALS
SYSTOLIC BLOOD PRESSURE: 145 MMHG | TEMPERATURE: 98.7 F | DIASTOLIC BLOOD PRESSURE: 53 MMHG | HEART RATE: 88 BPM | RESPIRATION RATE: 18 BRPM | BODY MASS INDEX: 14.86 KG/M2 | OXYGEN SATURATION: 93 % | WEIGHT: 76.08 LBS

## 2022-01-01 VITALS
DIASTOLIC BLOOD PRESSURE: 46 MMHG | OXYGEN SATURATION: 93 % | HEART RATE: 96 BPM | SYSTOLIC BLOOD PRESSURE: 110 MMHG | TEMPERATURE: 97.8 F | RESPIRATION RATE: 24 BRPM

## 2022-01-01 VITALS
DIASTOLIC BLOOD PRESSURE: 60 MMHG | BODY MASS INDEX: 15.39 KG/M2 | TEMPERATURE: 97.3 F | HEART RATE: 81 BPM | RESPIRATION RATE: 16 BRPM | SYSTOLIC BLOOD PRESSURE: 122 MMHG | HEIGHT: 60 IN | OXYGEN SATURATION: 90 % | WEIGHT: 78.4 LBS

## 2022-01-01 VITALS
TEMPERATURE: 97.9 F | SYSTOLIC BLOOD PRESSURE: 98 MMHG | RESPIRATION RATE: 20 BRPM | OXYGEN SATURATION: 94 % | DIASTOLIC BLOOD PRESSURE: 51 MMHG | HEART RATE: 87 BPM

## 2022-01-01 VITALS
OXYGEN SATURATION: 90 % | SYSTOLIC BLOOD PRESSURE: 116 MMHG | TEMPERATURE: 97.7 F | DIASTOLIC BLOOD PRESSURE: 70 MMHG | RESPIRATION RATE: 18 BRPM | HEART RATE: 98 BPM

## 2022-01-01 DIAGNOSIS — F41.9 ANXIETY: ICD-10-CM

## 2022-01-01 DIAGNOSIS — I25.10 CORONARY ARTERY DISEASE INVOLVING NATIVE CORONARY ARTERY OF NATIVE HEART, UNSPECIFIED WHETHER ANGINA PRESENT: ICD-10-CM

## 2022-01-01 DIAGNOSIS — I25.10 CORONARY ARTERY DISEASE INVOLVING NATIVE CORONARY ARTERY OF NATIVE HEART, UNSPECIFIED WHETHER ANGINA PRESENT: Primary | ICD-10-CM

## 2022-01-01 DIAGNOSIS — R91.8 MULTIPLE PULMONARY NODULES DETERMINED BY COMPUTED TOMOGRAPHY OF LUNG: Primary | ICD-10-CM

## 2022-01-01 DIAGNOSIS — E11.9 CONTROLLED TYPE 2 DIABETES MELLITUS WITHOUT COMPLICATION, WITHOUT LONG-TERM CURRENT USE OF INSULIN (HCC): ICD-10-CM

## 2022-01-01 DIAGNOSIS — C79.9 METASTATIC MALIGNANT NEOPLASM, UNSPECIFIED SITE (HCC): Primary | ICD-10-CM

## 2022-01-01 DIAGNOSIS — E03.8 OTHER SPECIFIED HYPOTHYROIDISM: ICD-10-CM

## 2022-01-01 DIAGNOSIS — I50.30 DIASTOLIC HEART FAILURE, UNSPECIFIED HF CHRONICITY (HCC): ICD-10-CM

## 2022-01-01 DIAGNOSIS — D50.8 IRON DEFICIENCY ANEMIA SECONDARY TO INADEQUATE DIETARY IRON INTAKE: ICD-10-CM

## 2022-01-01 DIAGNOSIS — R91.1 PULMONARY NODULE: ICD-10-CM

## 2022-01-01 DIAGNOSIS — R05.9 COUGH: Primary | ICD-10-CM

## 2022-01-01 DIAGNOSIS — E87.6 HYPOKALEMIA: ICD-10-CM

## 2022-01-01 DIAGNOSIS — H61.23 BILATERAL IMPACTED CERUMEN: ICD-10-CM

## 2022-01-01 DIAGNOSIS — K76.9 LIVER LESION: ICD-10-CM

## 2022-01-01 DIAGNOSIS — N18.9 CHRONIC KIDNEY DISEASE, UNSPECIFIED CKD STAGE: ICD-10-CM

## 2022-01-01 DIAGNOSIS — M25.571 RIGHT ANKLE PAIN, UNSPECIFIED CHRONICITY: Primary | ICD-10-CM

## 2022-01-01 DIAGNOSIS — R91.8 MULTIPLE PULMONARY NODULES DETERMINED BY COMPUTED TOMOGRAPHY OF LUNG: ICD-10-CM

## 2022-01-01 DIAGNOSIS — R63.4 WEIGHT LOSS: Primary | ICD-10-CM

## 2022-01-01 DIAGNOSIS — E11.9 CONTROLLED TYPE 2 DIABETES MELLITUS WITHOUT COMPLICATION, WITHOUT LONG-TERM CURRENT USE OF INSULIN (HCC): Primary | ICD-10-CM

## 2022-01-01 DIAGNOSIS — K21.9 GASTROESOPHAGEAL REFLUX DISEASE WITHOUT ESOPHAGITIS: ICD-10-CM

## 2022-01-01 DIAGNOSIS — R91.1 PULMONARY NODULE: Primary | ICD-10-CM

## 2022-01-01 DIAGNOSIS — J43.8 OTHER EMPHYSEMA (HCC): ICD-10-CM

## 2022-01-01 DIAGNOSIS — R63.4 WEIGHT LOSS: ICD-10-CM

## 2022-01-01 DIAGNOSIS — E78.2 MIXED HYPERLIPIDEMIA: ICD-10-CM

## 2022-01-01 DIAGNOSIS — F32.4 MAJOR DEPRESSIVE DISORDER WITH SINGLE EPISODE, IN PARTIAL REMISSION (HCC): ICD-10-CM

## 2022-01-01 DIAGNOSIS — M81.0 OSTEOPOROSIS, UNSPECIFIED OSTEOPOROSIS TYPE, UNSPECIFIED PATHOLOGICAL FRACTURE PRESENCE: ICD-10-CM

## 2022-01-01 DIAGNOSIS — M15.9 PRIMARY OSTEOARTHRITIS INVOLVING MULTIPLE JOINTS: ICD-10-CM

## 2022-01-01 DIAGNOSIS — Z51.5 PALLIATIVE CARE PATIENT: Primary | ICD-10-CM

## 2022-01-01 DIAGNOSIS — D50.8 IRON DEFICIENCY ANEMIA SECONDARY TO INADEQUATE DIETARY IRON INTAKE: Primary | ICD-10-CM

## 2022-01-01 LAB
ABSOLUTE EOS #: 0.12 K/UL (ref 0–0.44)
ABSOLUTE EOS #: 0.24 K/UL (ref 0–0.44)
ABSOLUTE IMMATURE GRANULOCYTE: <0.03 K/UL (ref 0–0.3)
ABSOLUTE IMMATURE GRANULOCYTE: <0.03 K/UL (ref 0–0.3)
ABSOLUTE LYMPH #: 1.43 K/UL (ref 1.1–3.7)
ABSOLUTE LYMPH #: 1.83 K/UL (ref 1.1–3.7)
ABSOLUTE MONO #: 0.62 K/UL (ref 0.1–1.2)
ABSOLUTE MONO #: 0.92 K/UL (ref 0.1–1.2)
ANION GAP SERPL CALCULATED.3IONS-SCNC: 14 MMOL/L (ref 9–17)
BASOPHILS # BLD: 1 % (ref 0–2)
BASOPHILS # BLD: 1 % (ref 0–2)
BASOPHILS ABSOLUTE: 0.06 K/UL (ref 0–0.2)
BASOPHILS ABSOLUTE: 0.08 K/UL (ref 0–0.2)
BUN BLDV-MCNC: 25 MG/DL (ref 8–23)
BUN/CREAT BLD: 26 (ref 9–20)
CALCIUM SERPL-MCNC: 9.8 MG/DL (ref 8.6–10.4)
CHLORIDE BLD-SCNC: 100 MMOL/L (ref 98–107)
CO2: 26 MMOL/L (ref 20–31)
CREAT SERPL-MCNC: 0.98 MG/DL (ref 0.5–0.9)
DIFFERENTIAL TYPE: ABNORMAL
DIFFERENTIAL TYPE: ABNORMAL
EOSINOPHILS RELATIVE PERCENT: 2 % (ref 1–4)
EOSINOPHILS RELATIVE PERCENT: 4 % (ref 1–4)
FERRITIN: 19 UG/L (ref 13–150)
FOLATE: >20 NG/ML
GFR AFRICAN AMERICAN: >60 ML/MIN
GFR NON-AFRICAN AMERICAN: 53 ML/MIN
GFR SERPL CREATININE-BSD FRML MDRD: ABNORMAL ML/MIN/{1.73_M2}
GFR SERPL CREATININE-BSD FRML MDRD: ABNORMAL ML/MIN/{1.73_M2}
GLUCOSE BLD-MCNC: 173 MG/DL (ref 70–99)
HCT VFR BLD CALC: 25 % (ref 36.3–47.1)
HCT VFR BLD CALC: 29.2 % (ref 36.3–47.1)
HCT VFR BLD CALC: 32 % (ref 36.3–47.1)
HEMOGLOBIN: 7.3 G/DL (ref 11.9–15.1)
HEMOGLOBIN: 8.4 G/DL (ref 11.9–15.1)
HEMOGLOBIN: 9.1 G/DL (ref 11.9–15.1)
IMMATURE GRANULOCYTES: 0 %
IMMATURE GRANULOCYTES: 0 %
IRON SATURATION: 4 % (ref 20–55)
IRON: 12 UG/DL (ref 37–145)
LYMPHOCYTES # BLD: 22 % (ref 24–43)
LYMPHOCYTES # BLD: 28 % (ref 24–43)
MCH RBC QN AUTO: 23.7 PG (ref 25.2–33.5)
MCH RBC QN AUTO: 23.9 PG (ref 25.2–33.5)
MCH RBC QN AUTO: 23.9 PG (ref 25.2–33.5)
MCHC RBC AUTO-ENTMCNC: 28.4 G/DL (ref 25.2–33.5)
MCHC RBC AUTO-ENTMCNC: 28.8 G/DL (ref 25.2–33.5)
MCHC RBC AUTO-ENTMCNC: 29.2 G/DL (ref 25.2–33.5)
MCV RBC AUTO: 81.7 FL (ref 82.6–102.9)
MCV RBC AUTO: 82.3 FL (ref 82.6–102.9)
MCV RBC AUTO: 84 FL (ref 82.6–102.9)
MONOCYTES # BLD: 10 % (ref 3–12)
MONOCYTES # BLD: 14 % (ref 3–12)
NRBC AUTOMATED: 0 PER 100 WBC
PDW BLD-RTO: 17.9 % (ref 11.8–14.4)
PDW BLD-RTO: 18 % (ref 11.8–14.4)
PDW BLD-RTO: 18.4 % (ref 11.8–14.4)
PLATELET # BLD: 477 K/UL (ref 138–453)
PLATELET # BLD: 523 K/UL (ref 138–453)
PLATELET # BLD: 648 K/UL (ref 138–453)
PLATELET ESTIMATE: ABNORMAL
PLATELET ESTIMATE: ABNORMAL
PMV BLD AUTO: 9.4 FL (ref 8.1–13.5)
PMV BLD AUTO: 9.4 FL (ref 8.1–13.5)
PMV BLD AUTO: 9.6 FL (ref 8.1–13.5)
POTASSIUM SERPL-SCNC: 3.3 MMOL/L (ref 3.7–5.3)
POTASSIUM SERPL-SCNC: 3.5 MMOL/L (ref 3.7–5.3)
POTASSIUM SERPL-SCNC: 4 MMOL/L (ref 3.7–5.3)
RBC # BLD: 3.06 M/UL (ref 3.95–5.11)
RBC # BLD: 3.55 M/UL (ref 3.95–5.11)
RBC # BLD: 3.81 M/UL (ref 3.95–5.11)
RBC # BLD: ABNORMAL 10*6/UL
RBC # BLD: ABNORMAL 10*6/UL
SEG NEUTROPHILS: 59 % (ref 36–65)
SEG NEUTROPHILS: 59 % (ref 36–65)
SEGMENTED NEUTROPHILS ABSOLUTE COUNT: 3.71 K/UL (ref 1.5–8.1)
SEGMENTED NEUTROPHILS ABSOLUTE COUNT: 3.89 K/UL (ref 1.5–8.1)
SODIUM BLD-SCNC: 140 MMOL/L (ref 135–144)
TOTAL IRON BINDING CAPACITY: 284 UG/DL (ref 250–450)
TSH SERPL DL<=0.05 MIU/L-ACNC: 2.69 MIU/L (ref 0.3–5)
UNSATURATED IRON BINDING CAPACITY: 272 UG/DL (ref 112–347)
VITAMIN B-12: 841 PG/ML (ref 232–1245)
WBC # BLD: 6.4 K/UL (ref 3.5–11.3)
WBC # BLD: 6.6 K/UL (ref 3.5–11.3)
WBC # BLD: 8.9 K/UL (ref 3.5–11.3)
WBC # BLD: ABNORMAL 10*3/UL
WBC # BLD: ABNORMAL 10*3/UL

## 2022-01-01 PROCEDURE — 85027 COMPLETE CBC AUTOMATED: CPT

## 2022-01-01 PROCEDURE — 1090F PRES/ABSN URINE INCON ASSESS: CPT | Performed by: INTERNAL MEDICINE

## 2022-01-01 PROCEDURE — 85025 COMPLETE CBC W/AUTO DIFF WBC: CPT

## 2022-01-01 PROCEDURE — 84132 ASSAY OF SERUM POTASSIUM: CPT

## 2022-01-01 PROCEDURE — 99204 OFFICE O/P NEW MOD 45 MIN: CPT | Performed by: INTERNAL MEDICINE

## 2022-01-01 PROCEDURE — 80048 BASIC METABOLIC PNL TOTAL CA: CPT

## 2022-01-01 PROCEDURE — 82728 ASSAY OF FERRITIN: CPT

## 2022-01-01 PROCEDURE — 71250 CT THORAX DX C-: CPT

## 2022-01-01 PROCEDURE — 83550 IRON BINDING TEST: CPT

## 2022-01-01 PROCEDURE — 36415 COLL VENOUS BLD VENIPUNCTURE: CPT

## 2022-01-01 PROCEDURE — 84443 ASSAY THYROID STIM HORMONE: CPT

## 2022-01-01 PROCEDURE — 82746 ASSAY OF FOLIC ACID SERUM: CPT

## 2022-01-01 PROCEDURE — G8484 FLU IMMUNIZE NO ADMIN: HCPCS | Performed by: INTERNAL MEDICINE

## 2022-01-01 PROCEDURE — G8427 DOCREV CUR MEDS BY ELIG CLIN: HCPCS | Performed by: INTERNAL MEDICINE

## 2022-01-01 PROCEDURE — 99203 OFFICE O/P NEW LOW 30 MIN: CPT | Performed by: INTERNAL MEDICINE

## 2022-01-01 PROCEDURE — 83540 ASSAY OF IRON: CPT

## 2022-01-01 PROCEDURE — G8419 CALC BMI OUT NRM PARAM NOF/U: HCPCS | Performed by: INTERNAL MEDICINE

## 2022-01-01 PROCEDURE — 82607 VITAMIN B-12: CPT

## 2022-01-01 RX ORDER — BISACODYL 10 MG
10 SUPPOSITORY, RECTAL RECTAL DAILY PRN
COMMUNITY

## 2022-01-01 RX ORDER — MORPHINE SULFATE 100 MG/5ML
5 SOLUTION ORAL
COMMUNITY

## 2022-01-01 RX ORDER — DEXAMETHASONE 4 MG/1
4 TABLET ORAL DAILY
COMMUNITY

## 2022-01-01 RX ORDER — POTASSIUM CHLORIDE 750 MG/1
10 TABLET, EXTENDED RELEASE ORAL DAILY
Qty: 30 TABLET | Refills: 0 | Status: SHIPPED | OUTPATIENT
Start: 2022-01-01 | End: 2022-01-01

## 2022-01-01 RX ORDER — IPRATROPIUM BROMIDE AND ALBUTEROL SULFATE 2.5; .5 MG/3ML; MG/3ML
1 SOLUTION RESPIRATORY (INHALATION) EVERY 4 HOURS
COMMUNITY

## 2022-01-01 RX ORDER — LIDOCAINE 4 G/G
1 PATCH TOPICAL DAILY
COMMUNITY

## 2022-01-01 RX ORDER — LORAZEPAM 0.5 MG/1
TABLET ORAL
Qty: 90 TABLET | Refills: 0 | Status: SHIPPED | OUTPATIENT
Start: 2022-01-01 | End: 2022-01-01

## 2022-01-01 RX ORDER — ONDANSETRON 4 MG/1
4 TABLET, FILM COATED ORAL EVERY 6 HOURS PRN
COMMUNITY

## 2022-01-01 RX ORDER — LORAZEPAM 0.5 MG/1
0.5 TABLET ORAL EVERY 4 HOURS PRN
COMMUNITY

## 2022-01-01 RX ORDER — AZITHROMYCIN 250 MG/1
250 TABLET, FILM COATED ORAL SEE ADMIN INSTRUCTIONS
Qty: 6 TABLET | Refills: 0 | Status: SHIPPED | OUTPATIENT
Start: 2022-01-01 | End: 2022-01-01

## 2022-01-01 RX ORDER — LORAZEPAM 0.5 MG/1
0.5 TABLET ORAL EVERY 6 HOURS
COMMUNITY

## 2022-01-01 RX ORDER — SENNA PLUS 8.6 MG/1
1 TABLET ORAL 2 TIMES DAILY
COMMUNITY

## 2022-01-01 RX ORDER — SENNA PLUS 8.6 MG/1
1 TABLET ORAL EVERY 6 HOURS PRN
COMMUNITY

## 2022-01-01 RX ORDER — POTASSIUM CHLORIDE 750 MG/1
20 TABLET, EXTENDED RELEASE ORAL DAILY
Qty: 30 TABLET | Refills: 0
Start: 2022-01-01 | End: 2022-01-01 | Stop reason: ALTCHOICE

## 2022-01-01 RX ORDER — POTASSIUM CHLORIDE 1500 MG/1
20 TABLET, FILM COATED, EXTENDED RELEASE ORAL DAILY
COMMUNITY

## 2022-01-01 RX ORDER — MIRTAZAPINE 15 MG/1
15 TABLET, FILM COATED ORAL NIGHTLY
Qty: 30 TABLET | Refills: 3
Start: 2022-01-01 | End: 2022-01-01 | Stop reason: ALTCHOICE

## 2022-01-01 RX ORDER — OXYCODONE HYDROCHLORIDE 5 MG/1
2.5 TABLET ORAL EVERY 6 HOURS PRN
COMMUNITY

## 2022-01-01 ASSESSMENT — ENCOUNTER SYMPTOMS
SHORTNESS OF BREATH: 0
VOMITING: 0
NAUSEA: 0
WHEEZING: 0
SORE THROAT: 0
DIARRHEA: 0
NAUSEA: 0
RHINORRHEA: 0
CHEST TIGHTNESS: 0
DIARRHEA: 0
SHORTNESS OF BREATH: 0
VOMITING: 0
DIARRHEA: 0
COUGH: 0
RHINORRHEA: 0
RHINORRHEA: 0
VOMITING: 0
SORE THROAT: 0
CHEST TIGHTNESS: 0
COUGH: 1
VOMITING: 0
COUGH: 0
WHEEZING: 0
DIARRHEA: 0
SHORTNESS OF BREATH: 0
DIARRHEA: 0
NAUSEA: 0
VOMITING: 0
WHEEZING: 0

## 2022-01-11 NOTE — PROGRESS NOTES
[facility]      Kaylee Riedel is a 80 y.o. female resident of [facility] who presents today for medical conditions/complaints as noted below. HPI:     HPI   Patient presents for evaluation and management of chronic medical conditions as noted below. Diabetes has been stable on previous A1c 5.7. Given she is currently underweight, have been working on increasing her caloric intake. Not currently on medication for dyslipidemia to minimize polypharmacy. Follows with cardiology for congestive heart failure and coronary artery disease. She has been borderline hypotensive recently, 98/51 most recently. Per review of records, she has been stable on this current dose of hydrochlorothiazide for several months, and has been stable with blood pressure around 100 over 50s. COPD has been gradually worsening. Hypothyroidism is well controlled without medication. Anemia has been stable, most recent hemoglobin 10.0. Continues on iron supplements. Depression and anxiety remain well controlled on Remeron, sertraline, and Ativan. As previously noted patient has been stable on Ativan for nearly 3 decades and previously reviewed risks and benefits of continuing long-term benzodiazepines in elderly with both patient and daughter. Arthritis has been stable. Continues on calcium supplements for osteoporosis    GERD is stable on Pepcid.     Current Outpatient Medications   Medication Sig Dispense Refill    LORazepam (ATIVAN) 0.5 MG tablet TAKE 1 TABLET BY MOUTH EVERY 4 HOURS AS NEEDED FOR ANXIETY 90 tablet 0    tiotropium (SPIRIVA HANDIHALER) 18 MCG inhalation capsule Inhale 1 capsule into the lungs daily 90 capsule 1    hydroCHLOROthiazide (HYDRODIURIL) 25 MG tablet TAKE ONE TABLET BY MOUTH EVERY MORNING 90 tablet 3    Cholecalciferol (VITAMIN D3) 50 MCG (2000 UT) CAPS Take 2,000 Units by mouth daily      famotidine (PEPCID) 20 MG tablet Take 1 tablet by mouth daily 60 tablet 3  nitroGLYCERIN (NITROSTAT) 0.4 MG SL tablet Take one tablet under the tongue as needed for chest pain. May repeat every 5 minutes for up to 3 doses. If no improvement, go to ER 25 tablet 3    mirtazapine (REMERON) 15 MG tablet Take 0.5 tablets by mouth nightly 30 tablet 3    albuterol (PROVENTIL) (2.5 MG/3ML) 0.083% nebulizer solution Take 3 mLs by nebulization every 6 hours as needed for Wheezing 120 each 3    ferrous gluconate (FERGON) 324 (38 Fe) MG tablet Take 324 mg by mouth daily (with breakfast)       albuterol sulfate HFA (PROVENTIL HFA) 108 (90 Base) MCG/ACT inhaler Inhale 2 puffs into the lungs every 6 hours as needed for Wheezing or Shortness of Breath 1 Inhaler 3    Fiber POWD (Orange 538 gm-S): Mix 1 tbsp and take by mouth at bedtime      Multiple Vitamins-Minerals (I-DANIA) TABS Take 1 tablet by mouth 2 times daily      Propylene Glycol (SYSTANE BALANCE OP) Apply 1 drop to eye 3 times daily       acetaminophen (TYLENOL) 500 MG tablet Take 500 mg by mouth every 4 hours as needed for Pain       Probiotic Product (PROBIOTIC DAILY PO) Take 1 tablet by mouth daily TruBiotics      Multiple Vitamins-Minerals (MULTIVITAMIN & MINERAL PO) Take 1 tablet by mouth daily.  docusate sodium (COLACE) 100 MG capsule Take 100 mg by mouth 2 times daily       ascorbic acid (VITAMIN C) 500 MG tablet Take 1,000 mg by mouth daily.  Biotin 5000 MCG CAPS Take 2 tablets by mouth daily        No current facility-administered medications for this visit.      Allergies   Allergen Reactions    Acyclovir And Related Diarrhea and Nausea Only    Albuterol     Amitriptyline      Dizziness      Asa [Aspirin]      Stomach upset      Betadine [Povidone Iodine]     Biaxin [Clarithromycin] Nausea Only    Buspar [Buspirone]     Cefuroxime Axetil      Diarrhea      Celestone [Betamethasone]     Codeine     Darvocet A500 [Propoxyphene N-Acetaminophen]     Dicyclomine Hcl     Doxycycline Nausea Only    Esomeprazole Magnesium     Fluconazole     Lidex [Fluocinolone]     Neomycin     Pcn [Penicillins] Hives    Protonix [Pantoprazole]     Quinolones Hives    Statins [Statins]      Leg cramps      Tape [Adhesive Tape]     Tramadol      \"makes her walk into walls\"    Valium     Vicodin [Hydrocodone-Acetaminophen]     Zithromax [Azithromycin]      Nausea      Hydrocodone Nausea Only       Health Maintenance   Topic Date Due    Shingles Vaccine (1 of 2) Never done    COVID-19 Vaccine (3 - Booster for Moderna series) 08/17/2021    Flu vaccine (1) 09/01/2021    TSH testing  12/15/2021    DTaP/Tdap/Td vaccine (2 - Tdap) 01/04/2037 (Originally 3/23/2008)    Depression Monitoring  03/16/2022    Annual Wellness Visit (AWV)  03/17/2022    Potassium monitoring  09/27/2022    Creatinine monitoring  09/27/2022    Pneumococcal 65+ years Vaccine  Completed    Hepatitis A vaccine  Aged Out    Hib vaccine  Aged Out    Meningococcal (ACWY) vaccine  Aged Out       Subjective:      Review of Systems   Constitutional: Negative for chills and fever. HENT: Negative for congestion and sore throat. Respiratory: Negative for chest tightness and shortness of breath. Cardiovascular: Negative for chest pain and leg swelling. Gastrointestinal: Negative for diarrhea, nausea and vomiting. Genitourinary: Negative for difficulty urinating and dysuria. Musculoskeletal: Negative for gait problem and myalgias. Neurological: Negative for dizziness and headaches. Psychiatric/Behavioral: Negative for confusion and decreased concentration. Due to patient's clinical status, ROS of symptoms was completed by discussion with long term care facility staff, as well as with input from patient. Objective:     Vitals:    01/11/22 1204   BP: (!) 98/51   Pulse: 87   Resp: 20   Temp: 97.9 °F (36.6 °C)   SpO2: 94%     Physical Exam  Vitals and nursing note reviewed.    Constitutional:       General: She is not in acute distress. Appearance: She is well-developed. HENT:      Head: Normocephalic and atraumatic. Right Ear: External ear normal.      Left Ear: External ear normal.   Eyes:      General: Lids are normal.      Extraocular Movements: Extraocular movements intact. Conjunctiva/sclera: Conjunctivae normal.   Neck:      Thyroid: No thyromegaly. Cardiovascular:      Rate and Rhythm: Normal rate and regular rhythm. Heart sounds: No murmur heard. Pulmonary:      Effort: Pulmonary effort is normal. No accessory muscle usage or respiratory distress. Breath sounds: Normal breath sounds. No wheezing, rhonchi or rales. Abdominal:      Palpations: Abdomen is soft. Tenderness: There is no abdominal tenderness. There is no guarding or rebound. Musculoskeletal:         General: Normal range of motion. Cervical back: Neck supple. Right lower leg: No edema. Left lower leg: No edema. Lymphadenopathy:      Cervical: No cervical adenopathy. Skin:     General: Skin is warm and dry. Nails: There is no clubbing. Neurological:      Mental Status: She is alert. Coordination: Coordination normal.   Psychiatric:         Mood and Affect: Mood normal.         Speech: Speech normal.         Behavior: Behavior normal.         Assessment/Plan:        1. Controlled type 2 diabetes mellitus without complication, without long-term current use of insulin ([address]  stable  - Continue to monitor. Check BMP    2. Mixed hyperlipidemia,  stable  - Continue to monitor    3. Coronary artery disease involving native coronary artery of native heart, unspecified whether angina present, stable  4. Diastolic heart failure, unspecified HF chronicity ([address] stable  - Continue to follow with cardiology. Blood pressure is borderline today, will have staff check once daily for a week and send results to clinic.     5. Other emphysema ([address] worsening  - Will advise family to consider pulmonology referral    6. Other specified hypothyroidism, stable  - Recheck TSH    7. Iron deficiency anemia secondary to inadequate dietary iron intake, stable  - Check CBC    8. Major depressive disorder with single episode, in partial remission ([facility] stable  9. Anxiety,  stable  - Continue to monitor. No signs of drug abuse or diversion    10. Primary osteoarthritis involving multiple joints,  stable  - Continue to monitor    11. Osteoporosis, unspecified osteoporosis type, unspecified pathological fracture presence,  stable  - Continue to monitor    12. Gastroesophageal reflux disease without esophagitis,  stable  - Continue to monitor        Return in about 3 months (around 4/11/2022). Orders Placed This Encounter   Procedures    TSH with Reflex     Standing Status:   Future     Standing Expiration Date:   1/14/2023    Basic Metabolic Panel     Standing Status:   Future     Standing Expiration Date:   1/14/2023    CBC Auto Differential     Standing Status:   Future     Standing Expiration Date:   1/14/2023     No orders of the defined types were placed in this encounter.               Electronically signed by OPHELIA Rosas CNP on 1/14/2022 at 12:57 PM

## 2022-01-14 NOTE — TELEPHONE ENCOUNTER
Is follow-up from visit, please fax orders for TSH, CBC and BMP. Per recent cardiology progress note, cardiology had recommended possible follow-up with pulmonology for her worsening COPD. Daughter had noted she wanted to discuss with PCP prior. Please note I would also recommend following up with pulmonology, please let me know if family would like to discuss further    Blood pressure was noted to be borderline hypotensive. Please have staff check this once daily for a week and send results to clinic.

## 2022-01-17 NOTE — TELEPHONE ENCOUNTER
Daughter requesting you see pt tomorrow at [facility]:  Right foot swelling, and left hip redness (not open yet).

## 2022-01-18 NOTE — PROGRESS NOTES
[facility]      Debra Becker is a 80 y.o. female resident of [facility] who presents today for medical conditions/complaints as noted below. HPI:     HPI    Patient present for evaluation and management of ankle pain. Last Friday, patient had expressed concern that her ankle was painful. She does have a history of injury years ago, but no recent injury. Staff did contact me via phone on Friday 01/14/22 for RLE edema, and I advised further evaluation in urgent care for possible DVT, patient declined. Today edema has nearly resolved, only trace swelling around the ankle, which patient and staff note is consistent with that seen from her previous injury. Denies chest pain or dyspnea. Current Outpatient Medications   Medication Sig Dispense Refill    LORazepam (ATIVAN) 0.5 MG tablet TAKE 1 TABLET BY MOUTH EVERY 4 HOURS AS NEEDED FOR ANXIETY 90 tablet 0    tiotropium (SPIRIVA HANDIHALER) 18 MCG inhalation capsule Inhale 1 capsule into the lungs daily 90 capsule 1    hydroCHLOROthiazide (HYDRODIURIL) 25 MG tablet TAKE ONE TABLET BY MOUTH EVERY MORNING 90 tablet 3    Cholecalciferol (VITAMIN D3) 50 MCG (2000 UT) CAPS Take 2,000 Units by mouth daily      famotidine (PEPCID) 20 MG tablet Take 1 tablet by mouth daily 60 tablet 3    nitroGLYCERIN (NITROSTAT) 0.4 MG SL tablet Take one tablet under the tongue as needed for chest pain. May repeat every 5 minutes for up to 3 doses.  If no improvement, go to ER 25 tablet 3    mirtazapine (REMERON) 15 MG tablet Take 0.5 tablets by mouth nightly 30 tablet 3    albuterol (PROVENTIL) (2.5 MG/3ML) 0.083% nebulizer solution Take 3 mLs by nebulization every 6 hours as needed for Wheezing 120 each 3    ferrous gluconate (FERGON) 324 (38 Fe) MG tablet Take 324 mg by mouth daily (with breakfast)       albuterol sulfate HFA (PROVENTIL HFA) 108 (90 Base) MCG/ACT inhaler Inhale 2 puffs into the lungs every 6 hours as needed for Wheezing or Shortness of Depression Monitoring  03/16/2022    Annual Wellness Visit (AWV)  03/17/2022    Potassium monitoring  09/27/2022    Creatinine monitoring  09/27/2022    Pneumococcal 65+ years Vaccine  Completed    Hepatitis A vaccine  Aged Out    Hib vaccine  Aged Out    Meningococcal (ACWY) vaccine  Aged Out       Subjective:      Review of Systems   Constitutional: Negative for chills and fever. HENT: Negative for congestion and rhinorrhea. Respiratory: Negative for cough and wheezing. Cardiovascular: Negative for chest pain and leg swelling. Gastrointestinal: Negative for diarrhea, nausea and vomiting. Neurological: Negative for dizziness and weakness. Objective:     Vitals:    01/18/22 1121   BP: (!) 145/53   Pulse: 88   Resp: 18   Temp: 98.7 °F (37.1 °C)   SpO2: 93%   Weight: 76 lb 1.3 oz (34.5 kg)     Physical Exam  Vitals and nursing note reviewed. Constitutional:       General: She is not in acute distress. Appearance: She is well-developed. Cardiovascular:      Rate and Rhythm: Normal rate and regular rhythm. Pulmonary:      Effort: Pulmonary effort is normal.      Breath sounds: Normal breath sounds. Abdominal:      Palpations: Abdomen is soft. Tenderness: There is no abdominal tenderness. Musculoskeletal:      Right lower leg: No edema. Left lower leg: No edema. Comments: Trace swelling around lateral malleolus of R ankle, otherwise no edema is noted bilaterally. Patient notes ankle edema is consistent with the edema she has noted from a previous injury   Lymphadenopathy:      Cervical: No cervical adenopathy. Skin:     General: Skin is warm and dry. Neurological:      Mental Status: She is alert. Psychiatric:         Behavior: Behavior normal.         Assessment/Plan:        1. Right ankle pain, unspecified chronicity, improving  - Patient states symptoms have resolved, declines further evaluation. Will monitor for any recurrence.          Return if symptoms worsen or fail to improve. No orders of the defined types were placed in this encounter. No orders of the defined types were placed in this encounter.               Electronically signed by OPHELIA Marcum CNP on 1/18/2022 at 11:36 AM

## 2022-01-20 PROBLEM — N18.9 CKD (CHRONIC KIDNEY DISEASE): Status: ACTIVE | Noted: 2022-01-01

## 2022-01-25 NOTE — TELEPHONE ENCOUNTER
CT scheduled- 02/03/22 at 3 pm; arrive at 2;45 pm. No prep. GP notified via fax; along with Azithromycin order.

## 2022-01-25 NOTE — TELEPHONE ENCOUNTER
Received CXR result - noted 3 cm irregular opacity R midlung and additional mild airspace opacity R lung base. Was thought to most likely be airspace infiltrate.  Radiology advised follow up to confirm resolution and exclude underlying nodule vs correlation with chest CT Your A1C is improved to 7.4%, good. Overall your blood sugars are a little higher than Dr Barbosa would like.     Dr Barbosa recommends starting Jardiance. Jardiance blocks the kidneys from re absorbing sugar from the urine.  This can also promote weight loss. The higher your blood sugars are, the more this will work. This medication also decreases the coronary heart risk.  Side effects include increased urination and because of the sugar in the urine you may be more prone to urinary tract infections and yeast infections.  If you develop adverse urinary symptoms please let us know.     The dexcom gives you much more detailed information.  The carmel is very simple to use. The carmel is a good option for you.  We will contact [facility] to see if they need additional documentation from us.     Dr Barbosa is decreasing your night time Novoloin N to 40 units. Start taking Jardiance 10mg once daily in the morning.

## 2022-02-01 NOTE — PROGRESS NOTES
[facility]      Miah Walter is a 80 y.o. female resident of [facility] who presents today for medical conditions/complaints as noted below. HPI:     HPI   Patient presents for evaluation and management of chronic medical conditions as noted below. Last week patient had chest x-ray that noticed a 3 cm irregular opacity right mid lung and additional mild airspace opacity on the right lung base. This was thought to most likely be airspace infiltrate, radiology advised to follow-up to confirm resolution and exclude underlying nodule. She was started on azithromycin upon receipt of chest x-ray results, slight improvement of symptoms with this. A low-dose chest CT has been ordered. She was also noted to have significant anemia last week, hemoglobin 7.4. While she does have a history of anemia, her baseline hemoglobin is generally around 10. She has also had significant weight loss over the past several months, she has lost 12 pounds since September and now weighs 76 pounds. Discussed these results with patient as well as her daughter who was present via phone. Discussed concern for possible GI bleed given how quickly her hemoglobin had dropped, and we will plan to repeat CBC today. We did discuss that given recent blood supply shortages, he may not be able to obtain a transfusion for her until hemoglobin dropped to 6.0. We discussed the possibility of an underlying process, including possible malignancy, given her long history of tobacco use, recent weight loss, fatigue, and anemia. Continues to smoke AMA. Patient and daughter are agreeable to proceed with CT and repeat CBC, will plan to monitor carefully for any development of symptoms and discuss further planning upon receipt of results.     Current Outpatient Medications   Medication Sig Dispense Refill    potassium chloride (KLOR-CON M) 10 MEQ extended release tablet Take 2 tablets by mouth daily 30 tablet 0    mirtazapine (REMERON) 15 MG tablet Take 1 tablet by mouth nightly 30 tablet 3    LORazepam (ATIVAN) 0.5 MG tablet TAKE 1 TABLET BY MOUTH EVERY 4 HOURS AS NEEDED FOR ANXIETY 90 tablet 0    tiotropium (SPIRIVA HANDIHALER) 18 MCG inhalation capsule Inhale 1 capsule into the lungs daily 90 capsule 1    hydroCHLOROthiazide (HYDRODIURIL) 25 MG tablet TAKE ONE TABLET BY MOUTH EVERY MORNING 90 tablet 3    Cholecalciferol (VITAMIN D3) 50 MCG (2000 UT) CAPS Take 2,000 Units by mouth daily      famotidine (PEPCID) 20 MG tablet Take 1 tablet by mouth daily 60 tablet 3    nitroGLYCERIN (NITROSTAT) 0.4 MG SL tablet Take one tablet under the tongue as needed for chest pain. May repeat every 5 minutes for up to 3 doses. If no improvement, go to ER 25 tablet 3    albuterol (PROVENTIL) (2.5 MG/3ML) 0.083% nebulizer solution Take 3 mLs by nebulization every 6 hours as needed for Wheezing 120 each 3    ferrous gluconate (FERGON) 324 (38 Fe) MG tablet Take 324 mg by mouth daily (with breakfast)       albuterol sulfate HFA (PROVENTIL HFA) 108 (90 Base) MCG/ACT inhaler Inhale 2 puffs into the lungs every 6 hours as needed for Wheezing or Shortness of Breath 1 Inhaler 3    Fiber POWD (Orange 538 gm-S): Mix 1 tbsp and take by mouth at bedtime      Multiple Vitamins-Minerals (I-DANIA) TABS Take 1 tablet by mouth 2 times daily      Propylene Glycol (SYSTANE BALANCE OP) Apply 1 drop to eye 3 times daily       acetaminophen (TYLENOL) 500 MG tablet Take 500 mg by mouth every 4 hours as needed for Pain       Probiotic Product (PROBIOTIC DAILY PO) Take 1 tablet by mouth daily TruBiotics      Multiple Vitamins-Minerals (MULTIVITAMIN & MINERAL PO) Take 1 tablet by mouth daily.  docusate sodium (COLACE) 100 MG capsule Take 100 mg by mouth 2 times daily       ascorbic acid (VITAMIN C) 500 MG tablet Take 1,000 mg by mouth daily.       Biotin 5000 MCG CAPS Take 2 tablets by mouth daily        No current facility-administered medications for this visit. Allergies   Allergen Reactions    Acyclovir And Related Diarrhea and Nausea Only    Albuterol     Amitriptyline      Dizziness      Asa [Aspirin]      Stomach upset      Betadine [Povidone Iodine]     Biaxin [Clarithromycin] Nausea Only    Buspar [Buspirone]     Cefuroxime Axetil      Diarrhea      Celestone [Betamethasone]     Codeine     Darvocet A500 [Propoxyphene N-Acetaminophen]     Dicyclomine Hcl     Doxycycline Nausea Only    Esomeprazole Magnesium     Fluconazole     Lidex [Fluocinolone]     Neomycin     Pcn [Penicillins] Hives    Protonix [Pantoprazole]     Quinolones Hives    Statins [Statins]      Leg cramps      Tape [Adhesive Tape]     Tramadol      \"makes her walk into walls\"    Valium     Vicodin [Hydrocodone-Acetaminophen]     Zithromax [Azithromycin]      Nausea      Hydrocodone Nausea Only       Health Maintenance   Topic Date Due    Shingles Vaccine (1 of 2) Never done    COVID-19 Vaccine (3 - Booster for Moderna series) 07/17/2021    Flu vaccine (1) 09/01/2021    DTaP/Tdap/Td vaccine (2 - Tdap) 01/04/2037 (Originally 3/23/2008)    Depression Monitoring  03/16/2022    Annual Wellness Visit (AWV)  03/17/2022    TSH testing  01/20/2023    Creatinine monitoring  01/20/2023    Potassium monitoring  01/27/2023    Pneumococcal 65+ years Vaccine  Completed    Hepatitis A vaccine  Aged Out    Hib vaccine  Aged Out    Meningococcal (ACWY) vaccine  Aged Out       Subjective:      Review of Systems   Constitutional: Positive for fatigue. Negative for chills and fever. HENT: Negative for congestion and rhinorrhea. Respiratory: Positive for cough. Negative for shortness of breath. Cardiovascular: Negative for chest pain and leg swelling. Gastrointestinal: Negative for diarrhea, nausea and vomiting. Neurological: Negative for dizziness and weakness.        Objective:     Vitals:    02/01/22 0913   BP: 116/70   Pulse: 98   Resp: 18   Temp: 97.7 °F (36.5 °C)   SpO2: 90%     Physical Exam  Constitutional:       General: She is not in acute distress. HENT:      Head: Normocephalic and atraumatic. Right Ear: External ear normal.      Left Ear: External ear normal.   Eyes:      Extraocular Movements: Extraocular movements intact. Conjunctiva/sclera: Conjunctivae normal.   Cardiovascular:      Rate and Rhythm: Normal rate and regular rhythm. Pulmonary:      Effort: Pulmonary effort is normal. No respiratory distress. Comments: Slight rhonchi bilaterally  Neurological:      General: No focal deficit present. Mental Status: She is alert. Mental status is at baseline. Psychiatric:         Mood and Affect: Mood normal.         Behavior: Behavior normal.         Assessment/Plan:        1. Cough  2. Weight loss  3. Iron deficiency anemia secondary to inadequate dietary iron intake  4. Pulmonary nodule  5. Other emphysema ([facility] [address]  - Repeat CBC today. Low dose chest CT today. To ER if any worsening of symptoms. Return if symptoms worsen or fail to improve. No orders of the defined types were placed in this encounter. No orders of the defined types were placed in this encounter.               Electronically signed by OPHELIA Garsia CNP on 2/1/2022 at 3:02 PM

## 2022-02-01 NOTE — TELEPHONE ENCOUNTER
Nurse is to call back as soon as she checks her once more. Hasn't been any worse, but was in another room so couldn't give me her current sats.

## 2022-02-01 NOTE — TELEPHONE ENCOUNTER
Patient arrived for CT. Sitting in her wheelchair. Respirations easy and steady. Sats between 95-97%. Daughter at side.

## 2022-02-01 NOTE — TELEPHONE ENCOUNTER
Please verify with [facility] if patient's oxygen saturation improved, was 90% this morning.  If any worsening of symptoms, advise to ER

## 2022-02-01 NOTE — TELEPHONE ENCOUNTER
She was 91% this morning. In route for CT now here at [facility]. Will check her once she arrives.

## 2022-02-08 NOTE — PROGRESS NOTES
[facility]      Margo Lagos is a 80 y.o. female resident of [facility] who presents today for medical conditions/complaints as noted below. HPI:     HPI     Patient presents for evaluation and management of lung mass on CT. Met with patient and her family at facility to review results of lung CT screening, which noted numerous irregular solid masses in lungs, overall appearance compatible with multifocal neoplasia. Also noted multiple lesions in the liver, suspicious for hepatic metastasis. Tissue sampling was recommended for definitive diagnosis. Discussed oncology referral, and patient is agreeable. Appointment scheduled for 02/11/22. Patient has also noted her ears have been feeling plugged. Current Outpatient Medications   Medication Sig Dispense Refill    LORazepam (ATIVAN) 0.5 MG tablet TAKE 1 TABLET BY MOUTH EVERY 4 HOURS AS NEEDED FOR ANXIETY 90 tablet 0    potassium chloride (KLOR-CON M) 20 MEQ TBCR extended release tablet Take 20 mEq by mouth daily      mirtazapine (REMERON) 15 MG tablet Take 1 tablet by mouth nightly 30 tablet 3    tiotropium (SPIRIVA HANDIHALER) 18 MCG inhalation capsule Inhale 1 capsule into the lungs daily 90 capsule 1    hydroCHLOROthiazide (HYDRODIURIL) 25 MG tablet TAKE ONE TABLET BY MOUTH EVERY MORNING 90 tablet 3    Cholecalciferol (VITAMIN D3) 50 MCG (2000 UT) CAPS Take 2,000 Units by mouth daily      famotidine (PEPCID) 20 MG tablet Take 1 tablet by mouth daily 60 tablet 3    nitroGLYCERIN (NITROSTAT) 0.4 MG SL tablet Take one tablet under the tongue as needed for chest pain. May repeat every 5 minutes for up to 3 doses.  If no improvement, go to ER 25 tablet 3    albuterol (PROVENTIL) (2.5 MG/3ML) 0.083% nebulizer solution Take 3 mLs by nebulization every 6 hours as needed for Wheezing 120 each 3    ferrous gluconate (FERGON) 324 (38 Fe) MG tablet Take 324 mg by mouth daily (with breakfast)       albuterol sulfate HFA (PROVENTIL HFA) 108 (90 Base) MCG/ACT inhaler Inhale 2 puffs into the lungs every 6 hours as needed for Wheezing or Shortness of Breath 1 Inhaler 3    Fiber POWD (Orange 538 gm-S): Mix 1 TBSP and take by mouth at bedtime      Multiple Vitamins-Minerals (I-DANIA) TABS Take 1 tablet by mouth 2 times daily      Propylene Glycol (SYSTANE BALANCE OP) Apply 1 drop to eye 3 times daily       acetaminophen (TYLENOL) 500 MG tablet Take 500 mg by mouth every 4 hours as needed for Pain       Probiotic Product (PROBIOTIC DAILY PO) Take 1 tablet by mouth daily TruBiotics      Multiple Vitamins-Minerals (MULTIVITAMIN & MINERAL PO) Take 1 tablet by mouth daily.  docusate sodium (COLACE) 100 MG capsule Take 100 mg by mouth 2 times daily       ascorbic acid (VITAMIN C) 500 MG tablet Take 1,000 mg by mouth daily.  Biotin 5000 MCG CAPS Take 2 tablets by mouth daily        No current facility-administered medications for this visit.      Allergies   Allergen Reactions    Acyclovir And Related Diarrhea and Nausea Only    Albuterol     Amitriptyline      Dizziness      Asa [Aspirin]      Stomach upset      Betadine [Povidone Iodine]     Biaxin [Clarithromycin] Nausea Only    Buspar [Buspirone]     Cefuroxime Axetil      Diarrhea      Celestone [Betamethasone]     Codeine     Darvocet A500 [Propoxyphene N-Acetaminophen]     Dicyclomine Hcl     Doxycycline Nausea Only    Esomeprazole Magnesium     Fluconazole     Lidex [Fluocinolone]     Neomycin     Pcn [Penicillins] Hives    Protonix [Pantoprazole]     Quinolones Hives    Statins [Statins]      Leg cramps      Tape [Adhesive Tape]     Tramadol      \"makes her walk into walls\"    Valium     Vicodin [Hydrocodone-Acetaminophen]     Zithromax [Azithromycin]      Nausea      Hydrocodone Nausea Only       Health Maintenance   Topic Date Due    Shingles Vaccine (1 of 2) Never done    COVID-19 Vaccine (3 - Booster for Moderna series) 07/17/2021    Flu vaccine (1) 09/01/2021    DTaP/Tdap/Td vaccine (2 - Tdap) 01/04/2037 (Originally 3/23/2008)    Depression Monitoring  03/16/2022    Annual Wellness Visit (AWV)  03/17/2022    TSH testing  01/20/2023    Creatinine monitoring  01/20/2023    Potassium monitoring  02/02/2023    Pneumococcal 65+ years Vaccine  Completed    Hepatitis A vaccine  Aged Out    Hib vaccine  Aged Out    Meningococcal (ACWY) vaccine  Aged Out       Subjective:      Review of Systems   Constitutional: Negative for chills and fever. HENT: Negative for congestion and rhinorrhea. Respiratory: Negative for cough, shortness of breath and wheezing. Cardiovascular: Negative for chest pain and leg swelling. Gastrointestinal: Negative for diarrhea, nausea and vomiting. Neurological: Negative for dizziness and weakness. Objective:     Vitals:    02/08/22 1520   BP: (!) 110/46   Pulse: 96   Resp: 24   Temp: 97.8 °F (36.6 °C)   SpO2: 93%     Physical Exam  Constitutional:       General: She is not in acute distress. HENT:      Head: Normocephalic and atraumatic. Right Ear: External ear normal.      Left Ear: External ear normal.      Ears:      Comments: Bilateral cerumen impaction  Eyes:      Extraocular Movements: Extraocular movements intact. Conjunctiva/sclera: Conjunctivae normal.   Cardiovascular:      Rate and Rhythm: Normal rate and regular rhythm. Pulmonary:      Effort: Pulmonary effort is normal. No respiratory distress. Breath sounds: Normal breath sounds. Neurological:      General: No focal deficit present. Mental Status: She is alert. Mental status is at baseline. Psychiatric:         Mood and Affect: Mood normal.         Behavior: Behavior normal.         Assessment/Plan:        1. Multiple pulmonary nodules determined by computed tomography of lung  2. Liver lesion  - Follow up with oncology    3.  Bilateral impacted cerumen  - Cerumen irrigation        Return in about 1 month (around 3/8/2022). No orders of the defined types were placed in this encounter. No orders of the defined types were placed in this encounter.               Electronically signed by OPHELIA Viveros CNP on 2/8/2022 at 4:47 PM

## 2022-02-28 NOTE — TELEPHONE ENCOUNTER
[facility] called saying that Keyla Montaño is being admitted to hospices  With dx. Of Lung cancer. They would like to know is  Doctor MAI Would sign off on the paper work. They would still like for Tessa Townsend to still follow patient.

## 2022-03-01 NOTE — TELEPHONE ENCOUNTER
I will still plan to follow. Generally at [facility], hospice orders medications and signs death certificate.

## 2022-04-12 PROBLEM — Z51.5 HOSPICE CARE: Status: ACTIVE | Noted: 2022-01-01

## 2022-04-12 NOTE — PROGRESS NOTES
[facility]      Manuela Traore is a 80 y.o. female resident of [facility] who presents today for medical conditions/complaints as noted below. HPI:     HPI   Patient presents for evaluation and management of chronic medical conditions as noted below. Patient currently on palliative care, lung nodules and liver lesions concerning for metastatic cancer. Previous evaluation by oncology, was noted due to her advanced age and frail condition she is not a candidate for any form of aggressive therapy. Oncology advised to proceed with symptom management, referral for palliative care and possible transition to hospice. Discussed her pain, as nurses note this is not always well controlled. She does have orders for tylenol, oxycodone, and morphine available, and discussed to make sure patient is aware these can be taken if needed. Diabetes has been stable. Given she is currently underweight, have been working on increasing her caloric intake. No longer following with cardiology for congestive heart failure and coronary artery disease status. No longer following with pulmonology for COPD. No recent labs for hypothyroidism or CKD due to palliative care status. Anxiety and depression have been stable. Current Outpatient Medications   Medication Sig Dispense Refill    dexamethasone (DECADRON) 4 MG tablet Take 4 mg by mouth daily Hospice- for SOB, pain      ipratropium-albuterol (DUONEB) 0.5-2.5 (3) MG/3ML SOLN nebulizer solution Inhale 1 vial into the lungs every 4 hours While awake      senna (SENNA-TIME) 8.6 MG tablet Take 1 tablet by mouth 2 times daily      bisacodyl (DULCOLAX) 10 MG suppository Place 10 mg rectally daily as needed for Constipation      hyoscyamine (LEVSIN/SL) 125 MCG sublingual tablet Place 125 mcg under the tongue every 6 hours as needed (Secretions)      LORazepam (ATIVAN) 0.5 MG tablet Take 0.5 mg by mouth every 4 hours as needed for Anxiety (SOB).  morphine sulfate 20 MG/ML concentrated oral solution 5 mg every hour as needed for Pain (SOB). PO or SL      ondansetron (ZOFRAN) 4 MG tablet Take 4 mg by mouth every 6 hours as needed for Nausea or Vomiting      oxyCODONE (ROXICODONE) 5 MG immediate release tablet Take 2.5 mg by mouth every 6 hours as needed for Pain.  senna (SENOKOT) 8.6 MG tablet Take 1 tablet by mouth every 6 hours as needed for Constipation      LORazepam (ATIVAN) 0.5 MG tablet Take 0.5 mg by mouth every 6 hours.  potassium chloride (KLOR-CON M) 20 MEQ TBCR extended release tablet Take 20 mEq by mouth daily      hydroCHLOROthiazide (HYDRODIURIL) 25 MG tablet TAKE ONE TABLET BY MOUTH EVERY MORNING 90 tablet 3    famotidine (PEPCID) 20 MG tablet Take 1 tablet by mouth daily 60 tablet 3    nitroGLYCERIN (NITROSTAT) 0.4 MG SL tablet Take one tablet under the tongue as needed for chest pain. May repeat every 5 minutes for up to 3 doses. If no improvement, go to ER 25 tablet 3    albuterol (PROVENTIL) (2.5 MG/3ML) 0.083% nebulizer solution Take 3 mLs by nebulization every 6 hours as needed for Wheezing 120 each 3    albuterol sulfate HFA (PROVENTIL HFA) 108 (90 Base) MCG/ACT inhaler Inhale 2 puffs into the lungs every 6 hours as needed for Wheezing or Shortness of Breath 1 Inhaler 3    Propylene Glycol (SYSTANE BALANCE OP) Apply 1 drop to eye 3 times daily       acetaminophen (TYLENOL) 500 MG tablet Take 500 mg by mouth every 4 hours as needed for Pain        No current facility-administered medications for this visit.      Allergies   Allergen Reactions    Acyclovir And Related Diarrhea and Nausea Only    Albuterol     Amitriptyline      Dizziness      Asa [Aspirin]      Stomach upset      Betadine [Povidone Iodine]     Biaxin [Clarithromycin] Nausea Only    Buspar [Buspirone]     Cefuroxime Axetil      Diarrhea      Celestone [Betamethasone]     Codeine     Darvocet A500 [Propoxyphene N-Acetaminophen]     Dicyclomine Hcl     Doxycycline Nausea Only    Esomeprazole Magnesium     Fluconazole     Lidex [Fluocinolone]     Neomycin     Pcn [Penicillins] Hives    Protonix [Pantoprazole]     Quinolones Hives    Statins [Statins]      Leg cramps      Tape [Adhesive Tape]     Tramadol      \"makes her walk into walls\"    Valium     Vicodin [Hydrocodone-Acetaminophen]     Zithromax [Azithromycin]      Nausea      Hydrocodone Nausea Only       Health Maintenance   Topic Date Due    Shingles Vaccine (1 of 2) Never done    COVID-19 Vaccine (3 - Moderna risk 4-dose series) 03/17/2021    Depression Monitoring  03/16/2022    Annual Wellness Visit (AWV)  03/17/2022    DTaP/Tdap/Td vaccine (2 - Tdap) 01/04/2037 (Originally 3/23/2008)    Flu vaccine (Season Ended) 09/01/2022    TSH testing  01/20/2023    Creatinine monitoring  01/20/2023    Potassium monitoring  02/02/2023    Pneumococcal 65+ yrs at Risk Vaccine  Completed    Hepatitis A vaccine  Aged Out    Hib vaccine  Aged Out    Meningococcal (ACWY) vaccine  Aged Out       Subjective:      Review of Systems   Constitutional: Positive for appetite change. Negative for chills and fever. HENT: Negative for congestion and sore throat. Respiratory: Negative for chest tightness and wheezing. Cardiovascular: Negative for chest pain and leg swelling. Gastrointestinal: Negative for diarrhea, nausea and vomiting. Genitourinary: Negative for difficulty urinating and dysuria. Musculoskeletal: Negative for gait problem and myalgias. Neurological: Positive for weakness. Negative for dizziness and headaches. Psychiatric/Behavioral: Negative for confusion and decreased concentration. Due to patient's clinical status, ROS of symptoms was completed by discussion with long term care facility staff, as well as with input from patient. Objective: There were no vitals filed for this visit.   Physical Exam  Constitutional:       General: She is not in acute distress. Appearance: She is cachectic. HENT:      Right Ear: External ear normal.      Left Ear: External ear normal.   Eyes:      Extraocular Movements: Extraocular movements intact. Conjunctiva/sclera: Conjunctivae normal.   Cardiovascular:      Rate and Rhythm: Normal rate and regular rhythm. Pulmonary:      Effort: Pulmonary effort is normal.      Breath sounds: Normal breath sounds. Abdominal:      Palpations: Abdomen is soft. Tenderness: There is no abdominal tenderness. Skin:     General: Skin is warm and dry. Neurological:      Mental Status: Mental status is at baseline. She is lethargic. Psychiatric:         Mood and Affect: Mood normal.         Behavior: Behavior normal.         Assessment/Plan:        1. Palliative care patient, stable  2. Multiple pulmonary nodules determined by computed tomography of lung, stable  3. Liver lesion, stable  - Ensure adequate pain control    4. Controlled type 2 diabetes mellitus without complication, without long-term current use of insulin ([address] stable  - Continue to monitor    5. Mixed hyperlipidemia, stable  - Continue to monitor    6. Coronary artery disease involving native coronary artery of native heart, unspecified whether angina present, stable  7. Diastolic heart failure, unspecified HF chronicity ([address] stable  - No longer following with cardiology. Continue to monitor    8. Other emphysema ([address] stable  - No longer following with pulmonology, continue to monitor    9. Other specified hypothyroidism, stable  - Continue to monitor    10. Chronic kidney disease, unspecified CKD stage, stable  - Continue to monitor    11. Anxiety, stable  12. Major depressive disorder with single episode, in partial remission ([address] stable  - Continue to monitor          Return in about 3 months (around 7/12/2022). No orders of the defined types were placed in this encounter.     No orders of the defined types were placed in this encounter.               Electronically signed by OPHELIA Carlisle CNP on 4/12/2022 at 4:12 PM